# Patient Record
Sex: MALE | Race: WHITE | Employment: FULL TIME | URBAN - METROPOLITAN AREA
[De-identification: names, ages, dates, MRNs, and addresses within clinical notes are randomized per-mention and may not be internally consistent; named-entity substitution may affect disease eponyms.]

---

## 2021-01-18 ENCOUNTER — HOSPITAL ENCOUNTER (EMERGENCY)
Age: 46
Discharge: HOME | End: 2021-01-18
Attending: EMERGENCY MEDICINE
Payer: COMMERCIAL

## 2021-01-18 VITALS
SYSTOLIC BLOOD PRESSURE: 170 MMHG | HEIGHT: 67 IN | HEART RATE: 62 BPM | RESPIRATION RATE: 18 BRPM | WEIGHT: 203 LBS | TEMPERATURE: 97 F | DIASTOLIC BLOOD PRESSURE: 11 MMHG | BODY MASS INDEX: 31.86 KG/M2 | OXYGEN SATURATION: 98 %

## 2021-01-18 DIAGNOSIS — M54.31 SCIATICA OF RIGHT SIDE: Primary | ICD-10-CM

## 2021-01-18 PROCEDURE — 96374 THER/PROPH/DIAG INJ IV PUSH: CPT

## 2021-01-18 PROCEDURE — 99284 EMERGENCY DEPT VISIT MOD MDM: CPT

## 2021-01-18 PROCEDURE — 6360000002 HC RX W HCPCS: Performed by: EMERGENCY MEDICINE

## 2021-01-18 RX ORDER — CYCLOBENZAPRINE HCL 10 MG
10 TABLET ORAL 3 TIMES DAILY PRN
Qty: 10 TABLET | Refills: 0 | Status: SHIPPED | OUTPATIENT
Start: 2021-01-18 | End: 2021-01-28

## 2021-01-18 RX ORDER — OXYCODONE HYDROCHLORIDE AND ACETAMINOPHEN 5; 325 MG/1; MG/1
1 TABLET ORAL EVERY 8 HOURS PRN
Qty: 9 TABLET | Refills: 0 | Status: SHIPPED | OUTPATIENT
Start: 2021-01-18 | End: 2021-01-21

## 2021-01-18 RX ORDER — METHYLPREDNISOLONE 4 MG/1
TABLET ORAL
Qty: 1 KIT | Refills: 0 | Status: SHIPPED | OUTPATIENT
Start: 2021-01-18 | End: 2021-01-24

## 2021-01-18 RX ORDER — KETOROLAC TROMETHAMINE 30 MG/ML
30 INJECTION, SOLUTION INTRAMUSCULAR; INTRAVENOUS ONCE
Status: COMPLETED | OUTPATIENT
Start: 2021-01-18 | End: 2021-01-18

## 2021-01-18 RX ADMIN — KETOROLAC TROMETHAMINE 30 MG: 30 INJECTION, SOLUTION INTRAMUSCULAR at 08:18

## 2021-01-18 ASSESSMENT — PAIN SCALES - GENERAL: PAINLEVEL_OUTOF10: 7

## 2021-01-18 ASSESSMENT — PAIN DESCRIPTION - LOCATION: LOCATION: BACK

## 2021-01-18 ASSESSMENT — PAIN DESCRIPTION - PAIN TYPE: TYPE: ACUTE PAIN

## 2021-01-18 NOTE — ED PROVIDER NOTES
HPI:  1/18/21, Time: 8:05 AM ETHAN Robles is a 50 y.o. male presenting to the ED for back pain beginning 4 days ago. He describes it as a sharp pain in his right lower back which radiates down his right leg. Symptoms are worse when laying flat for a period of time and certain movements. He has been taking Aleve with minimal relief of his symptoms. Symptoms are improved when he gets up and walks around. He states he has had similar episodes in the past that normally resolve much quicker. He denies any recent falls or trauma or heavy lifting. No bowel or bladder incontinence or retention, fevers, saddle anesthesia, or extremity numbness or weakness. No anticoagulation. He denies chest pain, shortness of breath, cough, abdominal pain, nausea, vomiting, and diarrhea. Review of Systems:   Pertinent positives and negatives are stated within HPI, all other systems reviewed and are negative.          --------------------------------------------- PAST HISTORY ---------------------------------------------  Past Medical History:  has a past medical history of Depression and Hypertension. Past Surgical History:  has a past surgical history that includes Tonsillectomy. Social History:  reports that he has never smoked. He has never used smokeless tobacco. He reports current alcohol use of about 3.0 standard drinks of alcohol per week. He reports that he does not use drugs. Family History: family history is not on file. The patients home medications have been reviewed. Allergies: Patient has no known allergies. -------------------------------------------------- RESULTS -------------------------------------------------  All laboratory and radiology results have been personally reviewed by myself   LABS:  No results found for this visit on 01/18/21.     RADIOLOGY:  Interpreted by Radiologist.  No orders to display       ------------------------- NURSING NOTES AND VITALS REVIEWED ---------------------------   The nursing notes within the ED encounter and vital signs as below have been reviewed. BP (!) 170/11   Pulse 62   Temp 97 °F (36.1 °C)   Resp 18   Ht 5' 7\" (1.702 m)   Wt 203 lb (92.1 kg)   SpO2 98%   BMI 31.79 kg/m²   Oxygen Saturation Interpretation: Normal      ---------------------------------------------------PHYSICAL EXAM--------------------------------------      Constitutional/General: Alert and oriented x3, appears uncomfortable, non toxic in NAD  Head: Normocephalic and atraumatic  Eyes: PERRL, EOMI  Mouth: Oropharynx clear, handling secretions, no trismus  Neck: Supple, full ROM,   Pulmonary: Lungs clear to auscultation bilaterally, no wheezes, rales, or rhonchi. Not in respiratory distress  Cardiovascular:  Regular rate and rhythm, no murmurs, gallops, or rubs. 2+ distal pulses  Abdomen: Soft, non tender, non distended,   Back: No midline cervical, thoracic, or lumbar tenderness, tenderness to right lumbar paraspinal muscles and right sciatic notch. 5/5 strength bilateral upper and lower extremities. Normal sensation in all extremities. Extremities: Moves all extremities x 4. Warm and well perfused  Skin: warm and dry without rash  Neurologic: GCS 15, no focal motor or sensory deficits   Psych: Normal Affect      ------------------------------ ED COURSE/MEDICAL DECISION MAKING----------------------  Medications   ketorolac (TORADOL) injection 30 mg (30 mg Intravenous Given 1/18/21 0818)       Medical Decision Making/ED COURSE:   Patient is a 40-year-old male presenting with right-sided sciatica. He has no signs or symptoms of spinal cord compression. He has had no history of recent trauma or heavy lifting. Offered lumbar x-ray though I have low suspicion for bony abnormality. Patient would like to defer imaging at this time.   I advised the patient to follow-up with his doctor if continued or worsening symptoms, because he may need further evaluation with outpatient MRI. He demonstrates understanding. Plan to treat symptomatically with NSAIDs, muscle relaxants, steroids, and pain medication. Patient remained hemodynamically stable throughout ED course. Counseling: The emergency provider has spoken with the patient and discussed todays results, in addition to providing specific details for the plan of care and counseling regarding the diagnosis and prognosis. Questions are answered at this time and they are agreeable with the plan.      --------------------------------- IMPRESSION AND DISPOSITION ---------------------------------    IMPRESSION  1. Sciatica of right side        DISPOSITION  Disposition: Discharge to home  Patient condition is stable      NOTE: This report was transcribed using voice recognition software.  Every effort was made to ensure accuracy; however, inadvertent computerized transcription errors may be present    IPiyush MD, am the primary provider of this record       Piyush Dhillon MD  01/18/21 [id number]

## 2022-06-05 ENCOUNTER — HOSPITAL ENCOUNTER (INPATIENT)
Age: 47
LOS: 15 days | Discharge: HOME HEALTH | DRG: 329 | End: 2022-06-20
Attending: EMERGENCY MEDICINE | Admitting: SURGERY
Payer: COMMERCIAL

## 2022-06-05 ENCOUNTER — APPOINTMENT (OUTPATIENT)
Dept: CT IMAGING | Age: 47
DRG: 329 | End: 2022-06-05
Payer: COMMERCIAL

## 2022-06-05 ENCOUNTER — APPOINTMENT (OUTPATIENT)
Dept: GENERAL RADIOLOGY | Age: 47
DRG: 329 | End: 2022-06-05
Payer: COMMERCIAL

## 2022-06-05 DIAGNOSIS — K42.9 UMBILICAL HERNIA WITHOUT OBSTRUCTION AND WITHOUT GANGRENE: ICD-10-CM

## 2022-06-05 DIAGNOSIS — K76.0 FATTY LIVER: ICD-10-CM

## 2022-06-05 DIAGNOSIS — N28.1 KIDNEY CYSTS: ICD-10-CM

## 2022-06-05 DIAGNOSIS — K57.32 DIVERTICULITIS OF COLON: Primary | ICD-10-CM

## 2022-06-05 DIAGNOSIS — K57.92 DIVERTICULITIS: ICD-10-CM

## 2022-06-05 LAB
ALBUMIN SERPL-MCNC: 3.7 G/DL (ref 3.5–5.2)
ALP BLD-CCNC: 125 U/L (ref 40–129)
ALT SERPL-CCNC: 49 U/L (ref 0–40)
ANION GAP SERPL CALCULATED.3IONS-SCNC: 15 MMOL/L (ref 7–16)
AST SERPL-CCNC: 35 U/L (ref 0–39)
BACTERIA: NORMAL /HPF
BASOPHILS ABSOLUTE: 0.07 E9/L (ref 0–0.2)
BASOPHILS RELATIVE PERCENT: 0.7 % (ref 0–2)
BILIRUB SERPL-MCNC: 0.7 MG/DL (ref 0–1.2)
BILIRUBIN URINE: NEGATIVE
BLOOD, URINE: NEGATIVE
BUN BLDV-MCNC: 16 MG/DL (ref 6–20)
CALCIUM SERPL-MCNC: 9.3 MG/DL (ref 8.6–10.2)
CHLORIDE BLD-SCNC: 98 MMOL/L (ref 98–107)
CLARITY: CLEAR
CO2: 21 MMOL/L (ref 22–29)
COLOR: YELLOW
CREAT SERPL-MCNC: 1.1 MG/DL (ref 0.7–1.2)
EKG ATRIAL RATE: 70 BPM
EKG P AXIS: 32 DEGREES
EKG P-R INTERVAL: 160 MS
EKG Q-T INTERVAL: 404 MS
EKG QRS DURATION: 86 MS
EKG QTC CALCULATION (BAZETT): 436 MS
EKG R AXIS: -2 DEGREES
EKG T AXIS: 29 DEGREES
EKG VENTRICULAR RATE: 70 BPM
EOSINOPHILS ABSOLUTE: 0.21 E9/L (ref 0.05–0.5)
EOSINOPHILS RELATIVE PERCENT: 2.2 % (ref 0–6)
GFR AFRICAN AMERICAN: >60
GFR NON-AFRICAN AMERICAN: >60 ML/MIN/1.73
GLUCOSE BLD-MCNC: 117 MG/DL (ref 74–99)
GLUCOSE URINE: NEGATIVE MG/DL
HCT VFR BLD CALC: 44.1 % (ref 37–54)
HEMOGLOBIN: 14.2 G/DL (ref 12.5–16.5)
IMMATURE GRANULOCYTES #: 0.1 E9/L
IMMATURE GRANULOCYTES %: 1 % (ref 0–5)
KETONES, URINE: NEGATIVE MG/DL
LACTIC ACID: 0.9 MMOL/L (ref 0.5–2.2)
LEUKOCYTE ESTERASE, URINE: NEGATIVE
LIPASE: 18 U/L (ref 13–60)
LYMPHOCYTES ABSOLUTE: 1.18 E9/L (ref 1.5–4)
LYMPHOCYTES RELATIVE PERCENT: 12.2 % (ref 20–42)
MCH RBC QN AUTO: 27.8 PG (ref 26–35)
MCHC RBC AUTO-ENTMCNC: 32.2 % (ref 32–34.5)
MCV RBC AUTO: 86.3 FL (ref 80–99.9)
MONOCYTES ABSOLUTE: 0.41 E9/L (ref 0.1–0.95)
MONOCYTES RELATIVE PERCENT: 4.3 % (ref 2–12)
NEUTROPHILS ABSOLUTE: 7.67 E9/L (ref 1.8–7.3)
NEUTROPHILS RELATIVE PERCENT: 79.6 % (ref 43–80)
NITRITE, URINE: NEGATIVE
PDW BLD-RTO: 14 FL (ref 11.5–15)
PH UA: 6.5 (ref 5–9)
PLATELET # BLD: 314 E9/L (ref 130–450)
PMV BLD AUTO: 9.8 FL (ref 7–12)
POTASSIUM REFLEX MAGNESIUM: 3.7 MMOL/L (ref 3.5–5)
PROTEIN UA: NEGATIVE MG/DL
RBC # BLD: 5.11 E12/L (ref 3.8–5.8)
RBC UA: NORMAL /HPF (ref 0–2)
SODIUM BLD-SCNC: 134 MMOL/L (ref 132–146)
SPECIFIC GRAVITY UA: <=1.005 (ref 1–1.03)
TOTAL PROTEIN: 7.2 G/DL (ref 6.4–8.3)
TROPONIN, HIGH SENSITIVITY: 12 NG/L (ref 0–11)
TROPONIN, HIGH SENSITIVITY: 15 NG/L (ref 0–11)
UROBILINOGEN, URINE: 1 E.U./DL
WBC # BLD: 9.6 E9/L (ref 4.5–11.5)
WBC UA: NORMAL /HPF (ref 0–5)

## 2022-06-05 PROCEDURE — 96375 TX/PRO/DX INJ NEW DRUG ADDON: CPT

## 2022-06-05 PROCEDURE — 74177 CT ABD & PELVIS W/CONTRAST: CPT

## 2022-06-05 PROCEDURE — 2500000003 HC RX 250 WO HCPCS: Performed by: NURSE PRACTITIONER

## 2022-06-05 PROCEDURE — 6360000002 HC RX W HCPCS: Performed by: NURSE PRACTITIONER

## 2022-06-05 PROCEDURE — 83690 ASSAY OF LIPASE: CPT

## 2022-06-05 PROCEDURE — 80053 COMPREHEN METABOLIC PANEL: CPT

## 2022-06-05 PROCEDURE — 71046 X-RAY EXAM CHEST 2 VIEWS: CPT

## 2022-06-05 PROCEDURE — 85025 COMPLETE CBC W/AUTO DIFF WBC: CPT

## 2022-06-05 PROCEDURE — 2580000003 HC RX 258: Performed by: SURGERY

## 2022-06-05 PROCEDURE — 6360000002 HC RX W HCPCS: Performed by: SURGERY

## 2022-06-05 PROCEDURE — 93005 ELECTROCARDIOGRAM TRACING: CPT | Performed by: NURSE PRACTITIONER

## 2022-06-05 PROCEDURE — 6360000004 HC RX CONTRAST MEDICATION: Performed by: RADIOLOGY

## 2022-06-05 PROCEDURE — 99285 EMERGENCY DEPT VISIT HI MDM: CPT

## 2022-06-05 PROCEDURE — 2500000003 HC RX 250 WO HCPCS: Performed by: SURGERY

## 2022-06-05 PROCEDURE — 1200000000 HC SEMI PRIVATE

## 2022-06-05 PROCEDURE — 83605 ASSAY OF LACTIC ACID: CPT

## 2022-06-05 PROCEDURE — 81001 URINALYSIS AUTO W/SCOPE: CPT

## 2022-06-05 PROCEDURE — 2580000003 HC RX 258: Performed by: NURSE PRACTITIONER

## 2022-06-05 PROCEDURE — 96365 THER/PROPH/DIAG IV INF INIT: CPT

## 2022-06-05 PROCEDURE — 36415 COLL VENOUS BLD VENIPUNCTURE: CPT

## 2022-06-05 PROCEDURE — 84484 ASSAY OF TROPONIN QUANT: CPT

## 2022-06-05 RX ORDER — ONDANSETRON 2 MG/ML
4 INJECTION INTRAMUSCULAR; INTRAVENOUS ONCE
Status: COMPLETED | OUTPATIENT
Start: 2022-06-05 | End: 2022-06-05

## 2022-06-05 RX ORDER — KETOROLAC TROMETHAMINE 30 MG/ML
30 INJECTION, SOLUTION INTRAMUSCULAR; INTRAVENOUS ONCE
Status: COMPLETED | OUTPATIENT
Start: 2022-06-05 | End: 2022-06-05

## 2022-06-05 RX ORDER — 0.9 % SODIUM CHLORIDE 0.9 %
1000 INTRAVENOUS SOLUTION INTRAVENOUS ONCE
Status: COMPLETED | OUTPATIENT
Start: 2022-06-05 | End: 2022-06-05

## 2022-06-05 RX ORDER — METRONIDAZOLE 500 MG/100ML
500 INJECTION, SOLUTION INTRAVENOUS ONCE
Status: COMPLETED | OUTPATIENT
Start: 2022-06-05 | End: 2022-06-05

## 2022-06-05 RX ORDER — CEFTRIAXONE 2 G/1
2000 INJECTION, POWDER, FOR SOLUTION INTRAMUSCULAR; INTRAVENOUS ONCE
Status: DISCONTINUED | OUTPATIENT
Start: 2022-06-05 | End: 2022-06-05

## 2022-06-05 RX ORDER — MORPHINE SULFATE 4 MG/ML
4 INJECTION, SOLUTION INTRAMUSCULAR; INTRAVENOUS ONCE
Status: COMPLETED | OUTPATIENT
Start: 2022-06-05 | End: 2022-06-05

## 2022-06-05 RX ORDER — SODIUM CHLORIDE 9 MG/ML
INJECTION, SOLUTION INTRAVENOUS CONTINUOUS
Status: DISCONTINUED | OUTPATIENT
Start: 2022-06-05 | End: 2022-06-07

## 2022-06-05 RX ORDER — METRONIDAZOLE 500 MG/100ML
500 INJECTION, SOLUTION INTRAVENOUS EVERY 8 HOURS
Status: DISCONTINUED | OUTPATIENT
Start: 2022-06-05 | End: 2022-06-07

## 2022-06-05 RX ADMIN — IOPAMIDOL 75 ML: 755 INJECTION, SOLUTION INTRAVENOUS at 14:18

## 2022-06-05 RX ADMIN — KETOROLAC TROMETHAMINE 30 MG: 30 INJECTION, SOLUTION INTRAMUSCULAR at 12:43

## 2022-06-05 RX ADMIN — HYDROMORPHONE HYDROCHLORIDE 0.25 MG: 1 INJECTION, SOLUTION INTRAMUSCULAR; INTRAVENOUS; SUBCUTANEOUS at 20:33

## 2022-06-05 RX ADMIN — METRONIDAZOLE 500 MG: 500 INJECTION, SOLUTION INTRAVENOUS at 15:17

## 2022-06-05 RX ADMIN — SODIUM CHLORIDE 1000 ML: 9 INJECTION, SOLUTION INTRAVENOUS at 12:37

## 2022-06-05 RX ADMIN — SODIUM CHLORIDE: 9 INJECTION, SOLUTION INTRAVENOUS at 16:18

## 2022-06-05 RX ADMIN — SODIUM CHLORIDE: 9 INJECTION, SOLUTION INTRAVENOUS at 23:36

## 2022-06-05 RX ADMIN — MORPHINE SULFATE 4 MG: 4 INJECTION, SOLUTION INTRAMUSCULAR; INTRAVENOUS at 13:54

## 2022-06-05 RX ADMIN — ONDANSETRON 4 MG: 2 INJECTION INTRAMUSCULAR; INTRAVENOUS at 12:43

## 2022-06-05 RX ADMIN — CEFTRIAXONE 2000 MG: 2 INJECTION, POWDER, FOR SOLUTION INTRAMUSCULAR; INTRAVENOUS at 17:33

## 2022-06-05 RX ADMIN — METRONIDAZOLE 500 MG: 500 INJECTION, SOLUTION INTRAVENOUS at 23:35

## 2022-06-05 ASSESSMENT — PAIN DESCRIPTION - LOCATION
LOCATION: ABDOMEN

## 2022-06-05 ASSESSMENT — PAIN DESCRIPTION - FREQUENCY
FREQUENCY: CONTINUOUS
FREQUENCY: CONTINUOUS

## 2022-06-05 ASSESSMENT — PAIN SCALES - GENERAL
PAINLEVEL_OUTOF10: 10
PAINLEVEL_OUTOF10: 6
PAINLEVEL_OUTOF10: 8
PAINLEVEL_OUTOF10: 8
PAINLEVEL_OUTOF10: 10

## 2022-06-05 ASSESSMENT — PAIN DESCRIPTION - ONSET
ONSET: ON-GOING
ONSET: ON-GOING

## 2022-06-05 ASSESSMENT — PAIN DESCRIPTION - PAIN TYPE: TYPE: ACUTE PAIN

## 2022-06-05 ASSESSMENT — PAIN DESCRIPTION - DESCRIPTORS
DESCRIPTORS: SHARP;DISCOMFORT
DESCRIPTORS: ACHING;CRUSHING;DISCOMFORT;GNAWING

## 2022-06-05 ASSESSMENT — PAIN DESCRIPTION - ORIENTATION: ORIENTATION: MID

## 2022-06-05 ASSESSMENT — PAIN - FUNCTIONAL ASSESSMENT
PAIN_FUNCTIONAL_ASSESSMENT: 0-10
PAIN_FUNCTIONAL_ASSESSMENT: ACTIVITIES ARE NOT PREVENTED

## 2022-06-05 NOTE — ED NOTES
[facility] faxed, floor called to notify, spoke with Krystyna Peck, placed in transport.       Isabel Yarbrough RN  06/05/22 [id number]

## 2022-06-05 NOTE — ED PROVIDER NOTES
ED Attending shared visit  CC: Mandi         [facility]  Department of Emergency Medicine   ED  Encounter Note  Admit Date/RoomTime: 2022 11:55 AM  ED Room: 0508/0508-A    NAME: Elizabeth Alcantara  : 1975  MRN: [id number]     Chief Complaint:  Abdominal Pain (constipation x 1 week, bloating.  )    History of Present Illness        Elizabeth Alcantara is a 48 y.o. old male who presents to the emergency department by private vehicle, for gradual onset sharp pain in the entire abdomen with radiation into lower chest which began 1 week(s) prior to arrival.  There has been no similar episodes in the past.   Since onset the symptoms have been recurrent and worsening. The pain is associated with chills, nausea and constipation. The pain is aggravated by nothing in particular and relieved by nothing. There has been NO back pain, chest pain, shortness of breath, fever, vomiting, anorexia, weight loss, dark/black stools, blood in stool, blood in emesis, cloudy urine, urinary frequency, dysuria, hematuria, urinary urgency, urinary incontinence, penile discharge or scrotal pain. Patient states that he has had constipation for the last week. He states he is also having bloating. He states that he had last full bowel movement was 1 week ago. It is small amounts of watery diarrhea. .  ROS   Pertinent positives and negatives are stated within HPI, all other systems reviewed and are negative. Past Medical History:  has a past medical history of Depression and Hypertension. Surgical History:  has a past surgical history that includes Tonsillectomy. Social History:  reports that he has never smoked. He has never used smokeless tobacco. He reports current alcohol use of about 3.0 standard drinks of alcohol per week. He reports that he does not use drugs. Family History: family history is not on file. Allergies: Patient has no known allergies.     Physical Exam   Oxygen Saturation Interpretation: Normal.        ED Triage Vitals [06/05/22 1153]   BP Temp Temp Source Pulse Resp SpO2 Height Weight   (!) 208/116 97.1 °F (36.2 °C) Tympanic -- 20 100 % 5' 7\" (1.702 m) 236 lb (107 kg)       Physical Exam  General Appearance/Constitutional:  Alert, development consistent with age. HEENT:  NC/NT. PERRLA. Airway patent. Neck:  Supple. No lymphadenopathy. Respiratory: Lungs Clear to auscultation and breath sounds equal.  CV:  Regular rate and rhythm. GI:  normal appearing,with no visible hernias. Disteneded         Bowel sounds: normal bowel sounds. Tenderness: There is moderate tenderness present - located diffusely in the entire abdomen., There is no rebound tenderness. , There is no guarding. , There is no distension. , There is no pulsatile mass. .           Liver: non-tender. Spleen:  non-tender. Back: CVA Tenderness: No CVA tenderness. Integument:  Normal turgor. Warm, dry, without visible rash, unless noted elsewhere. Neurological:  Orientation age-appropriate. Motor functions intact.     Lab / Imaging Results   (All laboratory and radiology results have been personally reviewed by myself)  Labs:  Results for orders placed or performed during the hospital encounter of 06/05/22   CBC with Auto Differential   Result Value Ref Range    WBC 9.6 4.5 - 11.5 E9/L    RBC 5.11 3.80 - 5.80 E12/L    Hemoglobin 14.2 12.5 - 16.5 g/dL    Hematocrit 44.1 37.0 - 54.0 %    MCV 86.3 80.0 - 99.9 fL    MCH 27.8 26.0 - 35.0 pg    MCHC 32.2 32.0 - 34.5 %    RDW 14.0 11.5 - 15.0 fL    Platelets 391 258 - 628 E9/L    MPV 9.8 7.0 - 12.0 fL    Neutrophils % 79.6 43.0 - 80.0 %    Immature Granulocytes % 1.0 0.0 - 5.0 %    Lymphocytes % 12.2 (L) 20.0 - 42.0 %    Monocytes % 4.3 2.0 - 12.0 %    Eosinophils % 2.2 0.0 - 6.0 %    Basophils % 0.7 0.0 - 2.0 %    Neutrophils Absolute 7.67 (H) 1.80 - 7.30 E9/L    Immature Granulocytes # 0.10 E9/L    Lymphocytes Absolute 1.18 (L) 1.50 - 4.00 E9/L Monocytes Absolute 0.41 0.10 - 0.95 E9/L    Eosinophils Absolute 0.21 0.05 - 0.50 E9/L    Basophils Absolute 0.07 0.00 - 0.20 E9/L   Comprehensive Metabolic Panel w/ Reflex to MG   Result Value Ref Range    Sodium 134 132 - 146 mmol/L    Potassium reflex Magnesium 3.7 3.5 - 5.0 mmol/L    Chloride 98 98 - 107 mmol/L    CO2 21 (L) 22 - 29 mmol/L    Anion Gap 15 7 - 16 mmol/L    Glucose 117 (H) 74 - 99 mg/dL    BUN 16 6 - 20 mg/dL    CREATININE 1.1 0.7 - 1.2 mg/dL    GFR Non-African American >60 >=60 mL/min/1.73    GFR African American >60     Calcium 9.3 8.6 - 10.2 mg/dL    Total Protein 7.2 6.4 - 8.3 g/dL    Albumin 3.7 3.5 - 5.2 g/dL    Total Bilirubin 0.7 0.0 - 1.2 mg/dL    Alkaline Phosphatase 125 40 - 129 U/L    ALT 49 (H) 0 - 40 U/L    AST 35 0 - 39 U/L   Lipase   Result Value Ref Range    Lipase 18 13 - 60 U/L   Lactic Acid   Result Value Ref Range    Lactic Acid 0.9 0.5 - 2.2 mmol/L   Urinalysis with Microscopic   Result Value Ref Range    Color, UA Yellow Straw/Yellow    Clarity, UA Clear Clear    Glucose, Ur Negative Negative mg/dL    Bilirubin Urine Negative Negative    Ketones, Urine Negative Negative mg/dL    Specific Gravity, UA <=1.005 1.005 - 1.030    Blood, Urine Negative Negative    pH, UA 6.5 5.0 - 9.0    Protein, UA Negative Negative mg/dL    Urobilinogen, Urine 1.0 <2.0 E.U./dL    Nitrite, Urine Negative Negative    Leukocyte Esterase, Urine Negative Negative    WBC, UA NONE 0 - 5 /HPF    RBC, UA NONE 0 - 2 /HPF    Bacteria, UA NONE SEEN None Seen /HPF   Troponin   Result Value Ref Range    Troponin, High Sensitivity 12 (H) 0 - 11 ng/L   Troponin   Result Value Ref Range    Troponin, High Sensitivity 15 (H) 0 - 11 ng/L   EKG 12 Lead   Result Value Ref Range    Ventricular Rate 70 BPM    Atrial Rate 70 BPM    P-R Interval 160 ms    QRS Duration 86 ms    Q-T Interval 404 ms    QTc Calculation (Bazett) 436 ms    P Axis 32 degrees    R Axis -2 degrees    T Axis 29 degrees     Imaging:   All Radiology results interpreted by Radiologist unless otherwise noted. CT ABDOMEN PELVIS W IV CONTRAST Additional Contrast? None   Final Result   There is abnormal wall thickening identified of the sigmoid colon with   surrounding stranding and fluid to suggest acute diverticulitis or focal   colitis. No evidence of perforation or abscess at this time. Fluid is seen   surrounding both the liver and spleen as well as throughout the small bowel   mesentery. .         XR CHEST (2 VW)   Final Result   No acute process. ED Course / Medical Decision Making     Medications   cefTRIAXone (ROCEPHIN) 2,000 mg in sterile water 20 mL IV syringe (has no administration in time range)   0.9 % sodium chloride infusion ( IntraVENous New Bag 6/5/22 1618)   cefTRIAXone (ROCEPHIN) 1,000 mg in sodium chloride flush 10 mL IV syringe (has no administration in time range)   metronidazole (FLAGYL) 500 mg in 0.9% NaCl 100 mL IVPB premix (has no administration in time range)   HYDROmorphone (DILAUDID) injection 0.25 mg (has no administration in time range)   0.9 % sodium chloride bolus (0 mLs IntraVENous Stopped 6/5/22 1337)   ketorolac (TORADOL) injection 30 mg (30 mg IntraVENous Given 6/5/22 1243)   ondansetron (ZOFRAN) injection 4 mg (4 mg IntraVENous Given 6/5/22 1243)   morphine sulfate (PF) injection 4 mg (4 mg IntraVENous Given 6/5/22 1354)   iopamidol (ISOVUE-370) 76 % injection 75 mL (75 mLs IntraVENous Given 6/5/22 1418)   metronidazole (FLAGYL) 500 mg in 0.9% NaCl 100 mL IVPB premix (0 mg IntraVENous Stopped 6/5/22 1617)       EKG: This EKG is signed and interpreted by ER attending. Rate: 70  Rhythm: Sinus  Interpretation: Normal sinus rhythm. No STEMI    Re-Evaluations:  6/5/22      Time:1500-reviewed all results with patient. Patient states he does not feel he can go home with this pain. He would like to be admitted. He states he does not have a surgeon would like to take the on-call surgeon.     Patients condition remains unchanged and remains stable. Consultations:  200- Spoke with the radiologist, Dr. Deonna Laughlin, to clarify fluid around the liver and spleen. She states this is all from the colon. There is no ascites or free blood. 1533-spoke with Dr. Susan Noguera, on-call general surgeon. He agrees and accepts admission to [facility] 5 full admit. He would like patient n.p.o. and normal saline fluids at 125 mL/hr. Procedures:   none    MDM:  Patient presents to the ED for generalized abdominal pain with nausea and constipation. Differential diagnoses included but not limited to diverticulitis versus ascites. Workup in the ED revealed EKG was normal sinus rhythm high-sensitivity Ulta troponin was 12 and 15. CBC was unremarkable the normal white count. CMP was unremarkable. Lipase was normal at 18. Lactic acid was normal.  Urinalysis was unremarkable. .  CT of the abdomen pelvis with contrast revealed abnormal wall thickening of the sigmoid colon suggestive of acute diverticulitis with no evidence of perforation or abscess. There was fluid seen in the surrounding liver and spleen which was discussed with radiologist that felt this was just inflammation from the diverticulitis. Chest x-ray was unremarkable. Spoke with on-call surgeon that agrees and accepts admission for IV antibiotics and pain management. Patient requires continued workup and management of their symptoms and will be admitted to the hospital for further evaluation and treatment. Plan of Care/Counseling:  MARBIN Robb CNP and EM Attending Physician  reviewed today's visit with the patient in addition to providing specific details for the plan of care and counseling regarding the diagnosis and prognosis. Questions are answered at this time and are agreeable with the plan. Assessment      1. Diverticulitis of colon    2. Fatty liver    3. Kidney cysts    4.  Umbilical hernia without obstruction and without gangrene      This patient's ED course included: a personal history and physicial examination, re-evaluation prior to disposition, multiple bedside re-evaluations and IV medications  This patient has remained hemodynamically stable during their ED course. Plan   Inpatient Admission to General Medical Floor  Patient condition is stable. New Medications     Current Discharge Medication List        Electronically signed by MARBIN Winchester CNP   DD: 6/5/22  **This report was transcribed using voice recognition software. Every effort was made to ensure accuracy; however, inadvertent computerized transcription errors may be present.   END OF PROVIDER NOTE       MARBIN Winchester CNP  06/05/22 5536

## 2022-06-05 NOTE — PLAN OF CARE
Problem: Pain  Goal: Verbalizes/displays adequate comfort level or baseline comfort level  Outcome: Progressing  Flowsheets (Taken 6/5/2022 1600)  Verbalizes/displays adequate comfort level or baseline comfort level:   Assess pain using appropriate pain scale   Administer analgesics based on type and severity of pain and evaluate response     Problem: Safety - Adult  Goal: Free from fall injury  Outcome: Progressing     Problem: Anxiety  Goal: Will report anxiety at manageable levels  Description: INTERVENTIONS:  1. Administer medication as ordered  2. Teach and rehearse alternative coping skills  3.  Provide emotional support with 1:1 interaction with staff  Outcome: Progressing

## 2022-06-06 LAB
ANION GAP SERPL CALCULATED.3IONS-SCNC: 13 MMOL/L (ref 7–16)
BUN BLDV-MCNC: 16 MG/DL (ref 6–20)
CALCIUM SERPL-MCNC: 8.6 MG/DL (ref 8.6–10.2)
CHLORIDE BLD-SCNC: 103 MMOL/L (ref 98–107)
CO2: 23 MMOL/L (ref 22–29)
CREAT SERPL-MCNC: 1.1 MG/DL (ref 0.7–1.2)
GFR AFRICAN AMERICAN: >60
GFR NON-AFRICAN AMERICAN: >60 ML/MIN/1.73
GLUCOSE BLD-MCNC: 117 MG/DL (ref 74–99)
HCT VFR BLD CALC: 41.8 % (ref 37–54)
HEMOGLOBIN: 13.3 G/DL (ref 12.5–16.5)
MCH RBC QN AUTO: 27.8 PG (ref 26–35)
MCHC RBC AUTO-ENTMCNC: 31.8 % (ref 32–34.5)
MCV RBC AUTO: 87.4 FL (ref 80–99.9)
PDW BLD-RTO: 14.3 FL (ref 11.5–15)
PLATELET # BLD: 341 E9/L (ref 130–450)
PMV BLD AUTO: 10.2 FL (ref 7–12)
POTASSIUM SERPL-SCNC: 5.2 MMOL/L (ref 3.5–5)
RBC # BLD: 4.78 E12/L (ref 3.8–5.8)
SODIUM BLD-SCNC: 139 MMOL/L (ref 132–146)
WBC # BLD: 16.8 E9/L (ref 4.5–11.5)

## 2022-06-06 PROCEDURE — 6360000002 HC RX W HCPCS: Performed by: SURGERY

## 2022-06-06 PROCEDURE — 2500000003 HC RX 250 WO HCPCS: Performed by: SURGERY

## 2022-06-06 PROCEDURE — 36415 COLL VENOUS BLD VENIPUNCTURE: CPT

## 2022-06-06 PROCEDURE — 2580000003 HC RX 258: Performed by: SURGERY

## 2022-06-06 PROCEDURE — 85027 COMPLETE CBC AUTOMATED: CPT

## 2022-06-06 PROCEDURE — 80048 BASIC METABOLIC PNL TOTAL CA: CPT

## 2022-06-06 PROCEDURE — 1200000000 HC SEMI PRIVATE

## 2022-06-06 RX ADMIN — METRONIDAZOLE 500 MG: 500 INJECTION, SOLUTION INTRAVENOUS at 15:47

## 2022-06-06 RX ADMIN — METRONIDAZOLE 500 MG: 500 INJECTION, SOLUTION INTRAVENOUS at 23:35

## 2022-06-06 RX ADMIN — SODIUM CHLORIDE: 9 INJECTION, SOLUTION INTRAVENOUS at 20:29

## 2022-06-06 RX ADMIN — SODIUM CHLORIDE, PRESERVATIVE FREE 1000 MG: 5 INJECTION INTRAVENOUS at 17:06

## 2022-06-06 RX ADMIN — HYDROMORPHONE HYDROCHLORIDE 0.25 MG: 1 INJECTION, SOLUTION INTRAMUSCULAR; INTRAVENOUS; SUBCUTANEOUS at 04:21

## 2022-06-06 RX ADMIN — HYDROMORPHONE HYDROCHLORIDE 0.25 MG: 1 INJECTION, SOLUTION INTRAMUSCULAR; INTRAVENOUS; SUBCUTANEOUS at 00:20

## 2022-06-06 RX ADMIN — HYDROMORPHONE HYDROCHLORIDE 0.25 MG: 1 INJECTION, SOLUTION INTRAMUSCULAR; INTRAVENOUS; SUBCUTANEOUS at 23:37

## 2022-06-06 RX ADMIN — SODIUM CHLORIDE: 9 INJECTION, SOLUTION INTRAVENOUS at 10:15

## 2022-06-06 RX ADMIN — HYDROMORPHONE HYDROCHLORIDE 0.25 MG: 1 INJECTION, SOLUTION INTRAMUSCULAR; INTRAVENOUS; SUBCUTANEOUS at 17:06

## 2022-06-06 RX ADMIN — HYDROMORPHONE HYDROCHLORIDE 0.25 MG: 1 INJECTION, SOLUTION INTRAMUSCULAR; INTRAVENOUS; SUBCUTANEOUS at 10:11

## 2022-06-06 RX ADMIN — METRONIDAZOLE 500 MG: 500 INJECTION, SOLUTION INTRAVENOUS at 06:55

## 2022-06-06 ASSESSMENT — PAIN DESCRIPTION - DESCRIPTORS: DESCRIPTORS: THROBBING;OTHER (COMMENT)

## 2022-06-06 ASSESSMENT — PAIN SCALES - GENERAL
PAINLEVEL_OUTOF10: 7
PAINLEVEL_OUTOF10: 4
PAINLEVEL_OUTOF10: 10
PAINLEVEL_OUTOF10: 2
PAINLEVEL_OUTOF10: 8
PAINLEVEL_OUTOF10: 8

## 2022-06-06 ASSESSMENT — ENCOUNTER SYMPTOMS
ALLERGIC/IMMUNOLOGIC NEGATIVE: 1
EYES NEGATIVE: 1
CHEST TIGHTNESS: 0
VOMITING: 0
SHORTNESS OF BREATH: 0
ABDOMINAL DISTENTION: 0
NAUSEA: 1
WHEEZING: 0
CONSTIPATION: 1
ABDOMINAL PAIN: 1

## 2022-06-06 ASSESSMENT — PAIN DESCRIPTION - LOCATION: LOCATION: ABDOMEN

## 2022-06-06 ASSESSMENT — PAIN DESCRIPTION - ORIENTATION
ORIENTATION: LOWER;LEFT;MID
ORIENTATION: RIGHT;LOWER

## 2022-06-06 NOTE — PLAN OF CARE
Problem: Pain  Goal: Verbalizes/displays adequate comfort level or baseline comfort level  6/5/2022 2130 by John Nelson  Outcome: Progressing  6/5/2022 1621 by Sofie Durham RN  Outcome: Progressing  Flowsheets (Taken 6/5/2022 1600)  Verbalizes/displays adequate comfort level or baseline comfort level:   Assess pain using appropriate pain scale   Administer analgesics based on type and severity of pain and evaluate response     Problem: Safety - Adult  Goal: Free from fall injury  6/5/2022 2130 by John Nelson  Outcome: Progressing  6/5/2022 1621 by Sofie Durham RN  Outcome: Progressing     Problem: Anxiety  Goal: Will report anxiety at manageable levels  Description: INTERVENTIONS:  1. Administer medication as ordered  2. Teach and rehearse alternative coping skills  3.  Provide emotional support with 1:1 interaction with staff  6/5/2022 2130 by John Nelson  Outcome: Progressing  6/5/2022 1621 by Sofie Durham RN  Outcome: Progressing

## 2022-06-06 NOTE — CARE COORDINATION
Social Work / Discharge Planning : Patient admitted with Diverticulitis. AWait GI plan. Currently on Rocephin Q 24. Patient verified plan at discharge is HOME. Patient is totally independent. No hx of HHC/SNF. Patient PCP is DR Maria Luz Nguyen. Patient uses [facility] in [place]. If patient need IV Rocephin at discharge, he would like [facility]. Await plan. SW to follow.  Electronically signed by GINO Stevens on 6/6/22 at 1:26 PM EDT

## 2022-06-06 NOTE — H&P
GENERAL SURGERY  HISTORY AND PHYSICAL  6/6/2022    Chief Complaint   Patient presents with    Abdominal Pain     constipation x 1 week, bloating. SANDY Gasca is a 48 y.o. male who presents for evaluation of abdominal pain. He presents with complaints of 1 week of worsening constipation and right sided abdominal discomfort. He has been able to tolerate PO intake, but has had decreased appetite. Denies past abdominal surgery or endoscopy. On arrival he is afebrile and hemodynamically stable. WBC count is 16.8 this morning. CT scan revealed stranding around sigmoid colon concerning for acute diverticulitis without abscess or perforation. Past Medical History:   Diagnosis Date    Depression     Hypertension        Past Surgical History:   Procedure Laterality Date    TONSILLECTOMY         Prior to Admission medications    Medication Sig Start Date End Date Taking? Authorizing Provider   escitalopram (LEXAPRO) 20 MG tablet Take 20 mg by mouth daily    Historical Provider, MD       No Known Allergies    No family history on file. Social History     Tobacco Use    Smoking status: Never Smoker    Smokeless tobacco: Never Used   Substance Use Topics    Alcohol use: Yes     Alcohol/week: 3.0 standard drinks     Types: 3 Glasses of wine per week    Drug use: No         Review of Systems   Review of Systems   Constitutional: Positive for appetite change. Negative for activity change, fatigue and unexpected weight change. HENT: Negative. Eyes: Negative. Respiratory: Negative for chest tightness, shortness of breath and wheezing. Cardiovascular: Negative for chest pain, palpitations and leg swelling. Gastrointestinal: Positive for abdominal pain, constipation and nausea. Negative for abdominal distention and vomiting. Endocrine: Negative. Genitourinary: Negative for dysuria, hematuria and urgency. Musculoskeletal: Negative. Skin: Negative. Allergic/Immunologic: Negative. Neurological: Negative. Hematological: Negative. Psychiatric/Behavioral: Negative. All other systems reviewed and are negative. PHYSICAL EXAM:    Vitals:    06/05/22 2028   BP: 124/72   Pulse: 82   Resp: 16   Temp: 98.6 °F (37 °C)   SpO2: 96%       General Appearance:  awake, alert, oriented, in no acute distress  Skin:  Skin color, texture, turgor normal. No rashes or lesions. Head/face:  NCAT  Eyes:  No gross abnormalities. Sclera nonicteric  Lungs/Chest:  Normal expansion. No respiratory distress. Heart: Warm throughout. Regular rate   Abdomen:  Soft, mildly tender RLQ, non distended. No palpable organomegaly or masses. Extremities: Extremities warm to touch, pink, with no edema. LABS:  CBC  Recent Labs     06/06/22 0416   WBC 16.8*   HGB 13.3   HCT 41.8        BMP  Recent Labs     06/06/22 0416      K 5.2*      CO2 23   BUN 16   CREATININE 1.1   CALCIUM 8.6     Liver Function  Recent Labs     06/05/22  1234   LIPASE 18   BILITOT 0.7   AST 35   ALT 49*   ALKPHOS 125   PROT 7.2   LABALBU 3.7     No results for input(s): LACTATE in the last 72 hours. No results for input(s): INR, PTT in the last 72 hours. Invalid input(s): PT    RADIOLOGY    XR CHEST (2 VW)    Result Date: 6/5/2022  EXAMINATION: TWO XRAY VIEWS OF THE CHEST 6/5/2022 1:20 pm COMPARISON: None. HISTORY: ORDERING SYSTEM PROVIDED HISTORY: abd pain TECHNOLOGIST PROVIDED HISTORY: Reason for exam:->abd pain FINDINGS: The lungs are without acute focal process. There is no effusion or pneumothorax. The cardiomediastinal silhouette is without acute process. The osseous structures are without acute process. No acute process.      CT ABDOMEN PELVIS W IV CONTRAST Additional Contrast? None    Result Date: 6/5/2022  EXAMINATION: CT OF THE ABDOMEN AND PELVIS WITH CONTRAST 6/5/2022 2:17 pm TECHNIQUE: CT of the abdomen and pelvis was performed with the administration of intravenous contrast. Multiplanar reformatted images are provided for review. Automated exposure control, iterative reconstruction, and/or weight based adjustment of the mA/kV was utilized to reduce the radiation dose to as low as reasonably achievable. COMPARISON: None. HISTORY: ORDERING SYSTEM PROVIDED HISTORY: abd pain and constipation TECHNOLOGIST PROVIDED HISTORY: Additional Contrast?->None Reason for exam:->abd pain and constipation Decision Support Exception - unselect if not a suspected or confirmed emergency medical condition->Emergency Medical Condition (MA) FINDINGS: Lower Chest: The lung bases are grossly clear. Organs: There is diffuse fatty infiltration of the liver. The spleen is upper limits of normal in size. Some fluid seen surrounding the liver and spleen. No underlying mass or lesion. Pancreas is homogeneous. No stones seen in the gallbladder. No intrahepatic or extrahepatic biliary ductal dilatation. The adrenal glands are both unremarkable. Small cyst seen in the right kidney. Symmetric enhancement of the renal parenchyma. No stones or distension seen in the kidneys. GI/Bowel: The stomach is unremarkable. The small bowel is within normal limits. Abnormal wall thickening identified of the sigmoid colon to suggest a colitis. There are multiple diverticulum identified. Findings may suggest an acute diverticulitis. There is abnormal pericolonic stranding and fluid within the pelvis and pericolic gutters. Pelvis: The bladder reveals mild wall thickening. Correlation to urinalysis is recommended to exclude possible cystitis. The prostate is unremarkable. Peritoneum/Retroperitoneum: No abdominal retroperitoneal lymphadenopathy. There is borderline enlarged lymph nodes seen throughout the left periaortic region to suggest reactive lymph nodes. There is no pelvic adenopathy. Free fluid identified in the abdomen minimal in appearance and pericolic gutters. No extraluminal air to suggest perforation.   No loculation to suggest abscess. Bones/Soft Tissues: The bony structures reveal degenerative changes seen within the spine and pelvis. No ventral abdominal wall mass. Small umbilical hernia containing fat only. There is abnormal wall thickening identified of the sigmoid colon with surrounding stranding and fluid to suggest acute diverticulitis or focal colitis. No evidence of perforation or abscess at this time. Fluid is seen surrounding both the liver and spleen as well as throughout the small bowel mesentery. .       I have personally reviewed all relevant labs and imaging.      ASSESSMENT:  48 y.o. male with acute diverticulitis     PLAN:  - ok for CLD  - IV antibiotics  - monitor abdominal exam  - ambulate  - pain and nausea control prn     Discussed with Dr. Sandie Bautista     Electronically signed by Corrinne Boring, DO on 6/6/22 at 6:29 AM EDT

## 2022-06-07 ENCOUNTER — APPOINTMENT (OUTPATIENT)
Dept: CT IMAGING | Age: 47
DRG: 329 | End: 2022-06-07
Payer: COMMERCIAL

## 2022-06-07 ENCOUNTER — APPOINTMENT (OUTPATIENT)
Dept: GENERAL RADIOLOGY | Age: 47
DRG: 329 | End: 2022-06-07
Payer: COMMERCIAL

## 2022-06-07 LAB
ANION GAP SERPL CALCULATED.3IONS-SCNC: 9 MMOL/L (ref 7–16)
BASOPHILS ABSOLUTE: 0.05 E9/L (ref 0–0.2)
BASOPHILS RELATIVE PERCENT: 0.3 % (ref 0–2)
BUN BLDV-MCNC: 20 MG/DL (ref 6–20)
CALCIUM SERPL-MCNC: 9 MG/DL (ref 8.6–10.2)
CHLORIDE BLD-SCNC: 106 MMOL/L (ref 98–107)
CO2: 25 MMOL/L (ref 22–29)
CREAT SERPL-MCNC: 1 MG/DL (ref 0.7–1.2)
EOSINOPHILS ABSOLUTE: 0.07 E9/L (ref 0.05–0.5)
EOSINOPHILS RELATIVE PERCENT: 0.4 % (ref 0–6)
GFR AFRICAN AMERICAN: >60
GFR NON-AFRICAN AMERICAN: >60 ML/MIN/1.73
GLUCOSE BLD-MCNC: 149 MG/DL (ref 74–99)
HCT VFR BLD CALC: 44.8 % (ref 37–54)
HEMOGLOBIN: 14.5 G/DL (ref 12.5–16.5)
IMMATURE GRANULOCYTES #: 0.26 E9/L
IMMATURE GRANULOCYTES %: 1.5 % (ref 0–5)
LYMPHOCYTES ABSOLUTE: 0.73 E9/L (ref 1.5–4)
LYMPHOCYTES RELATIVE PERCENT: 4.1 % (ref 20–42)
MCH RBC QN AUTO: 28 PG (ref 26–35)
MCHC RBC AUTO-ENTMCNC: 32.4 % (ref 32–34.5)
MCV RBC AUTO: 86.7 FL (ref 80–99.9)
MONOCYTES ABSOLUTE: 0.76 E9/L (ref 0.1–0.95)
MONOCYTES RELATIVE PERCENT: 4.3 % (ref 2–12)
NEUTROPHILS ABSOLUTE: 15.9 E9/L (ref 1.8–7.3)
NEUTROPHILS RELATIVE PERCENT: 89.4 % (ref 43–80)
PDW BLD-RTO: 14.6 FL (ref 11.5–15)
PLATELET # BLD: 360 E9/L (ref 130–450)
PMV BLD AUTO: 10.1 FL (ref 7–12)
POTASSIUM SERPL-SCNC: 4.1 MMOL/L (ref 3.5–5)
RBC # BLD: 5.17 E12/L (ref 3.8–5.8)
SODIUM BLD-SCNC: 140 MMOL/L (ref 132–146)
WBC # BLD: 17.8 E9/L (ref 4.5–11.5)

## 2022-06-07 PROCEDURE — 2500000003 HC RX 250 WO HCPCS: Performed by: SURGERY

## 2022-06-07 PROCEDURE — 80048 BASIC METABOLIC PNL TOTAL CA: CPT

## 2022-06-07 PROCEDURE — 6360000004 HC RX CONTRAST MEDICATION: Performed by: RADIOLOGY

## 2022-06-07 PROCEDURE — 6360000002 HC RX W HCPCS: Performed by: SURGERY

## 2022-06-07 PROCEDURE — 6370000000 HC RX 637 (ALT 250 FOR IP): Performed by: STUDENT IN AN ORGANIZED HEALTH CARE EDUCATION/TRAINING PROGRAM

## 2022-06-07 PROCEDURE — 2580000003 HC RX 258: Performed by: STUDENT IN AN ORGANIZED HEALTH CARE EDUCATION/TRAINING PROGRAM

## 2022-06-07 PROCEDURE — 1200000000 HC SEMI PRIVATE

## 2022-06-07 PROCEDURE — 85025 COMPLETE CBC W/AUTO DIFF WBC: CPT

## 2022-06-07 PROCEDURE — 36415 COLL VENOUS BLD VENIPUNCTURE: CPT

## 2022-06-07 PROCEDURE — 71045 X-RAY EXAM CHEST 1 VIEW: CPT

## 2022-06-07 PROCEDURE — 74177 CT ABD & PELVIS W/CONTRAST: CPT

## 2022-06-07 PROCEDURE — 6360000002 HC RX W HCPCS: Performed by: STUDENT IN AN ORGANIZED HEALTH CARE EDUCATION/TRAINING PROGRAM

## 2022-06-07 RX ORDER — ACETAMINOPHEN 500 MG
1000 TABLET ORAL EVERY 8 HOURS
Status: DISCONTINUED | OUTPATIENT
Start: 2022-06-07 | End: 2022-06-20 | Stop reason: HOSPADM

## 2022-06-07 RX ORDER — METHOCARBAMOL 500 MG/1
500 TABLET, FILM COATED ORAL 4 TIMES DAILY
Status: DISCONTINUED | OUTPATIENT
Start: 2022-06-07 | End: 2022-06-07

## 2022-06-07 RX ORDER — ENOXAPARIN SODIUM 100 MG/ML
30 INJECTION SUBCUTANEOUS 2 TIMES DAILY
Status: DISCONTINUED | OUTPATIENT
Start: 2022-06-07 | End: 2022-06-20 | Stop reason: HOSPADM

## 2022-06-07 RX ORDER — OXYCODONE HYDROCHLORIDE 5 MG/1
5 TABLET ORAL EVERY 4 HOURS PRN
Status: DISCONTINUED | OUTPATIENT
Start: 2022-06-07 | End: 2022-06-08

## 2022-06-07 RX ORDER — SODIUM CHLORIDE, SODIUM LACTATE, POTASSIUM CHLORIDE, CALCIUM CHLORIDE 600; 310; 30; 20 MG/100ML; MG/100ML; MG/100ML; MG/100ML
INJECTION, SOLUTION INTRAVENOUS CONTINUOUS
Status: DISCONTINUED | OUTPATIENT
Start: 2022-06-07 | End: 2022-06-13

## 2022-06-07 RX ORDER — ONDANSETRON 2 MG/ML
4 INJECTION INTRAMUSCULAR; INTRAVENOUS EVERY 6 HOURS PRN
Status: DISCONTINUED | OUTPATIENT
Start: 2022-06-07 | End: 2022-06-20 | Stop reason: HOSPADM

## 2022-06-07 RX ADMIN — HYDROMORPHONE HYDROCHLORIDE 0.25 MG: 1 INJECTION, SOLUTION INTRAMUSCULAR; INTRAVENOUS; SUBCUTANEOUS at 23:26

## 2022-06-07 RX ADMIN — METRONIDAZOLE 500 MG: 500 INJECTION, SOLUTION INTRAVENOUS at 07:29

## 2022-06-07 RX ADMIN — HYDROMORPHONE HYDROCHLORIDE 0.25 MG: 1 INJECTION, SOLUTION INTRAMUSCULAR; INTRAVENOUS; SUBCUTANEOUS at 20:32

## 2022-06-07 RX ADMIN — PIPERACILLIN SODIUM AND TAZOBACTAM SODIUM 4500 MG: 4; .5 INJECTION, POWDER, LYOPHILIZED, FOR SOLUTION INTRAVENOUS at 21:00

## 2022-06-07 RX ADMIN — HYDROMORPHONE HYDROCHLORIDE 0.25 MG: 1 INJECTION, SOLUTION INTRAMUSCULAR; INTRAVENOUS; SUBCUTANEOUS at 04:11

## 2022-06-07 RX ADMIN — METHOCARBAMOL TABLETS 500 MG: 500 TABLET, COATED ORAL at 09:58

## 2022-06-07 RX ADMIN — HYDROMORPHONE HYDROCHLORIDE 0.25 MG: 1 INJECTION, SOLUTION INTRAMUSCULAR; INTRAVENOUS; SUBCUTANEOUS at 07:26

## 2022-06-07 RX ADMIN — ONDANSETRON 4 MG: 2 INJECTION INTRAMUSCULAR; INTRAVENOUS at 16:34

## 2022-06-07 RX ADMIN — SODIUM CHLORIDE, POTASSIUM CHLORIDE, SODIUM LACTATE AND CALCIUM CHLORIDE: 600; 310; 30; 20 INJECTION, SOLUTION INTRAVENOUS at 23:28

## 2022-06-07 RX ADMIN — PIPERACILLIN SODIUM AND TAZOBACTAM SODIUM 4500 MG: 4; .5 INJECTION, POWDER, LYOPHILIZED, FOR SOLUTION INTRAVENOUS at 13:21

## 2022-06-07 RX ADMIN — HYDROMORPHONE HYDROCHLORIDE 0.25 MG: 1 INJECTION, SOLUTION INTRAMUSCULAR; INTRAVENOUS; SUBCUTANEOUS at 16:34

## 2022-06-07 RX ADMIN — HYDROMORPHONE HYDROCHLORIDE 0.25 MG: 1 INJECTION, SOLUTION INTRAMUSCULAR; INTRAVENOUS; SUBCUTANEOUS at 12:25

## 2022-06-07 RX ADMIN — IOPAMIDOL 75 ML: 755 INJECTION, SOLUTION INTRAVENOUS at 15:27

## 2022-06-07 RX ADMIN — IOHEXOL 50 ML: 240 INJECTION, SOLUTION INTRATHECAL; INTRAVASCULAR; INTRAVENOUS; ORAL at 15:27

## 2022-06-07 RX ADMIN — ACETAMINOPHEN 1000 MG: 500 TABLET ORAL at 08:52

## 2022-06-07 RX ADMIN — ENOXAPARIN SODIUM 30 MG: 100 INJECTION SUBCUTANEOUS at 19:44

## 2022-06-07 RX ADMIN — METHOCARBAMOL 500 MG: 100 INJECTION, SOLUTION INTRAMUSCULAR; INTRAVENOUS at 16:54

## 2022-06-07 RX ADMIN — SODIUM CHLORIDE, POTASSIUM CHLORIDE, SODIUM LACTATE AND CALCIUM CHLORIDE: 600; 310; 30; 20 INJECTION, SOLUTION INTRAVENOUS at 17:29

## 2022-06-07 ASSESSMENT — PAIN DESCRIPTION - ONSET
ONSET: ON-GOING

## 2022-06-07 ASSESSMENT — PAIN SCALES - GENERAL
PAINLEVEL_OUTOF10: 4
PAINLEVEL_OUTOF10: 7
PAINLEVEL_OUTOF10: 8
PAINLEVEL_OUTOF10: 9
PAINLEVEL_OUTOF10: 2
PAINLEVEL_OUTOF10: 2
PAINLEVEL_OUTOF10: 7
PAINLEVEL_OUTOF10: 8
PAINLEVEL_OUTOF10: 8
PAINLEVEL_OUTOF10: 4
PAINLEVEL_OUTOF10: 6
PAINLEVEL_OUTOF10: 7

## 2022-06-07 ASSESSMENT — PAIN DESCRIPTION - DESCRIPTORS
DESCRIPTORS: PRESSURE
DESCRIPTORS: DISCOMFORT;PRESSURE
DESCRIPTORS: PRESSURE;DISCOMFORT
DESCRIPTORS: DISCOMFORT;SHARP;NUMBNESS
DESCRIPTORS: PRESSURE;DISCOMFORT
DESCRIPTORS: DISCOMFORT;PRESSURE
DESCRIPTORS: ACHING;THROBBING

## 2022-06-07 ASSESSMENT — PAIN DESCRIPTION - FREQUENCY
FREQUENCY: CONTINUOUS

## 2022-06-07 ASSESSMENT — PAIN DESCRIPTION - LOCATION
LOCATION: ABDOMEN

## 2022-06-07 ASSESSMENT — PAIN DESCRIPTION - PAIN TYPE
TYPE: ACUTE PAIN

## 2022-06-07 ASSESSMENT — PAIN - FUNCTIONAL ASSESSMENT
PAIN_FUNCTIONAL_ASSESSMENT: ACTIVITIES ARE NOT PREVENTED

## 2022-06-07 ASSESSMENT — PAIN DESCRIPTION - ORIENTATION
ORIENTATION: MID

## 2022-06-07 NOTE — PLAN OF CARE
Problem: Pain  Goal: Verbalizes/displays adequate comfort level or baseline comfort level  6/6/2022 2324 by Deedee Jorge  Outcome: Progressing  6/6/2022 1110 by Catia Arriaga RN  Outcome: Progressing     Problem: Safety - Adult  Goal: Free from fall injury  6/6/2022 2324 by Deedee Jorge  Outcome: Progressing  6/6/2022 1110 by Catia Arriaga RN  Outcome: Progressing     Problem: Anxiety  Goal: Will report anxiety at manageable levels  Description: INTERVENTIONS:  1. Administer medication as ordered  2. Teach and rehearse alternative coping skills  3.  Provide emotional support with 1:1 interaction with staff  6/6/2022 2324 by Deedee Jorge  Outcome: Progressing  6/6/2022 1110 by Catia Arriaga RN  Outcome: Progressing

## 2022-06-07 NOTE — PROGRESS NOTES
GENERAL SURGERY  DAILY PROGRESS NOTE  6/7/2022    Subjective: Told us on rounds that his pain is improved but nursing staff with multiple pages to attending surgeon regarding severe pain and nausea overnight. Patient was made strict NPO but staff apparently still bringing him many liquids overnight even with his pain. Objective:  BP (!) 164/88   Pulse (!) 106   Temp 97.7 °F (36.5 °C) (Oral)   Resp 16   Ht 5' 7\" (1.702 m)   Wt 236 lb (107 kg)   SpO2 93%   BMI 36.96 kg/m²     General Appearance:  awake, alert, oriented, uncomfortable   Skin:  Skin color, texture, turgor normal  Head/face:  NCAT  Eyes:  No gross abnormalities. Sclera nonicteric  Lungs/Chest:  Normal expansion. No respiratory distress. Heart: Warm throughout. Tachycardic   Abdomen:  Soft, moderately tender lower abdomen,  distended. Extremities: Extremities warm to touch, pink, with no edema. I have personally reviewed all relevant labs and imaging. Assessment/Plan:  48 y.o. male with acute diverticulitis     - NPO  - IV fluids  - continue IV antibiotics   - pain and nausea control prn  - ambulate     Electronically signed by Andre Gray DO on 6/7/2022 at 6:05 AM     Pt seen and examined  More distended today after taking in too much cld  No flatus or bm  He looks like he feels worse    Tympanic and distended, not peritoneal    Npo  Will order CT w IV and PO as his exam has changed and wbc increasing  Iv abx    Angela Cramer MD  Minimally Invasive General Surgery and Endoscopy  [address] [place] [address].  [address]: [phone]  F: [phone]    Electronically signed by Nilay Maddox MD on 6/7/2022 at 12:38 PM

## 2022-06-07 NOTE — CARE COORDINATION
CASE MANAGEMENT. ... Chart reviewed. Sx following. Patient npo. Continues on iv rocephin and iv flagyl. Requiring iv dilaudid prn atc. Plan is home. If iv atbs needed qd at dc, patient requesting [address] [facility] [address]. Will follow.

## 2022-06-07 NOTE — PROGRESS NOTES
Patient tried to have a bowel movement and was unsuccessful. Started having increased abdominal pain after that 10/10. Abdomen is distended and rigid. Notified Dr. Angelika Simon of increased pain, changed Dilaudid order to every three hours PRN instead of four. Also ordered patient to be NPO, diet order changed.

## 2022-06-08 ENCOUNTER — ANESTHESIA (OUTPATIENT)
Dept: OPERATING ROOM | Age: 47
DRG: 329 | End: 2022-06-08
Payer: COMMERCIAL

## 2022-06-08 ENCOUNTER — APPOINTMENT (OUTPATIENT)
Dept: GENERAL RADIOLOGY | Age: 47
DRG: 329 | End: 2022-06-08
Payer: COMMERCIAL

## 2022-06-08 ENCOUNTER — ANESTHESIA EVENT (OUTPATIENT)
Dept: OPERATING ROOM | Age: 47
DRG: 329 | End: 2022-06-08
Payer: COMMERCIAL

## 2022-06-08 LAB
ANION GAP SERPL CALCULATED.3IONS-SCNC: 12 MMOL/L (ref 7–16)
BASOPHILS ABSOLUTE: 0.06 E9/L (ref 0–0.2)
BASOPHILS RELATIVE PERCENT: 0.4 % (ref 0–2)
BUN BLDV-MCNC: 25 MG/DL (ref 6–20)
C-REACTIVE PROTEIN: 29.6 MG/DL (ref 0–0.4)
CALCIUM SERPL-MCNC: 8.8 MG/DL (ref 8.6–10.2)
CEA: 0.5 NG/ML (ref 0–5.2)
CHLORIDE BLD-SCNC: 105 MMOL/L (ref 98–107)
CO2: 26 MMOL/L (ref 22–29)
CREAT SERPL-MCNC: 1.1 MG/DL (ref 0.7–1.2)
EOSINOPHILS ABSOLUTE: 0.19 E9/L (ref 0.05–0.5)
EOSINOPHILS RELATIVE PERCENT: 1.1 % (ref 0–6)
GFR AFRICAN AMERICAN: >60
GFR NON-AFRICAN AMERICAN: >60 ML/MIN/1.73
GLUCOSE BLD-MCNC: 147 MG/DL (ref 74–99)
HCT VFR BLD CALC: 40.1 % (ref 37–54)
HEMOGLOBIN: 12.8 G/DL (ref 12.5–16.5)
IMMATURE GRANULOCYTES #: 0.26 E9/L
IMMATURE GRANULOCYTES %: 1.5 % (ref 0–5)
LYMPHOCYTES ABSOLUTE: 1.2 E9/L (ref 1.5–4)
LYMPHOCYTES RELATIVE PERCENT: 7.1 % (ref 20–42)
MAGNESIUM: 2.3 MG/DL (ref 1.6–2.6)
MCH RBC QN AUTO: 27.8 PG (ref 26–35)
MCHC RBC AUTO-ENTMCNC: 31.9 % (ref 32–34.5)
MCV RBC AUTO: 87.2 FL (ref 80–99.9)
MONOCYTES ABSOLUTE: 0.72 E9/L (ref 0.1–0.95)
MONOCYTES RELATIVE PERCENT: 4.3 % (ref 2–12)
NEUTROPHILS ABSOLUTE: 14.51 E9/L (ref 1.8–7.3)
NEUTROPHILS RELATIVE PERCENT: 85.6 % (ref 43–80)
PDW BLD-RTO: 14.8 FL (ref 11.5–15)
PHOSPHORUS: 2.9 MG/DL (ref 2.5–4.5)
PLATELET # BLD: 401 E9/L (ref 130–450)
PMV BLD AUTO: 10 FL (ref 7–12)
POTASSIUM SERPL-SCNC: 4.1 MMOL/L (ref 3.5–5)
RBC # BLD: 4.6 E12/L (ref 3.8–5.8)
SEDIMENTATION RATE, ERYTHROCYTE: 58 MM/HR (ref 0–15)
SODIUM BLD-SCNC: 143 MMOL/L (ref 132–146)
WBC # BLD: 16.9 E9/L (ref 4.5–11.5)

## 2022-06-08 PROCEDURE — 6360000002 HC RX W HCPCS: Performed by: SURGERY

## 2022-06-08 PROCEDURE — 36415 COLL VENOUS BLD VENIPUNCTURE: CPT

## 2022-06-08 PROCEDURE — 83735 ASSAY OF MAGNESIUM: CPT

## 2022-06-08 PROCEDURE — 6360000002 HC RX W HCPCS: Performed by: NURSE ANESTHETIST, CERTIFIED REGISTERED

## 2022-06-08 PROCEDURE — 86140 C-REACTIVE PROTEIN: CPT

## 2022-06-08 PROCEDURE — 3600007511: Performed by: SURGERY

## 2022-06-08 PROCEDURE — 82378 CARCINOEMBRYONIC ANTIGEN: CPT

## 2022-06-08 PROCEDURE — 80048 BASIC METABOLIC PNL TOTAL CA: CPT

## 2022-06-08 PROCEDURE — 2580000003 HC RX 258: Performed by: STUDENT IN AN ORGANIZED HEALTH CARE EDUCATION/TRAINING PROGRAM

## 2022-06-08 PROCEDURE — 7100000001 HC PACU RECOVERY - ADDTL 15 MIN: Performed by: SURGERY

## 2022-06-08 PROCEDURE — 7100000000 HC PACU RECOVERY - FIRST 15 MIN: Performed by: SURGERY

## 2022-06-08 PROCEDURE — 6360000002 HC RX W HCPCS: Performed by: STUDENT IN AN ORGANIZED HEALTH CARE EDUCATION/TRAINING PROGRAM

## 2022-06-08 PROCEDURE — 2500000003 HC RX 250 WO HCPCS: Performed by: NURSE ANESTHETIST, CERTIFIED REGISTERED

## 2022-06-08 PROCEDURE — 2580000003 HC RX 258: Performed by: NURSE ANESTHETIST, CERTIFIED REGISTERED

## 2022-06-08 PROCEDURE — 85025 COMPLETE CBC W/AUTO DIFF WBC: CPT

## 2022-06-08 PROCEDURE — 3700000000 HC ANESTHESIA ATTENDED CARE: Performed by: SURGERY

## 2022-06-08 PROCEDURE — 84100 ASSAY OF PHOSPHORUS: CPT

## 2022-06-08 PROCEDURE — 0DJD8ZZ INSPECTION OF LOWER INTESTINAL TRACT, VIA NATURAL OR ARTIFICIAL OPENING ENDOSCOPIC: ICD-10-PCS | Performed by: SURGERY

## 2022-06-08 PROCEDURE — 85651 RBC SED RATE NONAUTOMATED: CPT

## 2022-06-08 PROCEDURE — 1200000000 HC SEMI PRIVATE

## 2022-06-08 PROCEDURE — 74018 RADEX ABDOMEN 1 VIEW: CPT

## 2022-06-08 PROCEDURE — 2700000000 HC OXYGEN THERAPY PER DAY

## 2022-06-08 PROCEDURE — 2709999900 HC NON-CHARGEABLE SUPPLY: Performed by: SURGERY

## 2022-06-08 PROCEDURE — 3700000001 HC ADD 15 MINUTES (ANESTHESIA): Performed by: SURGERY

## 2022-06-08 PROCEDURE — 3600007501: Performed by: SURGERY

## 2022-06-08 RX ORDER — PROPOFOL 10 MG/ML
INJECTION, EMULSION INTRAVENOUS PRN
Status: DISCONTINUED | OUTPATIENT
Start: 2022-06-08 | End: 2022-06-08 | Stop reason: SDUPTHER

## 2022-06-08 RX ORDER — HYDRALAZINE HYDROCHLORIDE 20 MG/ML
5 INJECTION INTRAMUSCULAR; INTRAVENOUS
Status: DISCONTINUED | OUTPATIENT
Start: 2022-06-08 | End: 2022-06-13 | Stop reason: HOSPADM

## 2022-06-08 RX ORDER — ROCURONIUM BROMIDE 10 MG/ML
INJECTION, SOLUTION INTRAVENOUS PRN
Status: DISCONTINUED | OUTPATIENT
Start: 2022-06-08 | End: 2022-06-08 | Stop reason: SDUPTHER

## 2022-06-08 RX ORDER — LABETALOL HYDROCHLORIDE 5 MG/ML
5 INJECTION, SOLUTION INTRAVENOUS
Status: DISCONTINUED | OUTPATIENT
Start: 2022-06-08 | End: 2022-06-13 | Stop reason: HOSPADM

## 2022-06-08 RX ORDER — DEXAMETHASONE SODIUM PHOSPHATE 4 MG/ML
INJECTION, SOLUTION INTRA-ARTICULAR; INTRALESIONAL; INTRAMUSCULAR; INTRAVENOUS; SOFT TISSUE PRN
Status: DISCONTINUED | OUTPATIENT
Start: 2022-06-08 | End: 2022-06-08 | Stop reason: SDUPTHER

## 2022-06-08 RX ORDER — SODIUM CHLORIDE 0.9 % (FLUSH) 0.9 %
5-40 SYRINGE (ML) INJECTION PRN
Status: DISCONTINUED | OUTPATIENT
Start: 2022-06-08 | End: 2022-06-13 | Stop reason: HOSPADM

## 2022-06-08 RX ORDER — SODIUM CHLORIDE 9 MG/ML
INJECTION, SOLUTION INTRAVENOUS PRN
Status: DISCONTINUED | OUTPATIENT
Start: 2022-06-08 | End: 2022-06-13 | Stop reason: HOSPADM

## 2022-06-08 RX ORDER — KETOROLAC TROMETHAMINE 30 MG/ML
15 INJECTION, SOLUTION INTRAMUSCULAR; INTRAVENOUS EVERY 6 HOURS
Status: DISPENSED | OUTPATIENT
Start: 2022-06-08 | End: 2022-06-13

## 2022-06-08 RX ORDER — PROCHLORPERAZINE EDISYLATE 5 MG/ML
5 INJECTION INTRAMUSCULAR; INTRAVENOUS
Status: ACTIVE | OUTPATIENT
Start: 2022-06-08 | End: 2022-06-08

## 2022-06-08 RX ORDER — MEPERIDINE HYDROCHLORIDE 25 MG/ML
12.5 INJECTION INTRAMUSCULAR; INTRAVENOUS; SUBCUTANEOUS
Status: ACTIVE | OUTPATIENT
Start: 2022-06-08 | End: 2022-06-08

## 2022-06-08 RX ORDER — SODIUM CHLORIDE 0.9 % (FLUSH) 0.9 %
5-40 SYRINGE (ML) INJECTION EVERY 12 HOURS SCHEDULED
Status: DISCONTINUED | OUTPATIENT
Start: 2022-06-08 | End: 2022-06-13 | Stop reason: HOSPADM

## 2022-06-08 RX ORDER — DIPHENHYDRAMINE HYDROCHLORIDE 50 MG/ML
12.5 INJECTION INTRAMUSCULAR; INTRAVENOUS
Status: ACTIVE | OUTPATIENT
Start: 2022-06-08 | End: 2022-06-08

## 2022-06-08 RX ORDER — SODIUM CHLORIDE 9 MG/ML
INJECTION, SOLUTION INTRAVENOUS CONTINUOUS PRN
Status: DISCONTINUED | OUTPATIENT
Start: 2022-06-08 | End: 2022-06-08 | Stop reason: SDUPTHER

## 2022-06-08 RX ORDER — FENTANYL CITRATE 50 UG/ML
INJECTION, SOLUTION INTRAMUSCULAR; INTRAVENOUS PRN
Status: DISCONTINUED | OUTPATIENT
Start: 2022-06-08 | End: 2022-06-08 | Stop reason: SDUPTHER

## 2022-06-08 RX ORDER — ONDANSETRON 2 MG/ML
INJECTION INTRAMUSCULAR; INTRAVENOUS PRN
Status: DISCONTINUED | OUTPATIENT
Start: 2022-06-08 | End: 2022-06-08 | Stop reason: SDUPTHER

## 2022-06-08 RX ORDER — FENTANYL CITRATE 50 UG/ML
25 INJECTION, SOLUTION INTRAMUSCULAR; INTRAVENOUS EVERY 5 MIN PRN
Status: DISCONTINUED | OUTPATIENT
Start: 2022-06-08 | End: 2022-06-13 | Stop reason: HOSPADM

## 2022-06-08 RX ADMIN — KETOROLAC TROMETHAMINE 15 MG: 30 INJECTION, SOLUTION INTRAMUSCULAR; INTRAVENOUS at 19:26

## 2022-06-08 RX ADMIN — METHOCARBAMOL 500 MG: 100 INJECTION, SOLUTION INTRAMUSCULAR; INTRAVENOUS at 10:04

## 2022-06-08 RX ADMIN — SODIUM CHLORIDE, POTASSIUM CHLORIDE, SODIUM LACTATE AND CALCIUM CHLORIDE: 600; 310; 30; 20 INJECTION, SOLUTION INTRAVENOUS at 19:30

## 2022-06-08 RX ADMIN — ROCURONIUM BROMIDE 5 MG: 10 INJECTION, SOLUTION INTRAVENOUS at 11:52

## 2022-06-08 RX ADMIN — SODIUM CHLORIDE, POTASSIUM CHLORIDE, SODIUM LACTATE AND CALCIUM CHLORIDE: 600; 310; 30; 20 INJECTION, SOLUTION INTRAVENOUS at 04:51

## 2022-06-08 RX ADMIN — DEXAMETHASONE SODIUM PHOSPHATE 8 MG: 4 INJECTION, SOLUTION INTRAMUSCULAR; INTRAVENOUS at 12:33

## 2022-06-08 RX ADMIN — PROPOFOL 150 MG: 10 INJECTION, EMULSION INTRAVENOUS at 11:52

## 2022-06-08 RX ADMIN — ENOXAPARIN SODIUM 30 MG: 100 INJECTION SUBCUTANEOUS at 21:35

## 2022-06-08 RX ADMIN — ONDANSETRON 4 MG: 2 INJECTION INTRAMUSCULAR; INTRAVENOUS at 12:33

## 2022-06-08 RX ADMIN — FENTANYL CITRATE 100 MCG: 50 INJECTION, SOLUTION INTRAMUSCULAR; INTRAVENOUS at 11:52

## 2022-06-08 RX ADMIN — HYDROMORPHONE HYDROCHLORIDE 0.25 MG: 1 INJECTION, SOLUTION INTRAMUSCULAR; INTRAVENOUS; SUBCUTANEOUS at 04:47

## 2022-06-08 RX ADMIN — ROCURONIUM BROMIDE 15 MG: 10 INJECTION, SOLUTION INTRAVENOUS at 12:04

## 2022-06-08 RX ADMIN — METHOCARBAMOL 500 MG: 100 INJECTION, SOLUTION INTRAMUSCULAR; INTRAVENOUS at 01:21

## 2022-06-08 RX ADMIN — SUGAMMADEX 428 MG: 100 INJECTION, SOLUTION INTRAVENOUS at 12:36

## 2022-06-08 RX ADMIN — PIPERACILLIN SODIUM AND TAZOBACTAM SODIUM 4500 MG: 4; .5 INJECTION, POWDER, LYOPHILIZED, FOR SOLUTION INTRAVENOUS at 04:49

## 2022-06-08 RX ADMIN — METHOCARBAMOL 500 MG: 100 INJECTION, SOLUTION INTRAMUSCULAR; INTRAVENOUS at 17:49

## 2022-06-08 RX ADMIN — PIPERACILLIN SODIUM AND TAZOBACTAM SODIUM 4500 MG: 4; .5 INJECTION, POWDER, LYOPHILIZED, FOR SOLUTION INTRAVENOUS at 21:37

## 2022-06-08 RX ADMIN — SODIUM CHLORIDE: 9 INJECTION, SOLUTION INTRAVENOUS at 11:47

## 2022-06-08 RX ADMIN — KETOROLAC TROMETHAMINE 15 MG: 30 INJECTION, SOLUTION INTRAMUSCULAR; INTRAVENOUS at 06:55

## 2022-06-08 ASSESSMENT — PAIN SCALES - GENERAL
PAINLEVEL_OUTOF10: 7
PAINLEVEL_OUTOF10: 0
PAINLEVEL_OUTOF10: 7
PAINLEVEL_OUTOF10: 3

## 2022-06-08 ASSESSMENT — PAIN - FUNCTIONAL ASSESSMENT: PAIN_FUNCTIONAL_ASSESSMENT: ACTIVITIES ARE NOT PREVENTED

## 2022-06-08 ASSESSMENT — PAIN DESCRIPTION - PAIN TYPE: TYPE: ACUTE PAIN

## 2022-06-08 ASSESSMENT — PAIN DESCRIPTION - ORIENTATION: ORIENTATION: MID

## 2022-06-08 ASSESSMENT — PAIN DESCRIPTION - ONSET: ONSET: ON-GOING

## 2022-06-08 ASSESSMENT — PAIN DESCRIPTION - FREQUENCY: FREQUENCY: CONTINUOUS

## 2022-06-08 ASSESSMENT — PAIN DESCRIPTION - DESCRIPTORS: DESCRIPTORS: DISCOMFORT;PRESSURE

## 2022-06-08 ASSESSMENT — LIFESTYLE VARIABLES: SMOKING_STATUS: 0

## 2022-06-08 ASSESSMENT — PAIN DESCRIPTION - LOCATION: LOCATION: ABDOMEN

## 2022-06-08 NOTE — PLAN OF CARE
Problem: Pain  Goal: Verbalizes/displays adequate comfort level or baseline comfort level  Outcome: Progressing  Flowsheets  Taken 6/8/2022 1330 by Juan Brown RN  Verbalizes/displays adequate comfort level or baseline comfort level: Administer analgesics based on type and severity of pain and evaluate response  Taken 6/8/2022 1315 by Juan Brown RN  Verbalizes/displays adequate comfort level or baseline comfort level: Administer analgesics based on type and severity of pain and evaluate response  Taken 6/8/2022 1254 by Juan Brown RN  Verbalizes/displays adequate comfort level or baseline comfort level: Administer analgesics based on type and severity of pain and evaluate response

## 2022-06-08 NOTE — ANESTHESIA PRE PROCEDURE
Department of Anesthesiology  Preprocedure Note       Name:  Jos Park   Age:  48 y.o.  :  1975                                          MRN:  [id number]         Date:  2022      Surgeon: Ana Ceron):  Carter Benjamin MD    Procedure: Procedure(s):  SIGMOIDOSCOPY DIAGNOSTIC FLEXIBLE    Medications prior to admission:   Prior to Admission medications    Medication Sig Start Date End Date Taking? Authorizing Provider   escitalopram (LEXAPRO) 20 MG tablet Take 20 mg by mouth daily    Historical Provider, MD       Current medications:    No current facility-administered medications for this visit. No current outpatient medications on file.      Facility-Administered Medications Ordered in Other Visits   Medication Dose Route Frequency Provider Last Rate Last Admin    ketorolac (TORADOL) injection 15 mg  15 mg IntraVENous Q6H Berny Yousif DO   15 mg at 22 0655    acetaminophen (TYLENOL) tablet 1,000 mg  1,000 mg Oral q8h Lorelei Castrejon MD   1,000 mg at 22 0852    ondansetron (ZOFRAN) injection 4 mg  4 mg IntraVENous Q6H PRN Lorelei Castrejon MD   4 mg at 22 1634    piperacillin-tazobactam (ZOSYN) 4,500 mg in dextrose 5 % 100 mL IVPB (mini-bag)  4,500 mg IntraVENous Prashanth Kaiser MD   Stopped at 22 0422    lactated ringers infusion   IntraVENous Continuous Lorelei Castrejon  mL/hr at 22 0508 Rate Verify at 22 0508    methocarbamol (ROBAXIN) 500 mg in dextrose 5 % 100 mL IVPB  500 mg IntraVENous Prashanth Kaiser  mL/hr at 22 1004 500 mg at 22 1004    enoxaparin Sodium (LOVENOX) injection 30 mg  30 mg SubCUTAneous BID Lorelei Castrejon MD   30 mg at 22 1944    HYDROmorphone (DILAUDID) injection 0.25 mg  0.25 mg IntraVENous Q3H PRN Carter Benjamin MD   0.25 mg at 22 0447       Allergies:  No Known Allergies    Problem List:    Patient Active Problem List   Diagnosis Code    Acute bilateral thoracic back pain M54.6    Thoracic spine tumor D49.2    Diverticulitis K57.92       Past Medical History:        Diagnosis Date    Depression     Hypertension        Past Surgical History:        Procedure Laterality Date    TONSILLECTOMY         Social History:    Social History     Tobacco Use    Smoking status: Never Smoker    Smokeless tobacco: Never Used   Substance Use Topics    Alcohol use: Yes     Alcohol/week: 3.0 standard drinks     Types: 3 Glasses of wine per week                                Counseling given: Not Answered      Vital Signs (Current): There were no vitals filed for this visit. BP Readings from Last 3 Encounters:   06/08/22 (!) 142/93   01/18/21 (!) 170/11   01/28/17 (!) 157/107       NPO Status:                                                                                 BMI:   Wt Readings from Last 3 Encounters:   06/08/22 236 lb (107 kg)   01/18/21 203 lb (92.1 kg)   01/28/17 203 lb (92.1 kg)     There is no height or weight on file to calculate BMI.    CBC:   Lab Results   Component Value Date    WBC 16.9 06/08/2022    RBC 4.60 06/08/2022    HGB 12.8 06/08/2022    HCT 40.1 06/08/2022    MCV 87.2 06/08/2022    RDW 14.8 06/08/2022     06/08/2022       CMP:   Lab Results   Component Value Date     06/08/2022    K 4.1 06/08/2022    K 3.7 06/05/2022     06/08/2022    CO2 26 06/08/2022    BUN 25 06/08/2022    CREATININE 1.1 06/08/2022    GFRAA >60 06/08/2022    LABGLOM >60 06/08/2022    GLUCOSE 147 06/08/2022    PROT 7.2 06/05/2022    CALCIUM 8.8 06/08/2022    BILITOT 0.7 06/05/2022    ALKPHOS 125 06/05/2022    AST 35 06/05/2022    ALT 49 06/05/2022       POC Tests: No results for input(s): POCGLU, POCNA, POCK, POCCL, POCBUN, POCHEMO, POCHCT in the last 72 hours.     Coags:   Lab Results   Component Value Date    PROTIME 11.2 01/23/2017    INR 1.0 01/23/2017       HCG (If Applicable): No results found for: PREGTESTUR, PREGSERUM, HCG, HCGQUANT     ABGs: No results found for: PHART, PO2ART, JNU7PKF, MSO2PHR, BEART, U4PJGXTG     Type & Screen (If Applicable):  No results found for: LABABO, LABRH    Drug/Infectious Status (If Applicable):  No results found for: HIV, HEPCAB    COVID-19 Screening (If Applicable): No results found for: COVID19      Impression   Distended fluid-filled loops of small bowel throughout the abdomen and pelvis   with distension is well seen of the stomach to suggest ileus. Angela Olivia is air   identified throughout the colon with no evidence of transition.  Improvement   seen in the wall thickening of the sigmoid colon however with development of   a small abscess between the bladder and rectum just superior to the prostate. The amount of fluid within the abdomen and pelvis is similar when compared to   the prior examination.               Anesthesia Evaluation  Patient summary reviewed and Nursing notes reviewed no history of anesthetic complications:   Airway: Mallampati: IV  TM distance: >3 FB   Neck ROM: limited  Mouth opening: > = 3 FB   Dental:          Pulmonary:   (+) decreased breath sounds     (-) sleep apnea and not a current smoker                           Cardiovascular:  Exercise tolerance: good (>4 METS),   (+) hypertension: moderate, murmur: Grade 1,     (-) past MI, CAD, CABG/stent, dysrhythmias and  angina    ECG reviewed  Rhythm: regular  Rate: normal           Beta Blocker:  Not on Beta Blocker      ROS comment: EKG:  Normal sinus rhythm  Anterior infarct, age undetermined  Abnormal ECG  No previous ECGs available     Neuro/Psych:   (+) psychiatric history:depression/anxiety    (-) seizures, TIA and CVA           GI/Hepatic/Renal:        (-) hepatitisMorbid obesity: BMI 37 kg/m2. ROS comment: Diverticular disease  Bowel obstruction with draining NG with underlying leukocytosis and left shift.    Endo/Other:    (+) : arthritis (Chronic back pain): OA., malignancy/cancer (Thoracic spine tumor). (-) diabetes mellitus, blood dyscrasia        Pt had no PAT visit       Abdominal:   (+) obese,     Abdomen: rigid and tender. Vascular: negative vascular ROS. - DVT and PE. Other Findings: Distended abdomen  NG with drainage            Anesthesia Plan      general     ASA 3     (Bowel obstruction with draining NG --> to be done under GA/OETT/modified RSI  Pre-oxygenation x 3 minutes  Glidescope)  Induction: intravenous. MIPS: Postoperative opioids intended and Prophylactic antiemetics administered. Anesthetic plan and risks discussed with patient. Plan discussed with CRNA.                     Fabiola Lu DO   6/8/2022

## 2022-06-08 NOTE — PROGRESS NOTES
GENERAL SURGERY  DAILY PROGRESS NOTE  6/8/2022    Subjective:  Minimal nausea overnight. 750mL from NG in past 24 hours. No flatus. Abdominal pain persists similar to yesterday. NG tube advanced at bedside    Objective:  BP (!) 141/95   Pulse 83   Temp 97 °F (36.1 °C) (Axillary)   Resp 18   Ht 5' 7\" (1.702 m)   Wt 236 lb (107 kg)   SpO2 98%   BMI 36.96 kg/m²     General Appearance:  awake, alert, oriented, uncomfortable   Skin:  Skin color, texture, turgor normal  Head/face:  NCAT, NG tube  Eyes:  No gross abnormalities. Sclera nonicteric  Lungs/Chest:  Normal expansion. No respiratory distress. Heart: Warm throughout. Regular rate  Abdomen:  Soft, mildly tender lower abdomen, moderately distended. Extremities: Extremities warm to touch, pink, with no edema. Assessment/Plan:  48 y.o. male with acute diverticulitis, likely with stricture    - KUB to assess NG tip  - NG to LIWS  - NPO  - IV fluids  - continue IV antibiotics   - pain and nausea control prn  - ambulate   - monitor abdominal exam    Electronically signed by Whitney Pearce DO on 6/8/2022 at 8:20 AM     Pt seen and examined this am; states he actually feels a little better today  Did pass some gas this am  ngt in place  No n/v    Labs/imaging reviewed  Ct with collapsed left colon/sigmoid  Pretty atypical presentation for acute diverticulitis in my opinion  He continues to be distended  Plan for flex sigmoidoscopy today  R/b/a discussed, all questions answered, informed consent on chart    BARRERA Acuna MD  Minimally Invasive General Surgery and Endoscopy  [address] [place] [address].  [address]: [phone]  F: [phone]    Electronically signed by Gregory Milligan MD on 6/8/2022 at 10:24 AM

## 2022-06-08 NOTE — OP NOTE
Operative Note      Patient: Jagjit Llanes  YOB: 1975  MRN: [id number]    Date of Procedure: 6/8/2022    Pre-Op Diagnosis: Abdominal Pain, abnormal CT    Post-Op Diagnosis: Same       Procedure(s):  COLONOSCOPY DIAGNOSTIC    Surgeon(s):  Linda Acevedo MD    Assistant:   Resident: Maryuri Barnett MD    Anesthesia: Monitor Anesthesia Care    Estimated Blood Loss (mL): Minimal    Complications: None    Specimens:   * No specimens in log *    Implants:  * No implants in log *      Drains:   NG/OG/NJ/NE Tube Nasogastric 16 fr Left nostril (Active)   Surrounding Skin Clean, dry & intact 06/07/22 1930   Securement device Tape 06/07/22 1930   Status Suction-low intermittent 06/08/22 0121   Placement Verified X-Ray (Initial) 06/07/22 1930   Drainage Appearance Green;Clear 06/08/22 0510   Free Water/Flush (mL) 30 mL 06/08/22 0510   Output (mL) 100 ml 06/08/22 0510   Residual Volume (ml) 0 ml 06/08/22 0121       Findings: very minimal air insufflation used secondary to setting, evidence of moderate/severe diverticulitis along sigmoid colon with narrowing of splenic flexure. Mucosa somewhat friable at splenic flexure and difficult to traverse secondary to apparent stricturing and perhaps early ischemic changes. No masses. Able to pass around hepatic flexure with large stool burden along ascending colon. Decompressed on withdrawal of scope. No masses/tumors/polyps encountered. Multiple photographs taken. Detailed Description of Procedure:   After appropriate anesthesia was administered and a time out was called the patient was positioned in the left side down decubitus position and a digital rectal exam was performed. There were no masses or abnormalities noted. The scope was lubricated and inserted into the anus. Scope was passed through the rectum into the sigmoid colon using water dissection with minimal air insufflation.  Sigmoid colon is noted to be moderately inflamed with many diverticula and evidence of acute inflammation. When the scope reached the proximal descending colon/splenic flexure, a large area of narrowing was encountered that took both water and air insufflation to traverse. There was no visualized mucosal mass although there was friable mucosa consistent with possibly some early ischemia at watershed area. No biopsies were taken due to the friable nature of the mucosa. The transverse colon was noted to be very distended. When we reached the hepatic flexure, the scope could no longer be advanced due to a large stool burden that was expected as the colon was not prepped for this procedure. During withdrawal, air was continuously suctioned out of the colon. Again, no masses/tumors/polyps were identified upon withdrawal. The scope was completely removed. The patient tolerated the procedure well. Dr. Marie Goldstein was present for entire procedure.       Electronically signed by Carmen Peres MD on 6/8/2022 at 12:50 PM

## 2022-06-09 LAB
ANION GAP SERPL CALCULATED.3IONS-SCNC: 10 MMOL/L (ref 7–16)
BASOPHILS ABSOLUTE: 0.03 E9/L (ref 0–0.2)
BASOPHILS RELATIVE PERCENT: 0.2 % (ref 0–2)
BUN BLDV-MCNC: 28 MG/DL (ref 6–20)
CALCIUM SERPL-MCNC: 8.6 MG/DL (ref 8.6–10.2)
CHLORIDE BLD-SCNC: 107 MMOL/L (ref 98–107)
CO2: 27 MMOL/L (ref 22–29)
CREAT SERPL-MCNC: 1.2 MG/DL (ref 0.7–1.2)
EOSINOPHILS ABSOLUTE: 0.01 E9/L (ref 0.05–0.5)
EOSINOPHILS RELATIVE PERCENT: 0.1 % (ref 0–6)
GFR AFRICAN AMERICAN: >60
GFR NON-AFRICAN AMERICAN: >60 ML/MIN/1.73
GLUCOSE BLD-MCNC: 139 MG/DL (ref 74–99)
HCT VFR BLD CALC: 36 % (ref 37–54)
HEMOGLOBIN: 11.3 G/DL (ref 12.5–16.5)
IMMATURE GRANULOCYTES #: 0.27 E9/L
IMMATURE GRANULOCYTES %: 1.8 % (ref 0–5)
LYMPHOCYTES ABSOLUTE: 1.04 E9/L (ref 1.5–4)
LYMPHOCYTES RELATIVE PERCENT: 7 % (ref 20–42)
MAGNESIUM: 2.5 MG/DL (ref 1.6–2.6)
MCH RBC QN AUTO: 27.5 PG (ref 26–35)
MCHC RBC AUTO-ENTMCNC: 31.4 % (ref 32–34.5)
MCV RBC AUTO: 87.6 FL (ref 80–99.9)
MONOCYTES ABSOLUTE: 0.69 E9/L (ref 0.1–0.95)
MONOCYTES RELATIVE PERCENT: 4.7 % (ref 2–12)
NEUTROPHILS ABSOLUTE: 12.79 E9/L (ref 1.8–7.3)
NEUTROPHILS RELATIVE PERCENT: 86.2 % (ref 43–80)
PDW BLD-RTO: 15.2 FL (ref 11.5–15)
PHOSPHORUS: 3.8 MG/DL (ref 2.5–4.5)
PLATELET # BLD: 393 E9/L (ref 130–450)
PMV BLD AUTO: 9.8 FL (ref 7–12)
POTASSIUM SERPL-SCNC: 4.1 MMOL/L (ref 3.5–5)
RBC # BLD: 4.11 E12/L (ref 3.8–5.8)
SODIUM BLD-SCNC: 144 MMOL/L (ref 132–146)
WBC # BLD: 14.8 E9/L (ref 4.5–11.5)

## 2022-06-09 PROCEDURE — 85025 COMPLETE CBC W/AUTO DIFF WBC: CPT

## 2022-06-09 PROCEDURE — 1200000000 HC SEMI PRIVATE

## 2022-06-09 PROCEDURE — 83735 ASSAY OF MAGNESIUM: CPT

## 2022-06-09 PROCEDURE — 6360000002 HC RX W HCPCS: Performed by: STUDENT IN AN ORGANIZED HEALTH CARE EDUCATION/TRAINING PROGRAM

## 2022-06-09 PROCEDURE — 84100 ASSAY OF PHOSPHORUS: CPT

## 2022-06-09 PROCEDURE — 2580000003 HC RX 258: Performed by: STUDENT IN AN ORGANIZED HEALTH CARE EDUCATION/TRAINING PROGRAM

## 2022-06-09 PROCEDURE — 2700000000 HC OXYGEN THERAPY PER DAY

## 2022-06-09 PROCEDURE — 36415 COLL VENOUS BLD VENIPUNCTURE: CPT

## 2022-06-09 PROCEDURE — 6370000000 HC RX 637 (ALT 250 FOR IP): Performed by: STUDENT IN AN ORGANIZED HEALTH CARE EDUCATION/TRAINING PROGRAM

## 2022-06-09 PROCEDURE — 80048 BASIC METABOLIC PNL TOTAL CA: CPT

## 2022-06-09 PROCEDURE — 2580000003 HC RX 258: Performed by: ANESTHESIOLOGY

## 2022-06-09 RX ADMIN — KETOROLAC TROMETHAMINE 15 MG: 30 INJECTION, SOLUTION INTRAMUSCULAR; INTRAVENOUS at 18:44

## 2022-06-09 RX ADMIN — PIPERACILLIN SODIUM AND TAZOBACTAM SODIUM 4500 MG: 4; .5 INJECTION, POWDER, LYOPHILIZED, FOR SOLUTION INTRAVENOUS at 05:15

## 2022-06-09 RX ADMIN — PIPERACILLIN SODIUM AND TAZOBACTAM SODIUM 4500 MG: 4; .5 INJECTION, POWDER, LYOPHILIZED, FOR SOLUTION INTRAVENOUS at 21:24

## 2022-06-09 RX ADMIN — ACETAMINOPHEN 1000 MG: 500 TABLET ORAL at 14:28

## 2022-06-09 RX ADMIN — SODIUM CHLORIDE, POTASSIUM CHLORIDE, SODIUM LACTATE AND CALCIUM CHLORIDE: 600; 310; 30; 20 INJECTION, SOLUTION INTRAVENOUS at 01:24

## 2022-06-09 RX ADMIN — ENOXAPARIN SODIUM 30 MG: 100 INJECTION SUBCUTANEOUS at 21:24

## 2022-06-09 RX ADMIN — SODIUM CHLORIDE, PRESERVATIVE FREE 10 ML: 5 INJECTION INTRAVENOUS at 10:21

## 2022-06-09 RX ADMIN — ENOXAPARIN SODIUM 30 MG: 100 INJECTION SUBCUTANEOUS at 10:18

## 2022-06-09 RX ADMIN — KETOROLAC TROMETHAMINE 15 MG: 30 INJECTION, SOLUTION INTRAMUSCULAR; INTRAVENOUS at 00:24

## 2022-06-09 RX ADMIN — METHOCARBAMOL 500 MG: 100 INJECTION, SOLUTION INTRAMUSCULAR; INTRAVENOUS at 10:19

## 2022-06-09 RX ADMIN — SODIUM CHLORIDE, PRESERVATIVE FREE 10 ML: 5 INJECTION INTRAVENOUS at 18:44

## 2022-06-09 RX ADMIN — PIPERACILLIN SODIUM AND TAZOBACTAM SODIUM 4500 MG: 4; .5 INJECTION, POWDER, LYOPHILIZED, FOR SOLUTION INTRAVENOUS at 12:43

## 2022-06-09 RX ADMIN — KETOROLAC TROMETHAMINE 15 MG: 30 INJECTION, SOLUTION INTRAMUSCULAR; INTRAVENOUS at 06:37

## 2022-06-09 RX ADMIN — KETOROLAC TROMETHAMINE 15 MG: 30 INJECTION, SOLUTION INTRAMUSCULAR; INTRAVENOUS at 12:43

## 2022-06-09 RX ADMIN — METHOCARBAMOL 500 MG: 100 INJECTION, SOLUTION INTRAMUSCULAR; INTRAVENOUS at 18:41

## 2022-06-09 RX ADMIN — SODIUM CHLORIDE, PRESERVATIVE FREE 10 ML: 5 INJECTION INTRAVENOUS at 21:24

## 2022-06-09 RX ADMIN — METHOCARBAMOL 500 MG: 100 INJECTION, SOLUTION INTRAMUSCULAR; INTRAVENOUS at 01:22

## 2022-06-09 ASSESSMENT — PAIN DESCRIPTION - LOCATION
LOCATION: ABDOMEN
LOCATION: ABDOMEN

## 2022-06-09 ASSESSMENT — PAIN DESCRIPTION - DESCRIPTORS: DESCRIPTORS: DISCOMFORT;ACHING

## 2022-06-09 ASSESSMENT — PAIN SCALES - GENERAL
PAINLEVEL_OUTOF10: 6
PAINLEVEL_OUTOF10: 4
PAINLEVEL_OUTOF10: 1
PAINLEVEL_OUTOF10: 0

## 2022-06-09 ASSESSMENT — PAIN DESCRIPTION - PAIN TYPE: TYPE: ACUTE PAIN

## 2022-06-09 ASSESSMENT — PAIN DESCRIPTION - ONSET: ONSET: GRADUAL

## 2022-06-09 ASSESSMENT — PAIN DESCRIPTION - ORIENTATION: ORIENTATION: LOWER;UPPER;MID

## 2022-06-09 NOTE — PLAN OF CARE
Problem: Pain  Goal: Verbalizes/displays adequate comfort level or baseline comfort level  6/8/2022 2230 by Renea Holcomb RN  Outcome: Progressing  6/8/2022 1829 by Felix Drake RN  Outcome: Progressing  Flowsheets  Taken 6/8/2022 1330 by Lisandra Oliveira RN  Verbalizes/displays adequate comfort level or baseline comfort level: Administer analgesics based on type and severity of pain and evaluate response  Taken 6/8/2022 1315 by Lisandra Oliveira RN  Verbalizes/displays adequate comfort level or baseline comfort level: Administer analgesics based on type and severity of pain and evaluate response  Taken 6/8/2022 1254 by Lisandra Oliveira RN  Verbalizes/displays adequate comfort level or baseline comfort level: Administer analgesics based on type and severity of pain and evaluate response     Problem: Safety - Adult  Goal: Free from fall injury  Outcome: Progressing     Problem: Anxiety  Goal: Will report anxiety at manageable levels  Description: INTERVENTIONS:  1. Administer medication as ordered  2. Teach and rehearse alternative coping skills  3.  Provide emotional support with 1:1 interaction with staff  Outcome: Progressing     Problem: ABCDS Injury Assessment  Goal: Absence of physical injury  Outcome: Progressing     Problem: Discharge Planning  Goal: Discharge to home or other facility with appropriate resources  Outcome: Progressing

## 2022-06-09 NOTE — CARE COORDINATION
Social work / Discharge Planning:       Patient remains on IV Zosyn. Surgery is following. NGT in place. He is currently NPO. If IV antibiotics are needed for discharge, patient prefers to use the [facility] infusion center. Social work continues to follow to assist with discharge planning.    Electronically signed by GINO Marquez on 6/9/2022 at 12:48 PM

## 2022-06-09 NOTE — PROGRESS NOTES
GENERAL SURGERY  DAILY PROGRESS NOTE  6/9/2022    Subjective:  States he feels better this am  Passing gas  ngt w bilious output  No n/v  Pain improving  Tolerated dx scope yesterday  Wants to get up and walk  Vss/af      Objective:  /79   Pulse 72   Temp 97.8 °F (36.6 °C) (Oral)   Resp 18   Ht 5' 7\" (1.702 m)   Wt 259 lb (117.5 kg)   SpO2 98%   BMI 40.57 kg/m²     General Appearance:  awake, alert, oriented, uncomfortable   Skin:  Skin color, texture, turgor normal  Head/face:  NCAT, NG tube  Eyes:  No gross abnormalities. Sclera nonicteric  Lungs/Chest:  Normal expansion. No respiratory distress. Heart: Warm throughout. Regular rate  Abdomen:  Softer today, no r/g/r, distention improving. Extremities: Extremities warm to touch, pink, with no edema. Assessment/Plan:  48 y.o. male with acute diverticulitis, likely stricture at splenic flexure    - atypical presentation of solely acute diverticulitis with this free fluid and apparent stricture at splenic flexure  - continue w conservative mgmt at this time w ngt and iv abx  - NG to LIWS  - NPO for now  - IV fluids  - pain and nausea control prn  - OOB and ambulate  - monitor abdominal exam    Dez Chung MD  Minimally Invasive General Surgery and Endoscopy  [address] [place] [address].  [address]: [phone]  F: [phone]    Electronically signed by Neena Carrillo MD on 6/9/2022 at 10:09 AM

## 2022-06-10 LAB
ANION GAP SERPL CALCULATED.3IONS-SCNC: 11 MMOL/L (ref 7–16)
BASOPHILS ABSOLUTE: 0.01 E9/L (ref 0–0.2)
BASOPHILS RELATIVE PERCENT: 0.1 % (ref 0–2)
BUN BLDV-MCNC: 36 MG/DL (ref 6–20)
CALCIUM SERPL-MCNC: 8.5 MG/DL (ref 8.6–10.2)
CHLORIDE BLD-SCNC: 105 MMOL/L (ref 98–107)
CO2: 25 MMOL/L (ref 22–29)
CREAT SERPL-MCNC: 1.1 MG/DL (ref 0.7–1.2)
EOSINOPHILS ABSOLUTE: 0.05 E9/L (ref 0.05–0.5)
EOSINOPHILS RELATIVE PERCENT: 0.4 % (ref 0–6)
GFR AFRICAN AMERICAN: >60
GFR NON-AFRICAN AMERICAN: >60 ML/MIN/1.73
GLUCOSE BLD-MCNC: 115 MG/DL (ref 74–99)
HCT VFR BLD CALC: 34.8 % (ref 37–54)
HEMOGLOBIN: 11.2 G/DL (ref 12.5–16.5)
IMMATURE GRANULOCYTES #: 0.27 E9/L
IMMATURE GRANULOCYTES %: 1.9 % (ref 0–5)
LYMPHOCYTES ABSOLUTE: 1.92 E9/L (ref 1.5–4)
LYMPHOCYTES RELATIVE PERCENT: 13.7 % (ref 20–42)
MAGNESIUM: 2.4 MG/DL (ref 1.6–2.6)
MCH RBC QN AUTO: 27.7 PG (ref 26–35)
MCHC RBC AUTO-ENTMCNC: 32.2 % (ref 32–34.5)
MCV RBC AUTO: 85.9 FL (ref 80–99.9)
MONOCYTES ABSOLUTE: 0.9 E9/L (ref 0.1–0.95)
MONOCYTES RELATIVE PERCENT: 6.4 % (ref 2–12)
NEUTROPHILS ABSOLUTE: 10.87 E9/L (ref 1.8–7.3)
NEUTROPHILS RELATIVE PERCENT: 77.5 % (ref 43–80)
PDW BLD-RTO: 15.2 FL (ref 11.5–15)
PHOSPHORUS: 3.5 MG/DL (ref 2.5–4.5)
PLATELET # BLD: 395 E9/L (ref 130–450)
PMV BLD AUTO: 9.8 FL (ref 7–12)
POTASSIUM SERPL-SCNC: 3.9 MMOL/L (ref 3.5–5)
RBC # BLD: 4.05 E12/L (ref 3.8–5.8)
SODIUM BLD-SCNC: 141 MMOL/L (ref 132–146)
WBC # BLD: 14 E9/L (ref 4.5–11.5)

## 2022-06-10 PROCEDURE — 6360000002 HC RX W HCPCS: Performed by: STUDENT IN AN ORGANIZED HEALTH CARE EDUCATION/TRAINING PROGRAM

## 2022-06-10 PROCEDURE — 6370000000 HC RX 637 (ALT 250 FOR IP): Performed by: STUDENT IN AN ORGANIZED HEALTH CARE EDUCATION/TRAINING PROGRAM

## 2022-06-10 PROCEDURE — 84100 ASSAY OF PHOSPHORUS: CPT

## 2022-06-10 PROCEDURE — 85025 COMPLETE CBC W/AUTO DIFF WBC: CPT

## 2022-06-10 PROCEDURE — 1200000000 HC SEMI PRIVATE

## 2022-06-10 PROCEDURE — 2580000003 HC RX 258: Performed by: STUDENT IN AN ORGANIZED HEALTH CARE EDUCATION/TRAINING PROGRAM

## 2022-06-10 PROCEDURE — 80048 BASIC METABOLIC PNL TOTAL CA: CPT

## 2022-06-10 PROCEDURE — 36415 COLL VENOUS BLD VENIPUNCTURE: CPT

## 2022-06-10 PROCEDURE — 2580000003 HC RX 258: Performed by: ANESTHESIOLOGY

## 2022-06-10 PROCEDURE — 83735 ASSAY OF MAGNESIUM: CPT

## 2022-06-10 RX ORDER — METRONIDAZOLE 500 MG/1
500 TABLET ORAL EVERY 8 HOURS SCHEDULED
Status: DISCONTINUED | OUTPATIENT
Start: 2022-06-10 | End: 2022-06-12

## 2022-06-10 RX ORDER — CEFDINIR 300 MG/1
300 CAPSULE ORAL EVERY 12 HOURS SCHEDULED
Status: DISCONTINUED | OUTPATIENT
Start: 2022-06-10 | End: 2022-06-12

## 2022-06-10 RX ADMIN — ENOXAPARIN SODIUM 30 MG: 100 INJECTION SUBCUTANEOUS at 20:26

## 2022-06-10 RX ADMIN — SODIUM CHLORIDE, POTASSIUM CHLORIDE, SODIUM LACTATE AND CALCIUM CHLORIDE: 600; 310; 30; 20 INJECTION, SOLUTION INTRAVENOUS at 12:57

## 2022-06-10 RX ADMIN — SODIUM CHLORIDE, POTASSIUM CHLORIDE, SODIUM LACTATE AND CALCIUM CHLORIDE: 600; 310; 30; 20 INJECTION, SOLUTION INTRAVENOUS at 02:20

## 2022-06-10 RX ADMIN — KETOROLAC TROMETHAMINE 15 MG: 30 INJECTION, SOLUTION INTRAMUSCULAR; INTRAVENOUS at 00:15

## 2022-06-10 RX ADMIN — PIPERACILLIN SODIUM AND TAZOBACTAM SODIUM 4500 MG: 4; .5 INJECTION, POWDER, LYOPHILIZED, FOR SOLUTION INTRAVENOUS at 06:00

## 2022-06-10 RX ADMIN — METRONIDAZOLE 500 MG: 500 TABLET ORAL at 17:08

## 2022-06-10 RX ADMIN — KETOROLAC TROMETHAMINE 15 MG: 30 INJECTION, SOLUTION INTRAMUSCULAR; INTRAVENOUS at 12:50

## 2022-06-10 RX ADMIN — METRONIDAZOLE 500 MG: 500 TABLET ORAL at 08:26

## 2022-06-10 RX ADMIN — KETOROLAC TROMETHAMINE 15 MG: 30 INJECTION, SOLUTION INTRAMUSCULAR; INTRAVENOUS at 23:52

## 2022-06-10 RX ADMIN — ACETAMINOPHEN 1000 MG: 500 TABLET ORAL at 23:52

## 2022-06-10 RX ADMIN — KETOROLAC TROMETHAMINE 15 MG: 30 INJECTION, SOLUTION INTRAMUSCULAR; INTRAVENOUS at 18:56

## 2022-06-10 RX ADMIN — METHOCARBAMOL 500 MG: 100 INJECTION, SOLUTION INTRAMUSCULAR; INTRAVENOUS at 00:31

## 2022-06-10 RX ADMIN — ACETAMINOPHEN 1000 MG: 500 TABLET ORAL at 08:27

## 2022-06-10 RX ADMIN — ACETAMINOPHEN 1000 MG: 500 TABLET ORAL at 00:15

## 2022-06-10 RX ADMIN — ACETAMINOPHEN 1000 MG: 500 TABLET ORAL at 17:08

## 2022-06-10 RX ADMIN — ENOXAPARIN SODIUM 30 MG: 100 INJECTION SUBCUTANEOUS at 08:28

## 2022-06-10 RX ADMIN — CEFDINIR 300 MG: 300 CAPSULE ORAL at 20:26

## 2022-06-10 RX ADMIN — METHOCARBAMOL 500 MG: 100 INJECTION, SOLUTION INTRAMUSCULAR; INTRAVENOUS at 11:39

## 2022-06-10 RX ADMIN — SODIUM CHLORIDE, PRESERVATIVE FREE 10 ML: 5 INJECTION INTRAVENOUS at 20:26

## 2022-06-10 RX ADMIN — CEFDINIR 300 MG: 300 CAPSULE ORAL at 08:30

## 2022-06-10 RX ADMIN — METRONIDAZOLE 500 MG: 500 TABLET ORAL at 23:52

## 2022-06-10 RX ADMIN — METHOCARBAMOL 500 MG: 100 INJECTION, SOLUTION INTRAMUSCULAR; INTRAVENOUS at 20:24

## 2022-06-10 RX ADMIN — KETOROLAC TROMETHAMINE 15 MG: 30 INJECTION, SOLUTION INTRAMUSCULAR; INTRAVENOUS at 06:03

## 2022-06-10 ASSESSMENT — PAIN DESCRIPTION - LOCATION
LOCATION: ABDOMEN

## 2022-06-10 ASSESSMENT — PAIN SCALES - GENERAL
PAINLEVEL_OUTOF10: 3
PAINLEVEL_OUTOF10: 2
PAINLEVEL_OUTOF10: 3
PAINLEVEL_OUTOF10: 3
PAINLEVEL_OUTOF10: 1
PAINLEVEL_OUTOF10: 1
PAINLEVEL_OUTOF10: 3
PAINLEVEL_OUTOF10: 1
PAINLEVEL_OUTOF10: 2

## 2022-06-10 ASSESSMENT — PAIN DESCRIPTION - ONSET: ONSET: ON-GOING

## 2022-06-10 ASSESSMENT — PAIN DESCRIPTION - DESCRIPTORS
DESCRIPTORS: DISCOMFORT
DESCRIPTORS: ACHING
DESCRIPTORS: ACHING;DISCOMFORT
DESCRIPTORS: ACHING

## 2022-06-10 ASSESSMENT — PAIN DESCRIPTION - ORIENTATION
ORIENTATION: UPPER
ORIENTATION: LEFT;RIGHT;MID

## 2022-06-10 ASSESSMENT — PAIN - FUNCTIONAL ASSESSMENT
PAIN_FUNCTIONAL_ASSESSMENT: ACTIVITIES ARE NOT PREVENTED

## 2022-06-10 ASSESSMENT — PAIN DESCRIPTION - PAIN TYPE: TYPE: ACUTE PAIN

## 2022-06-10 ASSESSMENT — PAIN DESCRIPTION - FREQUENCY: FREQUENCY: INTERMITTENT

## 2022-06-10 NOTE — PLAN OF CARE
Problem: Pain  Goal: Verbalizes/displays adequate comfort level or baseline comfort level  Outcome: Progressing     Problem: Safety - Adult  Goal: Free from fall injury  Outcome: Progressing     Problem: Anxiety  Goal: Will report anxiety at manageable levels  Description: INTERVENTIONS:  1. Administer medication as ordered  2. Teach and rehearse alternative coping skills  3.  Provide emotional support with 1:1 interaction with staff  Outcome: Progressing     Problem: ABCDS Injury Assessment  Goal: Absence of physical injury  Outcome: Progressing     Problem: Discharge Planning  Goal: Discharge to home or other facility with appropriate resources  Outcome: Progressing

## 2022-06-10 NOTE — PLAN OF CARE
Problem: Pain  Goal: Verbalizes/displays adequate comfort level or baseline comfort level  Outcome: Progressing     Problem: Safety - Adult  Goal: Free from fall injury  Outcome: Progressing     Problem: Anxiety  Goal: Will report anxiety at manageable levels  Description: INTERVENTIONS:  1. Administer medication as ordered  2. Teach and rehearse alternative coping skills  3.  Provide emotional support with 1:1 interaction with staff  Outcome: Progressing

## 2022-06-10 NOTE — PROGRESS NOTES
Comprehensive Nutrition Assessment    Type and Reason for Visit:  RD Nutrition Re-Screen/LOS    Nutrition Recommendations/Plan:   1. Continue current diet, ADAT and monitor for nutrition progression     Nutrition Assessment:    Admitted for acute diverticultis s/p c-scope 6/8. NGT in place, clamped. Possible removal today. Stated #. Denies any weight loss, chewing/swallowing difficulties. Currently on CLD, ADAT and will monitor for nutrition progression. Nutrition Related Findings:    A&O X4, +9.1 L, abd distended, constipation, +BS, NG tube clamped. Wound Type: None     Current Nutrition Intake & Therapies:    Average Meal Intake: 51-75% (per observation of breakfast tray)  Average Supplements Intake: None Ordered  ADULT DIET; Clear Liquid; No Carbonated Beverages    Anthropometric Measures:  Height: 5' 7\" (170.2 cm)  Ideal Body Weight (IBW): 148 lbs (67 kg)    Admission Body Weight: 243 lb (110.2 kg) (6/6 bed)  Current Body Weight: 263 lb (119.3 kg) (6/10 bed, elevated (+I&Os)), 177.7 % IBW.  Weight Source: Bed Scale  Current BMI (kg/m2): 41.2  Usual Body Weight: 235 lb (106.6 kg) (per patient report)  % Weight Change (Calculated): 11.9  Weight Adjustment For: No Adjustment                 BMI Categories: Obese Class 3 (BMI 40.0 or greater)    Estimated Daily Nutrient Needs:  Energy Requirements Based On: Kcal/kg  Weight Used for Energy Requirements: Admission  Energy (kcal/day):   Weight Used for Protein Requirements: Ideal  Protein (g/day):  (1.3-1.5)  Method Used for Fluid Requirements: 1 ml/kcal  Fluid (ml/day):     Nutrition Diagnosis:   · Inadequate oral intake related to altered GI function as evidenced by NPO or clear liquid status due to medical condition,GI abnormality    Nutrition Interventions:   Food and/or Nutrient Delivery: Continue Current Diet (ADAT)  Nutrition Education/Counseling: Education initiated (discussed low fiber diet, appetite w/ pt)  Coordination of Nutrition Care: Continue to monitor while inpatient       Goals:     Goals: other (specify)  Specify Other Goals: nutrition progression    Nutrition Monitoring and Evaluation:      Food/Nutrient Intake Outcomes: Diet Advancement/Tolerance,Food and Nutrient Intake  Physical Signs/Symptoms Outcomes: Biochemical Data,GI Status,Fluid Status or Edema,Nutrition Focused Physical Findings,Skin,Weight    Discharge Planning:     Too soon to determine     Lance Trevizo RD, LD  Contact: [phone]

## 2022-06-10 NOTE — CARE COORDINATION
Social Work / Discharge Planning : Plan to change patient to Oral antibiotics and clamp NG tube. Plan at discharge will be home. Anticipate no needs. Per surgery:  PO abx cld  Poss remove ngt later today. SW to follow.  Electronically signed by Lorita Halsted, LSW on 6/10/22 at 10:25 AM EDT

## 2022-06-10 NOTE — PROGRESS NOTES
GENERAL SURGERY  DAILY PROGRESS NOTE  6/10/2022    Subjective:  Continuing to feel better. Passing flatus. Still no BM. NG clamped overnight. 100mL out of NG when re-connected to suction this morning. Minimal nausea. Objective:  /83   Pulse 60   Temp 97.5 °F (36.4 °C) (Oral)   Resp 18   Ht 5' 7\" (1.702 m)   Wt 263 lb (119.3 kg)   SpO2 98%   BMI 41.19 kg/m²     General Appearance:  awake, alert, oriented  Skin:  Skin color, texture, turgor normal  Head/face:  NCAT, NG tube clamped  Eyes:  No gross abnormalities. Sclera nonicteric  Lungs/Chest:  Normal expansion. No respiratory distress. Heart: Warm throughout. Regular rate  Abdomen:  Softer today, no r/g/r, distention improving, no tenderness. Extremities: Extremities warm to touch, pink, with no edema. Assessment/Plan:  48 y.o. male with acute diverticulitis, likely stricture at splenic flexure. Atypical presentation of solely acute diverticulitis with this free fluid and apparent stricture at splenic flexure    - antibiotics  - clamp NG  - trial CLD  - IV fluids  - pain and nausea control prn  - OOB and ambulate  - monitor abdominal exam    Electronically signed by eKri Valente DO on 6/10/2022 at 6:25 AM       Pt seen and examined; agree w above  PO abx  cld  Poss remove ngt later today    Lafayette Cogan MD  Minimally Invasive General Surgery and Endoscopy  [address] [place] [address].  [address]: [phone]  F: [phone]    Electronically signed by Felipe Todd MD on 6/10/2022 at 7:06 AM

## 2022-06-11 LAB
ANION GAP SERPL CALCULATED.3IONS-SCNC: 10 MMOL/L (ref 7–16)
ATYPICAL LYMPHOCYTE RELATIVE PERCENT: 1.7 % (ref 0–4)
BASOPHILS ABSOLUTE: 0 E9/L (ref 0–0.2)
BASOPHILS RELATIVE PERCENT: 0 % (ref 0–2)
BUN BLDV-MCNC: 23 MG/DL (ref 6–20)
CALCIUM SERPL-MCNC: 8 MG/DL (ref 8.6–10.2)
CHLORIDE BLD-SCNC: 105 MMOL/L (ref 98–107)
CO2: 25 MMOL/L (ref 22–29)
CREAT SERPL-MCNC: 0.9 MG/DL (ref 0.7–1.2)
EOSINOPHILS ABSOLUTE: 0.39 E9/L (ref 0.05–0.5)
EOSINOPHILS RELATIVE PERCENT: 3.5 % (ref 0–6)
GFR AFRICAN AMERICAN: >60
GFR NON-AFRICAN AMERICAN: >60 ML/MIN/1.73
GLUCOSE BLD-MCNC: 99 MG/DL (ref 74–99)
HCT VFR BLD CALC: 35.2 % (ref 37–54)
HEMOGLOBIN: 11.1 G/DL (ref 12.5–16.5)
LYMPHOCYTES ABSOLUTE: 1.68 E9/L (ref 1.5–4)
LYMPHOCYTES RELATIVE PERCENT: 13 % (ref 20–42)
MAGNESIUM: 2 MG/DL (ref 1.6–2.6)
MCH RBC QN AUTO: 27.5 PG (ref 26–35)
MCHC RBC AUTO-ENTMCNC: 31.5 % (ref 32–34.5)
MCV RBC AUTO: 87.3 FL (ref 80–99.9)
METAMYELOCYTES RELATIVE PERCENT: 0.9 % (ref 0–1)
MONOCYTES ABSOLUTE: 1.12 E9/L (ref 0.1–0.95)
MONOCYTES RELATIVE PERCENT: 9.6 % (ref 2–12)
MYELOCYTE PERCENT: 1.7 % (ref 0–0)
NEUTROPHILS ABSOLUTE: 8.06 E9/L (ref 1.8–7.3)
NEUTROPHILS RELATIVE PERCENT: 69.6 % (ref 43–80)
NUCLEATED RED BLOOD CELLS: 0 /100 WBC
PDW BLD-RTO: 15.4 FL (ref 11.5–15)
PHOSPHORUS: 3.3 MG/DL (ref 2.5–4.5)
PLATELET # BLD: 351 E9/L (ref 130–450)
PMV BLD AUTO: 10 FL (ref 7–12)
POTASSIUM SERPL-SCNC: 3.3 MMOL/L (ref 3.5–5)
RBC # BLD: 4.03 E12/L (ref 3.8–5.8)
RBC # BLD: NORMAL 10*6/UL
SODIUM BLD-SCNC: 140 MMOL/L (ref 132–146)
WBC # BLD: 11.2 E9/L (ref 4.5–11.5)

## 2022-06-11 PROCEDURE — 2580000003 HC RX 258: Performed by: STUDENT IN AN ORGANIZED HEALTH CARE EDUCATION/TRAINING PROGRAM

## 2022-06-11 PROCEDURE — 1200000000 HC SEMI PRIVATE

## 2022-06-11 PROCEDURE — 83735 ASSAY OF MAGNESIUM: CPT

## 2022-06-11 PROCEDURE — 6370000000 HC RX 637 (ALT 250 FOR IP): Performed by: STUDENT IN AN ORGANIZED HEALTH CARE EDUCATION/TRAINING PROGRAM

## 2022-06-11 PROCEDURE — 84100 ASSAY OF PHOSPHORUS: CPT

## 2022-06-11 PROCEDURE — 2500000003 HC RX 250 WO HCPCS: Performed by: SURGERY

## 2022-06-11 PROCEDURE — 6360000002 HC RX W HCPCS: Performed by: STUDENT IN AN ORGANIZED HEALTH CARE EDUCATION/TRAINING PROGRAM

## 2022-06-11 PROCEDURE — 6360000002 HC RX W HCPCS: Performed by: SURGERY

## 2022-06-11 PROCEDURE — 2580000003 HC RX 258: Performed by: ANESTHESIOLOGY

## 2022-06-11 PROCEDURE — 36415 COLL VENOUS BLD VENIPUNCTURE: CPT

## 2022-06-11 PROCEDURE — 80048 BASIC METABOLIC PNL TOTAL CA: CPT

## 2022-06-11 PROCEDURE — 85025 COMPLETE CBC W/AUTO DIFF WBC: CPT

## 2022-06-11 RX ORDER — DEXTROSE, SODIUM CHLORIDE, AND POTASSIUM CHLORIDE 5; .45; .22 G/100ML; G/100ML; G/100ML
INJECTION INTRAVENOUS CONTINUOUS
Status: DISCONTINUED | OUTPATIENT
Start: 2022-06-11 | End: 2022-06-13

## 2022-06-11 RX ORDER — POTASSIUM CHLORIDE 7.45 MG/ML
10 INJECTION INTRAVENOUS
Status: COMPLETED | OUTPATIENT
Start: 2022-06-11 | End: 2022-06-11

## 2022-06-11 RX ADMIN — ACETAMINOPHEN 1000 MG: 500 TABLET ORAL at 23:40

## 2022-06-11 RX ADMIN — SODIUM CHLORIDE, POTASSIUM CHLORIDE, SODIUM LACTATE AND CALCIUM CHLORIDE: 600; 310; 30; 20 INJECTION, SOLUTION INTRAVENOUS at 03:28

## 2022-06-11 RX ADMIN — METHOCARBAMOL 500 MG: 100 INJECTION, SOLUTION INTRAMUSCULAR; INTRAVENOUS at 18:58

## 2022-06-11 RX ADMIN — ENOXAPARIN SODIUM 30 MG: 100 INJECTION SUBCUTANEOUS at 22:17

## 2022-06-11 RX ADMIN — METHOCARBAMOL 500 MG: 100 INJECTION, SOLUTION INTRAMUSCULAR; INTRAVENOUS at 03:27

## 2022-06-11 RX ADMIN — POTASSIUM CHLORIDE 10 MEQ: 7.45 INJECTION INTRAVENOUS at 20:37

## 2022-06-11 RX ADMIN — KETOROLAC TROMETHAMINE 15 MG: 30 INJECTION, SOLUTION INTRAMUSCULAR; INTRAVENOUS at 18:18

## 2022-06-11 RX ADMIN — POTASSIUM CHLORIDE 10 MEQ: 7.45 INJECTION INTRAVENOUS at 17:07

## 2022-06-11 RX ADMIN — METHOCARBAMOL 500 MG: 100 INJECTION, SOLUTION INTRAMUSCULAR; INTRAVENOUS at 11:18

## 2022-06-11 RX ADMIN — POTASSIUM CHLORIDE 10 MEQ: 7.45 INJECTION INTRAVENOUS at 15:31

## 2022-06-11 RX ADMIN — METRONIDAZOLE 500 MG: 500 TABLET ORAL at 15:30

## 2022-06-11 RX ADMIN — ACETAMINOPHEN 1000 MG: 500 TABLET ORAL at 08:57

## 2022-06-11 RX ADMIN — CEFDINIR 300 MG: 300 CAPSULE ORAL at 08:57

## 2022-06-11 RX ADMIN — KETOROLAC TROMETHAMINE 15 MG: 30 INJECTION, SOLUTION INTRAMUSCULAR; INTRAVENOUS at 06:01

## 2022-06-11 RX ADMIN — SODIUM CHLORIDE, PRESERVATIVE FREE 10 ML: 5 INJECTION INTRAVENOUS at 08:58

## 2022-06-11 RX ADMIN — SODIUM CHLORIDE, PRESERVATIVE FREE 10 ML: 5 INJECTION INTRAVENOUS at 22:17

## 2022-06-11 RX ADMIN — KETOROLAC TROMETHAMINE 15 MG: 30 INJECTION, SOLUTION INTRAMUSCULAR; INTRAVENOUS at 12:33

## 2022-06-11 RX ADMIN — POTASSIUM CHLORIDE 10 MEQ: 7.45 INJECTION INTRAVENOUS at 18:12

## 2022-06-11 RX ADMIN — METRONIDAZOLE 500 MG: 500 TABLET ORAL at 06:01

## 2022-06-11 RX ADMIN — ENOXAPARIN SODIUM 30 MG: 100 INJECTION SUBCUTANEOUS at 08:56

## 2022-06-11 RX ADMIN — CEFDINIR 300 MG: 300 CAPSULE ORAL at 22:17

## 2022-06-11 RX ADMIN — POTASSIUM CHLORIDE, DEXTROSE MONOHYDRATE AND SODIUM CHLORIDE: 224; 5; 450 INJECTION, SOLUTION INTRAVENOUS at 17:14

## 2022-06-11 RX ADMIN — METRONIDAZOLE 500 MG: 500 TABLET ORAL at 22:21

## 2022-06-11 RX ADMIN — ACETAMINOPHEN 1000 MG: 500 TABLET ORAL at 15:30

## 2022-06-11 RX ADMIN — SODIUM CHLORIDE, PRESERVATIVE FREE 10 ML: 5 INJECTION INTRAVENOUS at 06:01

## 2022-06-11 ASSESSMENT — PAIN SCALES - GENERAL
PAINLEVEL_OUTOF10: 2
PAINLEVEL_OUTOF10: 3
PAINLEVEL_OUTOF10: 3
PAINLEVEL_OUTOF10: 2

## 2022-06-11 ASSESSMENT — PAIN DESCRIPTION - FREQUENCY: FREQUENCY: CONTINUOUS

## 2022-06-11 ASSESSMENT — PAIN DESCRIPTION - ONSET: ONSET: PROGRESSIVE

## 2022-06-11 ASSESSMENT — PAIN - FUNCTIONAL ASSESSMENT: PAIN_FUNCTIONAL_ASSESSMENT: ACTIVITIES ARE NOT PREVENTED

## 2022-06-11 ASSESSMENT — PAIN DESCRIPTION - PAIN TYPE: TYPE: ACUTE PAIN

## 2022-06-11 ASSESSMENT — PAIN DESCRIPTION - LOCATION: LOCATION: ABDOMEN

## 2022-06-11 ASSESSMENT — PAIN DESCRIPTION - DESCRIPTORS: DESCRIPTORS: ACHING

## 2022-06-11 NOTE — PLAN OF CARE
Problem: Pain  Goal: Verbalizes/displays adequate comfort level or baseline comfort level  Outcome: Progressing  Flowsheets (Taken 6/10/2022 1945)  Verbalizes/displays adequate comfort level or baseline comfort level: Encourage patient to monitor pain and request assistance     Problem: Safety - Adult  Goal: Free from fall injury  Outcome: Progressing  Flowsheets (Taken 6/10/2022 1945)  Free From Fall Injury: Instruct family/caregiver on patient safety     Problem: Anxiety  Goal: Will report anxiety at manageable levels  Description: INTERVENTIONS:  1. Administer medication as ordered  2. Teach and rehearse alternative coping skills  3.  Provide emotional support with 1:1 interaction with staff  Outcome: Progressing     Problem: ABCDS Injury Assessment  Goal: Absence of physical injury  Outcome: Progressing  Flowsheets (Taken 6/10/2022 1945)  Absence of Physical Injury: Implement safety measures based on patient assessment     Problem: Discharge Planning  Goal: Discharge to home or other facility with appropriate resources  Outcome: Progressing  Flowsheets (Taken 6/10/2022 1945)  Discharge to home or other facility with appropriate resources: Identify barriers to discharge with patient and caregiver     Problem: Nutrition Deficit:  Goal: Optimize nutritional status  Outcome: Progressing

## 2022-06-11 NOTE — PROGRESS NOTES
Department of Surgery   General Surgery  Dr. Daniela Ford Progress Note      SUBJECTIVE:  Patient seen/covering for  [facility]; Patent reports he had a small BM during the night; No nausea/fevers. His pain is less, and he does no know where the bruising on the anterior abdominal wall in the left lower quadrant has come from. He is hungry, and passing flatus. Nurses report no fever/chills/ or problems overnight    OBJECTIVE      Physical    VITALS:  BP (!) 160/94   Pulse 62   Temp 97.5 °F (36.4 °C) (Oral)   Resp 18   Ht 5' 7\" (1.702 m)   Wt 264 lb 3.2 oz (119.8 kg)   SpO2 97%   BMI 41.38 kg/m²   INTAKE/OUTPUT:    Intake/Output Summary (Last 24 hours) at 2022 0726  Last data filed at 6/10/2022 9182  Gross per 24 hour   Intake 2075 ml   Output 300 ml   Net 1775 ml     URINARY CATHETER OUTPUT (Desai):   [  DRAIN/TUBE OUTPUT:   [  TEMPERATURE:  Current - Temp: 97.5 °F (36.4 °C); Max - Temp  Av.4 °F (36.3 °C)  Min: 97.2 °F (36.2 °C)  Max: 97.6 °F (36.4 °C)  RESPIRATIONS RANGE: Resp  Av  Min: 18  Max: 18  PULSE RANGE: Pulse  Av.7  Min: 50  Max: 62  BLOOD PRESSURE RANGE:  Systolic (76FEI), HY , Min:146 , PAW:292   ; Diastolic (21NCZ), AAF:64, Min:87, Max:100    PULSE OXIMETRY RANGE: SpO2  Av.7 %  Min: 96 %  Max: 97 %    General Appearance:  awake, alert, oriented, in no acute distress  Skin:  Skin color, texture, turgor normal. No rashes or lesions. Back:  no pain to palpation  Lungs:  Normal expansion. Clear to auscultation. No rales, rhonchi, or wheezing. Heart:  Heart sounds are normal.  Regular rate and rhythm without murmur, gallop or rub. Abdomen:  Soft, non-tender, normal bowel sounds. No bruits, organomegaly or masses. Extremities: Extremities warm to touch, pink, with no edema.   Musculoskeletal:  negative  Peripheral Pulses:  Capillary refill <2secs, strong peripheral pulses  Neurologic:  negative    Data  CBC with Differential:    Lab Results   Component Value Date    WBC 11.2 06/11/2022    RBC 4.03 06/11/2022    HGB 11.1 06/11/2022    HCT 35.2 06/11/2022     06/11/2022    MCV 87.3 06/11/2022    MCH 27.5 06/11/2022    MCHC 31.5 06/11/2022    RDW 15.4 06/11/2022    NRBC 0.0 06/11/2022    METASPCT 0.9 06/11/2022    LYMPHOPCT 13.0 06/11/2022    MONOPCT 9.6 06/11/2022    MYELOPCT 1.7 06/11/2022    BASOPCT 0.0 06/11/2022    MONOSABS 1.12 06/11/2022    LYMPHSABS 1.68 06/11/2022    EOSABS 0.39 06/11/2022    BASOSABS 0.00 06/11/2022     CMP:    Lab Results   Component Value Date     06/11/2022    K 3.3 06/11/2022    K 3.7 06/05/2022     06/11/2022    CO2 25 06/11/2022    BUN 23 06/11/2022    CREATININE 0.9 06/11/2022    GFRAA >60 06/11/2022    LABGLOM >60 06/11/2022    GLUCOSE 99 06/11/2022    PROT 7.2 06/05/2022    LABALBU 3.7 06/05/2022    CALCIUM 8.0 06/11/2022    BILITOT 0.7 06/05/2022    ALKPHOS 125 06/05/2022    AST 35 06/05/2022    ALT 49 06/05/2022     BMP:  Hepatic Function Panel:  Ionized Calcium:  No results found for: IONCA  Magnesium:    Lab Results   Component Value Date    MG 2.0 06/11/2022     Phosphorus:    Lab Results   Component Value Date    PHOS 3.3 06/11/2022     LDH:    Lab Results   Component Value Date     01/24/2017     Uric Acid:  No components found for: URIC  PT/INR:    Lab Results   Component Value Date    PROTIME 11.2 01/23/2017    INR 1.0 01/23/2017     Warfarin PT/INR:  No components found for: PTPATWAR, PTINRWAR  PTT:  No results found for: APTT[APTT  Troponin:    Lab Results   Component Value Date    TROPONINI <0.01 01/23/2017     Last 3 Troponin:    Lab Results   Component Value Date    TROPONINI <0.01 01/23/2017     Urine Culture:  No components found for: CURINE  Blood Culture:  No components found for: CBLOOD, CFUNGUSBL  Blood Culture from [facility]:  No components found for: CBLOODLN  Stool Culture:  No components found for: CSTOOL  Sputum Culture:  No components found for: CSPUTUM  Sputum Culture for AFB:  No

## 2022-06-12 ENCOUNTER — APPOINTMENT (OUTPATIENT)
Dept: CT IMAGING | Age: 47
DRG: 329 | End: 2022-06-12
Payer: COMMERCIAL

## 2022-06-12 LAB
ANION GAP SERPL CALCULATED.3IONS-SCNC: 10 MMOL/L (ref 7–16)
ANISOCYTOSIS: ABNORMAL
BASOPHILS ABSOLUTE: 0 E9/L (ref 0–0.2)
BASOPHILS RELATIVE PERCENT: 0 % (ref 0–2)
BUN BLDV-MCNC: 9 MG/DL (ref 6–20)
CALCIUM SERPL-MCNC: 8.1 MG/DL (ref 8.6–10.2)
CHLORIDE BLD-SCNC: 104 MMOL/L (ref 98–107)
CO2: 23 MMOL/L (ref 22–29)
CREAT SERPL-MCNC: 0.8 MG/DL (ref 0.7–1.2)
EOSINOPHILS ABSOLUTE: 0.75 E9/L (ref 0.05–0.5)
EOSINOPHILS RELATIVE PERCENT: 5 % (ref 0–6)
GFR AFRICAN AMERICAN: >60
GFR NON-AFRICAN AMERICAN: >60 ML/MIN/1.73
GLUCOSE BLD-MCNC: 141 MG/DL (ref 74–99)
HCT VFR BLD CALC: 37.5 % (ref 37–54)
HCT VFR BLD CALC: 39.1 % (ref 37–54)
HEMOGLOBIN: 12 G/DL (ref 12.5–16.5)
HEMOGLOBIN: 12.5 G/DL (ref 12.5–16.5)
LYMPHOCYTES ABSOLUTE: 1.64 E9/L (ref 1.5–4)
LYMPHOCYTES RELATIVE PERCENT: 11 % (ref 20–42)
MAGNESIUM: 2.1 MG/DL (ref 1.6–2.6)
MCH RBC QN AUTO: 27.5 PG (ref 26–35)
MCH RBC QN AUTO: 27.6 PG (ref 26–35)
MCHC RBC AUTO-ENTMCNC: 32 % (ref 32–34.5)
MCHC RBC AUTO-ENTMCNC: 32 % (ref 32–34.5)
MCV RBC AUTO: 86 FL (ref 80–99.9)
MCV RBC AUTO: 86.3 FL (ref 80–99.9)
MONOCYTES ABSOLUTE: 0.6 E9/L (ref 0.1–0.95)
MONOCYTES RELATIVE PERCENT: 4 % (ref 2–12)
NEUTROPHILS ABSOLUTE: 11.92 E9/L (ref 1.8–7.3)
NEUTROPHILS RELATIVE PERCENT: 80 % (ref 43–80)
PDW BLD-RTO: 15.3 FL (ref 11.5–15)
PDW BLD-RTO: 15.3 FL (ref 11.5–15)
PHOSPHORUS: 3.1 MG/DL (ref 2.5–4.5)
PLATELET # BLD: 378 E9/L (ref 130–450)
PLATELET # BLD: 397 E9/L (ref 130–450)
PMV BLD AUTO: 9.7 FL (ref 7–12)
PMV BLD AUTO: 9.8 FL (ref 7–12)
POLYCHROMASIA: ABNORMAL
POTASSIUM SERPL-SCNC: 3.7 MMOL/L (ref 3.5–5)
RBC # BLD: 4.36 E12/L (ref 3.8–5.8)
RBC # BLD: 4.53 E12/L (ref 3.8–5.8)
SODIUM BLD-SCNC: 137 MMOL/L (ref 132–146)
WBC # BLD: 14.9 E9/L (ref 4.5–11.5)
WBC # BLD: 15.7 E9/L (ref 4.5–11.5)

## 2022-06-12 PROCEDURE — 36415 COLL VENOUS BLD VENIPUNCTURE: CPT

## 2022-06-12 PROCEDURE — 1200000000 HC SEMI PRIVATE

## 2022-06-12 PROCEDURE — 6360000002 HC RX W HCPCS: Performed by: SPECIALIST

## 2022-06-12 PROCEDURE — 84100 ASSAY OF PHOSPHORUS: CPT

## 2022-06-12 PROCEDURE — 6360000004 HC RX CONTRAST MEDICATION: Performed by: RADIOLOGY

## 2022-06-12 PROCEDURE — 85025 COMPLETE CBC W/AUTO DIFF WBC: CPT

## 2022-06-12 PROCEDURE — 6370000000 HC RX 637 (ALT 250 FOR IP): Performed by: SPECIALIST

## 2022-06-12 PROCEDURE — 2580000003 HC RX 258: Performed by: SPECIALIST

## 2022-06-12 PROCEDURE — 80048 BASIC METABOLIC PNL TOTAL CA: CPT

## 2022-06-12 PROCEDURE — 83735 ASSAY OF MAGNESIUM: CPT

## 2022-06-12 PROCEDURE — 6360000002 HC RX W HCPCS: Performed by: STUDENT IN AN ORGANIZED HEALTH CARE EDUCATION/TRAINING PROGRAM

## 2022-06-12 PROCEDURE — 2580000003 HC RX 258: Performed by: STUDENT IN AN ORGANIZED HEALTH CARE EDUCATION/TRAINING PROGRAM

## 2022-06-12 PROCEDURE — 74177 CT ABD & PELVIS W/CONTRAST: CPT

## 2022-06-12 PROCEDURE — 2500000003 HC RX 250 WO HCPCS: Performed by: SURGERY

## 2022-06-12 PROCEDURE — 85027 COMPLETE CBC AUTOMATED: CPT

## 2022-06-12 PROCEDURE — 87040 BLOOD CULTURE FOR BACTERIA: CPT

## 2022-06-12 PROCEDURE — 6370000000 HC RX 637 (ALT 250 FOR IP): Performed by: STUDENT IN AN ORGANIZED HEALTH CARE EDUCATION/TRAINING PROGRAM

## 2022-06-12 PROCEDURE — 2580000003 HC RX 258: Performed by: ANESTHESIOLOGY

## 2022-06-12 RX ORDER — FLUCONAZOLE 100 MG/1
200 TABLET ORAL DAILY
Status: DISCONTINUED | OUTPATIENT
Start: 2022-06-12 | End: 2022-06-14

## 2022-06-12 RX ADMIN — FLUCONAZOLE 200 MG: 100 TABLET ORAL at 15:34

## 2022-06-12 RX ADMIN — POTASSIUM CHLORIDE, DEXTROSE MONOHYDRATE AND SODIUM CHLORIDE: 224; 5; 450 INJECTION, SOLUTION INTRAVENOUS at 15:40

## 2022-06-12 RX ADMIN — METRONIDAZOLE 500 MG: 500 TABLET ORAL at 05:25

## 2022-06-12 RX ADMIN — KETOROLAC TROMETHAMINE 15 MG: 30 INJECTION, SOLUTION INTRAMUSCULAR; INTRAVENOUS at 06:44

## 2022-06-12 RX ADMIN — KETOROLAC TROMETHAMINE 15 MG: 30 INJECTION, SOLUTION INTRAMUSCULAR; INTRAVENOUS at 01:29

## 2022-06-12 RX ADMIN — MEROPENEM 1000 MG: 1 INJECTION, POWDER, FOR SOLUTION INTRAVENOUS at 15:43

## 2022-06-12 RX ADMIN — METHOCARBAMOL 500 MG: 100 INJECTION, SOLUTION INTRAMUSCULAR; INTRAVENOUS at 14:00

## 2022-06-12 RX ADMIN — METHOCARBAMOL 500 MG: 100 INJECTION, SOLUTION INTRAMUSCULAR; INTRAVENOUS at 22:04

## 2022-06-12 RX ADMIN — SODIUM CHLORIDE, PRESERVATIVE FREE 10 ML: 5 INJECTION INTRAVENOUS at 06:45

## 2022-06-12 RX ADMIN — KETOROLAC TROMETHAMINE 15 MG: 30 INJECTION, SOLUTION INTRAMUSCULAR; INTRAVENOUS at 13:59

## 2022-06-12 RX ADMIN — ENOXAPARIN SODIUM 30 MG: 100 INJECTION SUBCUTANEOUS at 08:42

## 2022-06-12 RX ADMIN — IOHEXOL 50 ML: 240 INJECTION, SOLUTION INTRATHECAL; INTRAVASCULAR; INTRAVENOUS; ORAL at 12:54

## 2022-06-12 RX ADMIN — ENOXAPARIN SODIUM 30 MG: 100 INJECTION SUBCUTANEOUS at 21:06

## 2022-06-12 RX ADMIN — SODIUM CHLORIDE, PRESERVATIVE FREE 10 ML: 5 INJECTION INTRAVENOUS at 08:42

## 2022-06-12 RX ADMIN — POTASSIUM CHLORIDE, DEXTROSE MONOHYDRATE AND SODIUM CHLORIDE: 224; 5; 450 INJECTION, SOLUTION INTRAVENOUS at 05:24

## 2022-06-12 RX ADMIN — SODIUM CHLORIDE, PRESERVATIVE FREE 10 ML: 5 INJECTION INTRAVENOUS at 21:06

## 2022-06-12 RX ADMIN — SODIUM CHLORIDE, PRESERVATIVE FREE 10 ML: 5 INJECTION INTRAVENOUS at 01:30

## 2022-06-12 RX ADMIN — KETOROLAC TROMETHAMINE 15 MG: 30 INJECTION, SOLUTION INTRAMUSCULAR; INTRAVENOUS at 19:28

## 2022-06-12 RX ADMIN — IOPAMIDOL 50 ML: 755 INJECTION, SOLUTION INTRAVENOUS at 12:54

## 2022-06-12 RX ADMIN — CEFDINIR 300 MG: 300 CAPSULE ORAL at 08:41

## 2022-06-12 RX ADMIN — METHOCARBAMOL 500 MG: 100 INJECTION, SOLUTION INTRAMUSCULAR; INTRAVENOUS at 03:44

## 2022-06-12 ASSESSMENT — PAIN DESCRIPTION - DESCRIPTORS
DESCRIPTORS: SHARP
DESCRIPTORS: ACHING;CRAMPING
DESCRIPTORS: ACHING;SORE
DESCRIPTORS: ACHING

## 2022-06-12 ASSESSMENT — PAIN - FUNCTIONAL ASSESSMENT
PAIN_FUNCTIONAL_ASSESSMENT: ACTIVITIES ARE NOT PREVENTED
PAIN_FUNCTIONAL_ASSESSMENT: ACTIVITIES ARE NOT PREVENTED

## 2022-06-12 ASSESSMENT — PAIN DESCRIPTION - LOCATION
LOCATION: ABDOMEN

## 2022-06-12 ASSESSMENT — PAIN SCALES - GENERAL
PAINLEVEL_OUTOF10: 6
PAINLEVEL_OUTOF10: 3

## 2022-06-12 ASSESSMENT — PAIN DESCRIPTION - ORIENTATION
ORIENTATION: MID

## 2022-06-12 NOTE — PROGRESS NOTES
Department of Surgery   General Surgery  Dr. Oz Hayward Progress Note      SUBJECTIVE:  Patient feels ok; No nausea/ emesis;  +BMs. Up walking. No fevers reported. OBJECTIVE      Physical    VITALS:  BP (!) 142/90 Comment: manual  Pulse 62   Temp 98.2 °F (36.8 °C) (Oral)   Resp 18   Ht 5' 7\" (1.702 m)   Wt 264 lb 3.2 oz (119.8 kg)   SpO2 99%   BMI 41.38 kg/m²   INTAKE/OUTPUT:    Intake/Output Summary (Last 24 hours) at 2022 0739  Last data filed at 2022 0530  Gross per 24 hour   Intake 4787.3 ml   Output --   Net 4787.3 ml     URINARY CATHETER OUTPUT (Desai):   [  DRAIN/TUBE OUTPUT:   [  TEMPERATURE:  Current - Temp: 98.2 °F (36.8 °C); Max - Temp  Av.2 °F (36.8 °C)  Min: 98.2 °F (36.8 °C)  Max: 98.2 °F (36.8 °C)  RESPIRATIONS RANGE: Resp  Av  Min: 18  Max: 18  PULSE RANGE: Pulse  Av  Min: 62  Max: 62  BLOOD PRESSURE RANGE:  Systolic (65UVU), JNV:538 , Min:142 , MONICA:922   ; Diastolic (80INV), VZY:225, Min:90, Max:119    PULSE OXIMETRY RANGE: SpO2  Av %  Min: 99 %  Max: 99 %    General Appearance:  awake, alert, oriented, in no acute distress  Skin:  Skin color, texture, turgor normal. No rashes or lesions. Back:  no pain to palpation  Lungs:  Normal expansion. Clear to auscultation. No rales, rhonchi, or wheezing., No chest wall tenderness. Heart:  Heart sounds are normal.  Regular rate and rhythm without murmur, gallop or rub. Abdomen:  Soft, non-tender, normal bowel sounds. No bruits, organomegaly or masses.   Hematoma in LLQ  Extremities: pulses present in all extremities  Musculoskeletal:  negative  Peripheral Pulses:  Capillary refill <2secs, strong peripheral pulses  Neurologic:  negative    Data  CBC with Differential:    Lab Results   Component Value Date    WBC 14.9 2022    RBC 4.36 2022    HGB 12.0 2022    HCT 37.5 2022     2022    MCV 86.0 2022    MCH 27.5 2022    MCHC 32.0 2022    RDW 15.3 2022 NRBC 0.0 06/11/2022    METASPCT 0.9 06/11/2022    LYMPHOPCT 11.0 06/12/2022    MONOPCT 4.0 06/12/2022    MYELOPCT 1.7 06/11/2022    BASOPCT 0.0 06/12/2022    MONOSABS 0.60 06/12/2022    LYMPHSABS 1.64 06/12/2022    EOSABS 0.75 06/12/2022    BASOSABS 0.00 06/12/2022     CMP:    Lab Results   Component Value Date     06/12/2022    K 3.7 06/12/2022    K 3.7 06/05/2022     06/12/2022    CO2 23 06/12/2022    BUN 9 06/12/2022    CREATININE 0.8 06/12/2022    GFRAA >60 06/12/2022    LABGLOM >60 06/12/2022    GLUCOSE 141 06/12/2022    PROT 7.2 06/05/2022    LABALBU 3.7 06/05/2022    CALCIUM 8.1 06/12/2022    BILITOT 0.7 06/05/2022    ALKPHOS 125 06/05/2022    AST 35 06/05/2022    ALT 49 06/05/2022     BMP:  Hepatic Function Panel:  Ionized Calcium:  No results found for: IONCA  Magnesium:    Lab Results   Component Value Date    MG 2.1 06/12/2022     Phosphorus:    Lab Results   Component Value Date    PHOS 3.1 06/12/2022     LDH:    Lab Results   Component Value Date     01/24/2017     Uric Acid:  No components found for: URIC  PT/INR:    Lab Results   Component Value Date    PROTIME 11.2 01/23/2017    INR 1.0 01/23/2017     Warfarin PT/INR:  No components found for: PTPATWAR, PTINRWAR  PTT:  No results found for: APTT[APTT  Troponin:    Lab Results   Component Value Date    TROPONINI <0.01 01/23/2017     Last 3 Troponin:    Lab Results   Component Value Date    TROPONINI <0.01 01/23/2017     Urine Culture:  No components found for: CURINE  Blood Culture:  No components found for: CBLOOD, CFUNGUSBL  Blood Culture from [facility]:  No components found for: CBLOODLN  Stool Culture:  No components found for: CSTOOL  Sputum Culture:  No components found for: CSPUTUM  Sputum Culture for AFB:  No components found for: CAFBSM  Throat Culture:  No components found for: CTHROAT    Vancomycin:  Peak:  No components found for: VANCOPOST   Trough:  No components found for: VANCOPRE  Digoxin:  No results found

## 2022-06-12 NOTE — CONSULTS
[address] [place] [address] [facility]    Consultation Note     Admit Date: 6/5/2022 11:55 AM    Reason for Consult:   Leukocytosis diverticular abscess responding to oral antimicrobial    Attending Physician:  Joshua Manrique MD      Chief Complaint: 1 week of abdominal pain    HISTORY OF PRESENT ILLNESS:   The patient is a 50 y.o. man not known to the Infectious Diseases service. The patient is admitted through the emergency room on 6/5/2022 with complaints of abdominal pain, chills and associated nausea and obstipation. Work-up ensued and he was found to have a diverticular problem with sigmoid diverticulitis at that time there is no evidence of abscess according to radiology. He did have a leukocytosis on the day after admission that was 16.8. He was on Zosyn for 48 hours and this was changed to ceftriaxone and Flagyl. Past couple days (6/10/2022) he was flipped to cefdinir and Flagyl. At the time of the changed to orals his white count had been coming down but now its been up for the last couple days and is not feeling well and appears to be toxic although he has no fevers. ID was asked to evaluate for the fact that over the next couple CAT scans the abscess is still present in the pelvis. .    Past Medical History:        Diagnosis Date    Depression     Hypertension      Past Surgical History:        Procedure Laterality Date    COLONOSCOPY N/A 6/8/2022    COLONOSCOPY DIAGNOSTIC performed by Joshua Manrique MD at [address] [place] [address]       Current Medications:   Scheduled Meds:   meropenem  1,000 mg IntraVENous Q8H    fluconazole  200 mg Oral Daily    ketorolac  15 mg IntraVENous Q6H    sodium chloride flush  5-40 mL IntraVENous 2 times per day    acetaminophen  1,000 mg Oral q8h    methocarbamol IVPB  500 mg IntraVENous Q8H    enoxaparin  30 mg SubCUTAneous BID     Continuous Infusions:   dextrose 5% and 0.45% NaCl with KCl 30 mEq 100 mL/hr at 06/12/22 0514    sodium chloride      lactated ringers 50 mL/hr at 06/11/22 1712     PRN Meds:sodium chloride flush, sodium chloride, fentanNYL, HYDROmorphone, labetalol **OR** hydrALAZINE, ondansetron, HYDROmorphone    Allergies:  Patient has no known allergies. Social History:   Social History     Socioeconomic History    Marital status:      Spouse name: Not on file    Number of children: Not on file    Years of education: Not on file    Highest education level: Not on file   Occupational History    Not on file   Tobacco Use    Smoking status: Never Smoker    Smokeless tobacco: Never Used   Substance and Sexual Activity    Alcohol use: Yes     Alcohol/week: 3.0 standard drinks     Types: 3 Glasses of wine per week    Drug use: No    Sexual activity: Not on file   Other Topics Concern    Not on file   Social History Narrative    Not on file     Social Determinants of Health     Financial Resource Strain:     Difficulty of Paying Living Expenses: Not on file   Food Insecurity:     Worried About Running Out of Food in the Last Year: Not on file    Luis of Food in the Last Year: Not on file   Transportation Needs:     Lack of Transportation (Medical): Not on file    Lack of Transportation (Non-Medical):  Not on file   Physical Activity:     Days of Exercise per Week: Not on file    Minutes of Exercise per Session: Not on file   Stress:     Feeling of Stress : Not on file   Social Connections:     Frequency of Communication with Friends and Family: Not on file    Frequency of Social Gatherings with Friends and Family: Not on file    Attends Restoration Services: Not on file    Active Member of Clubs or Organizations: Not on file    Attends Club or Organization Meetings: Not on file    Marital Status: Not on file   Intimate Partner Violence:     Fear of Current or Ex-Partner: Not on file    Emotionally Abused: Not on file    Physically Abused: Not on file    Sexually Abused: Not on file Male  Extremities: No clubbing, no cyanosis, no edema. Musculoskeletal: Equal and symmetrical  Neurological: No focal  Lines: peripheral      CBC+dif:  Recent Labs     06/11/22  0333 06/11/22  0333 06/12/22  0350 06/12/22  0350 06/12/22  0936   WBC 11.2  --  14.9*  --  15.7*   HGB 11.1*   < > 12.0*   < > 12.5   HCT 35.2*   < > 37.5   < > 39.1   MCV 87.3   < > 86.0   < > 86.3      < > 397   < > 378   NEUTROABS 8.06*   < > 11.92*  --   --     < > = values in this interval not displayed. Lab Results   Component Value Date    CRP 29.6 (H) 06/08/2022     No results found for: CRP  Lab Results   Component Value Date    SEDRATE 58 (H) 06/08/2022     Lab Results   Component Value Date    ALT 49 (H) 06/05/2022    AST 35 06/05/2022    ALKPHOS 125 06/05/2022    BILITOT 0.7 06/05/2022     Lab Results   Component Value Date     06/12/2022    K 3.7 06/12/2022    K 3.7 06/05/2022     06/12/2022    CO2 23 06/12/2022    BUN 9 06/12/2022    CREATININE 0.8 06/12/2022    GFRAA >60 06/12/2022    LABGLOM >60 06/12/2022    GLUCOSE 141 06/12/2022    PROT 7.2 06/05/2022    LABALBU 3.7 06/05/2022    CALCIUM 8.1 06/12/2022    BILITOT 0.7 06/05/2022    ALKPHOS 125 06/05/2022    AST 35 06/05/2022    ALT 49 06/05/2022       Lab Results   Component Value Date    PROTIME 11.2 01/23/2017    INR 1.0 01/23/2017       No results found for: TSH    Lab Results   Component Value Date    COLORU Yellow 06/05/2022    PHUR 6.5 06/05/2022    WBCUA NONE 06/05/2022    RBCUA NONE 06/05/2022    BACTERIA NONE SEEN 06/05/2022    CLARITYU Clear 06/05/2022    SPECGRAV <=1.005 06/05/2022    LEUKOCYTESUR Negative 06/05/2022    UROBILINOGEN 1.0 06/05/2022    BILIRUBINUR Negative 06/05/2022    BLOODU Negative 06/05/2022    GLUCOSEU Negative 06/05/2022       No results found for: KFK7JBX, BEART, C0KERXBK, PHART, THGBART, ZCU2HTF, PO2ART, ZLI4TMU  Radiology:  CT ABDOMEN PELVIS W IV CONTRAST Additional Contrast? Oral   Final Result   1. Improvement in bowel distention compared to the prior study. 2.  New small pleural effusions and mildly increased abdominal ascites. 3.  Inflammatory changes involving the sigmoid colon and left colon within   the left lower abdomen with inflammatory changes of the peritoneal lining and   mesentery. Unchanged free fluid collection in the left lower quadrant   amongst bowel loops. 4.  Slight interval decrease in size of the lobulated abscess in the pelvic   cul-de-sac. XR ABDOMEN (KUB) (SINGLE AP VIEW)   Final Result   Dilated bowel as noted. The findings as well as those on the prior   correlated studies favor that this represents an adynamic state. See above. XR CHEST ABDOMEN NG PLACEMENT   Final Result   Satisfactory position of the enteric tube. CT ABDOMEN PELVIS W IV CONTRAST Additional Contrast? Oral   Final Result   Distended fluid-filled loops of small bowel throughout the abdomen and pelvis   with distension is well seen of the stomach to suggest ileus. There is air   identified throughout the colon with no evidence of transition. Improvement   seen in the wall thickening of the sigmoid colon however with development of   a small abscess between the bladder and rectum just superior to the prostate. The amount of fluid within the abdomen and pelvis is similar when compared to   the prior examination. CT ABDOMEN PELVIS W IV CONTRAST Additional Contrast? None   Final Result   There is abnormal wall thickening identified of the sigmoid colon with   surrounding stranding and fluid to suggest acute diverticulitis or focal   colitis. No evidence of perforation or abscess at this time. Fluid is seen   surrounding both the liver and spleen as well as throughout the small bowel   mesentery. .         XR CHEST (2 VW)   Final Result   No acute process. Microbiology:  Pending  No results for input(s): BC in the last 72 hours.   No results for input(s): ORG in the last 72 hours. No results for input(s): [place] in the last 72 hours. No results for input(s): STREPNEUMAGU in the last 72 hours. No results for input(s): LP1UAG in the last 72 hours. No results for input(s): ASO in the last 72 hours. No results for input(s): CULTRESP in the last 72 hours. Assessment:  · Diverticulitis with pelvic abscess  · Leukocytosis related to undrained abscess    Plan:    · Cont expanding antibiotics. Patient seen Zosyn and seen Rocephin and cefdinir and Flagyl and there may be resistance or fungal overgrowth at this point and therefore I will move  to Merrem and Diflucan pending cultures  · Discussed with Dr. Lety Farmer  · Check cultures  · Baseline ESR, CRP  · Monitor labs  · Will follow with you    Thank you for having us see this patient in consultation. I will be discussing this case with the treating physicians.       Electronically signed by Devorah Glynn MD on 6/12/2022 at 1:57 PM

## 2022-06-13 ENCOUNTER — ANESTHESIA EVENT (OUTPATIENT)
Dept: OPERATING ROOM | Age: 47
DRG: 329 | End: 2022-06-13
Payer: COMMERCIAL

## 2022-06-13 ENCOUNTER — ANESTHESIA (OUTPATIENT)
Dept: OPERATING ROOM | Age: 47
DRG: 329 | End: 2022-06-13
Payer: COMMERCIAL

## 2022-06-13 LAB
ANION GAP SERPL CALCULATED.3IONS-SCNC: 9 MMOL/L (ref 7–16)
BASOPHILS ABSOLUTE: 0 E9/L (ref 0–0.2)
BASOPHILS RELATIVE PERCENT: 0 % (ref 0–2)
BUN BLDV-MCNC: 6 MG/DL (ref 6–20)
CALCIUM SERPL-MCNC: 8.6 MG/DL (ref 8.6–10.2)
CHLORIDE BLD-SCNC: 103 MMOL/L (ref 98–107)
CO2: 26 MMOL/L (ref 22–29)
CREAT SERPL-MCNC: 1 MG/DL (ref 0.7–1.2)
EOSINOPHILS ABSOLUTE: 0.37 E9/L (ref 0.05–0.5)
EOSINOPHILS RELATIVE PERCENT: 2.6 % (ref 0–6)
GFR AFRICAN AMERICAN: >60
GFR NON-AFRICAN AMERICAN: >60 ML/MIN/1.73
GLUCOSE BLD-MCNC: 118 MG/DL (ref 74–99)
HCT VFR BLD CALC: 40 % (ref 37–54)
HEMOGLOBIN: 12.7 G/DL (ref 12.5–16.5)
LYMPHOCYTES ABSOLUTE: 1.56 E9/L (ref 1.5–4)
LYMPHOCYTES RELATIVE PERCENT: 11.3 % (ref 20–42)
MAGNESIUM: 2.3 MG/DL (ref 1.6–2.6)
MCH RBC QN AUTO: 27.7 PG (ref 26–35)
MCHC RBC AUTO-ENTMCNC: 31.8 % (ref 32–34.5)
MCV RBC AUTO: 87.1 FL (ref 80–99.9)
METER GLUCOSE: 148 MG/DL (ref 74–99)
MONOCYTES ABSOLUTE: 0.43 E9/L (ref 0.1–0.95)
MONOCYTES RELATIVE PERCENT: 2.6 % (ref 2–12)
NEUTROPHILS ABSOLUTE: 11.93 E9/L (ref 1.8–7.3)
NEUTROPHILS RELATIVE PERCENT: 83.5 % (ref 43–80)
NUCLEATED RED BLOOD CELLS: 0 /100 WBC
PDW BLD-RTO: 15.4 FL (ref 11.5–15)
PHOSPHORUS: 3.2 MG/DL (ref 2.5–4.5)
PLATELET # BLD: 398 E9/L (ref 130–450)
PMV BLD AUTO: 9.9 FL (ref 7–12)
POTASSIUM SERPL-SCNC: 4.2 MMOL/L (ref 3.5–5)
RBC # BLD: 4.59 E12/L (ref 3.8–5.8)
RBC # BLD: NORMAL 10*6/UL
SODIUM BLD-SCNC: 138 MMOL/L (ref 132–146)
WBC # BLD: 14.2 E9/L (ref 4.5–11.5)

## 2022-06-13 PROCEDURE — 6360000002 HC RX W HCPCS: Performed by: STUDENT IN AN ORGANIZED HEALTH CARE EDUCATION/TRAINING PROGRAM

## 2022-06-13 PROCEDURE — 88307 TISSUE EXAM BY PATHOLOGIST: CPT

## 2022-06-13 PROCEDURE — 2720000010 HC SURG SUPPLY STERILE: Performed by: SURGERY

## 2022-06-13 PROCEDURE — 6360000002 HC RX W HCPCS: Performed by: NURSE ANESTHETIST, CERTIFIED REGISTERED

## 2022-06-13 PROCEDURE — 2580000003 HC RX 258: Performed by: ANESTHESIOLOGY

## 2022-06-13 PROCEDURE — 3700000001 HC ADD 15 MINUTES (ANESTHESIA): Performed by: SURGERY

## 2022-06-13 PROCEDURE — 87081 CULTURE SCREEN ONLY: CPT

## 2022-06-13 PROCEDURE — 36415 COLL VENOUS BLD VENIPUNCTURE: CPT

## 2022-06-13 PROCEDURE — 6360000002 HC RX W HCPCS: Performed by: SPECIALIST

## 2022-06-13 PROCEDURE — 6360000002 HC RX W HCPCS: Performed by: ANESTHESIOLOGY

## 2022-06-13 PROCEDURE — 2580000003 HC RX 258: Performed by: NURSE ANESTHETIST, CERTIFIED REGISTERED

## 2022-06-13 PROCEDURE — 1200000000 HC SEMI PRIVATE

## 2022-06-13 PROCEDURE — 2580000003 HC RX 258: Performed by: SPECIALIST

## 2022-06-13 PROCEDURE — 0DBU0ZZ EXCISION OF OMENTUM, OPEN APPROACH: ICD-10-PCS | Performed by: SURGERY

## 2022-06-13 PROCEDURE — 87116 MYCOBACTERIA CULTURE: CPT

## 2022-06-13 PROCEDURE — 3600000013 HC SURGERY LEVEL 3 ADDTL 15MIN: Performed by: SURGERY

## 2022-06-13 PROCEDURE — 87206 SMEAR FLUORESCENT/ACID STAI: CPT

## 2022-06-13 PROCEDURE — 88304 TISSUE EXAM BY PATHOLOGIST: CPT

## 2022-06-13 PROCEDURE — 87102 FUNGUS ISOLATION CULTURE: CPT

## 2022-06-13 PROCEDURE — 2500000003 HC RX 250 WO HCPCS: Performed by: NURSE ANESTHETIST, CERTIFIED REGISTERED

## 2022-06-13 PROCEDURE — 3700000000 HC ANESTHESIA ATTENDED CARE: Performed by: SURGERY

## 2022-06-13 PROCEDURE — 80048 BASIC METABOLIC PNL TOTAL CA: CPT

## 2022-06-13 PROCEDURE — 85025 COMPLETE CBC W/AUTO DIFF WBC: CPT

## 2022-06-13 PROCEDURE — 7100000000 HC PACU RECOVERY - FIRST 15 MIN: Performed by: SURGERY

## 2022-06-13 PROCEDURE — 82962 GLUCOSE BLOOD TEST: CPT

## 2022-06-13 PROCEDURE — 0DTJ0ZZ RESECTION OF APPENDIX, OPEN APPROACH: ICD-10-PCS | Performed by: SURGERY

## 2022-06-13 PROCEDURE — 6370000000 HC RX 637 (ALT 250 FOR IP): Performed by: STUDENT IN AN ORGANIZED HEALTH CARE EDUCATION/TRAINING PROGRAM

## 2022-06-13 PROCEDURE — 0DTG0ZZ RESECTION OF LEFT LARGE INTESTINE, OPEN APPROACH: ICD-10-PCS | Performed by: SURGERY

## 2022-06-13 PROCEDURE — 2700000000 HC OXYGEN THERAPY PER DAY

## 2022-06-13 PROCEDURE — 2709999900 HC NON-CHARGEABLE SUPPLY: Performed by: SURGERY

## 2022-06-13 PROCEDURE — 7100000001 HC PACU RECOVERY - ADDTL 15 MIN: Performed by: SURGERY

## 2022-06-13 PROCEDURE — 87205 SMEAR GRAM STAIN: CPT

## 2022-06-13 PROCEDURE — 84100 ASSAY OF PHOSPHORUS: CPT

## 2022-06-13 PROCEDURE — 83735 ASSAY OF MAGNESIUM: CPT

## 2022-06-13 PROCEDURE — 88305 TISSUE EXAM BY PATHOLOGIST: CPT

## 2022-06-13 PROCEDURE — 0D1L0Z4 BYPASS TRANSVERSE COLON TO CUTANEOUS, OPEN APPROACH: ICD-10-PCS | Performed by: SURGERY

## 2022-06-13 PROCEDURE — 2580000003 HC RX 258: Performed by: STUDENT IN AN ORGANIZED HEALTH CARE EDUCATION/TRAINING PROGRAM

## 2022-06-13 PROCEDURE — 87070 CULTURE OTHR SPECIMN AEROBIC: CPT

## 2022-06-13 PROCEDURE — 0WQF0ZZ REPAIR ABDOMINAL WALL, OPEN APPROACH: ICD-10-PCS | Performed by: SURGERY

## 2022-06-13 PROCEDURE — 2500000003 HC RX 250 WO HCPCS: Performed by: ANESTHESIOLOGIST ASSISTANT

## 2022-06-13 PROCEDURE — 3600000003 HC SURGERY LEVEL 3 BASE: Performed by: SURGERY

## 2022-06-13 PROCEDURE — 2500000003 HC RX 250 WO HCPCS: Performed by: SURGERY

## 2022-06-13 PROCEDURE — 87015 SPECIMEN INFECT AGNT CONCNTJ: CPT

## 2022-06-13 RX ORDER — SODIUM CHLORIDE 9 MG/ML
INJECTION, SOLUTION INTRAVENOUS CONTINUOUS PRN
Status: DISCONTINUED | OUTPATIENT
Start: 2022-06-13 | End: 2022-06-13 | Stop reason: SDUPTHER

## 2022-06-13 RX ORDER — MIDAZOLAM HYDROCHLORIDE 1 MG/ML
INJECTION INTRAMUSCULAR; INTRAVENOUS PRN
Status: DISCONTINUED | OUTPATIENT
Start: 2022-06-13 | End: 2022-06-13 | Stop reason: SDUPTHER

## 2022-06-13 RX ORDER — ONDANSETRON 2 MG/ML
INJECTION INTRAMUSCULAR; INTRAVENOUS PRN
Status: DISCONTINUED | OUTPATIENT
Start: 2022-06-13 | End: 2022-06-13 | Stop reason: SDUPTHER

## 2022-06-13 RX ORDER — FENTANYL CITRATE 50 UG/ML
INJECTION, SOLUTION INTRAMUSCULAR; INTRAVENOUS PRN
Status: DISCONTINUED | OUTPATIENT
Start: 2022-06-13 | End: 2022-06-13 | Stop reason: SDUPTHER

## 2022-06-13 RX ORDER — SODIUM CHLORIDE, SODIUM LACTATE, POTASSIUM CHLORIDE, CALCIUM CHLORIDE 600; 310; 30; 20 MG/100ML; MG/100ML; MG/100ML; MG/100ML
INJECTION, SOLUTION INTRAVENOUS CONTINUOUS
Status: DISCONTINUED | OUTPATIENT
Start: 2022-06-13 | End: 2022-06-15

## 2022-06-13 RX ORDER — ONDANSETRON 2 MG/ML
4 INJECTION INTRAMUSCULAR; INTRAVENOUS
Status: DISCONTINUED | OUTPATIENT
Start: 2022-06-13 | End: 2022-06-13 | Stop reason: HOSPADM

## 2022-06-13 RX ORDER — SUCCINYLCHOLINE CHLORIDE 20 MG/ML
INJECTION INTRAMUSCULAR; INTRAVENOUS PRN
Status: DISCONTINUED | OUTPATIENT
Start: 2022-06-13 | End: 2022-06-13 | Stop reason: SDUPTHER

## 2022-06-13 RX ORDER — DEXAMETHASONE SODIUM PHOSPHATE 4 MG/ML
INJECTION, SOLUTION INTRA-ARTICULAR; INTRALESIONAL; INTRAMUSCULAR; INTRAVENOUS; SOFT TISSUE PRN
Status: DISCONTINUED | OUTPATIENT
Start: 2022-06-13 | End: 2022-06-13 | Stop reason: SDUPTHER

## 2022-06-13 RX ORDER — LIDOCAINE HYDROCHLORIDE 20 MG/ML
INJECTION, SOLUTION EPIDURAL; INFILTRATION; INTRACAUDAL; PERINEURAL PRN
Status: DISCONTINUED | OUTPATIENT
Start: 2022-06-13 | End: 2022-06-13 | Stop reason: SDUPTHER

## 2022-06-13 RX ORDER — MEPERIDINE HYDROCHLORIDE 25 MG/ML
12.5 INJECTION INTRAMUSCULAR; INTRAVENOUS; SUBCUTANEOUS EVERY 5 MIN PRN
Status: DISCONTINUED | OUTPATIENT
Start: 2022-06-13 | End: 2022-06-13 | Stop reason: HOSPADM

## 2022-06-13 RX ORDER — SODIUM CHLORIDE 9 MG/ML
INJECTION, SOLUTION INTRAVENOUS PRN
Status: DISCONTINUED | OUTPATIENT
Start: 2022-06-13 | End: 2022-06-13 | Stop reason: HOSPADM

## 2022-06-13 RX ORDER — SODIUM CHLORIDE 0.9 % (FLUSH) 0.9 %
5-40 SYRINGE (ML) INJECTION EVERY 12 HOURS SCHEDULED
Status: DISCONTINUED | OUTPATIENT
Start: 2022-06-13 | End: 2022-06-13 | Stop reason: HOSPADM

## 2022-06-13 RX ORDER — HYDROMORPHONE HCL 110MG/55ML
PATIENT CONTROLLED ANALGESIA SYRINGE INTRAVENOUS PRN
Status: DISCONTINUED | OUTPATIENT
Start: 2022-06-13 | End: 2022-06-13 | Stop reason: SDUPTHER

## 2022-06-13 RX ORDER — SODIUM CHLORIDE 0.9 % (FLUSH) 0.9 %
5-40 SYRINGE (ML) INJECTION PRN
Status: DISCONTINUED | OUTPATIENT
Start: 2022-06-13 | End: 2022-06-13 | Stop reason: HOSPADM

## 2022-06-13 RX ORDER — ROCURONIUM BROMIDE 10 MG/ML
INJECTION, SOLUTION INTRAVENOUS PRN
Status: DISCONTINUED | OUTPATIENT
Start: 2022-06-13 | End: 2022-06-13 | Stop reason: SDUPTHER

## 2022-06-13 RX ORDER — PROPOFOL 10 MG/ML
INJECTION, EMULSION INTRAVENOUS PRN
Status: DISCONTINUED | OUTPATIENT
Start: 2022-06-13 | End: 2022-06-13 | Stop reason: SDUPTHER

## 2022-06-13 RX ADMIN — HYDROMORPHONE HYDROCHLORIDE 0.5 MG: 2 INJECTION, SOLUTION INTRAMUSCULAR; INTRAVENOUS; SUBCUTANEOUS at 15:40

## 2022-06-13 RX ADMIN — SODIUM CHLORIDE, POTASSIUM CHLORIDE, SODIUM LACTATE AND CALCIUM CHLORIDE: 600; 310; 30; 20 INJECTION, SOLUTION INTRAVENOUS at 19:03

## 2022-06-13 RX ADMIN — ROCURONIUM BROMIDE 10 MG: 50 INJECTION, SOLUTION INTRAVENOUS at 15:18

## 2022-06-13 RX ADMIN — HYDROMORPHONE HYDROCHLORIDE 0.5 MG: 2 INJECTION, SOLUTION INTRAMUSCULAR; INTRAVENOUS; SUBCUTANEOUS at 14:15

## 2022-06-13 RX ADMIN — KETOROLAC TROMETHAMINE 15 MG: 30 INJECTION, SOLUTION INTRAMUSCULAR; INTRAVENOUS at 00:35

## 2022-06-13 RX ADMIN — HYDROMORPHONE HYDROCHLORIDE 0.5 MG: 2 INJECTION, SOLUTION INTRAMUSCULAR; INTRAVENOUS; SUBCUTANEOUS at 14:08

## 2022-06-13 RX ADMIN — SODIUM CHLORIDE: 9 INJECTION, SOLUTION INTRAVENOUS at 13:46

## 2022-06-13 RX ADMIN — PROPOFOL 200 MG: 10 INJECTION, EMULSION INTRAVENOUS at 13:52

## 2022-06-13 RX ADMIN — METHOCARBAMOL 500 MG: 100 INJECTION, SOLUTION INTRAMUSCULAR; INTRAVENOUS at 18:22

## 2022-06-13 RX ADMIN — HYDROMORPHONE HYDROCHLORIDE 0.5 MG: 2 INJECTION, SOLUTION INTRAMUSCULAR; INTRAVENOUS; SUBCUTANEOUS at 14:49

## 2022-06-13 RX ADMIN — FENTANYL CITRATE 100 MCG: 50 INJECTION, SOLUTION INTRAMUSCULAR; INTRAVENOUS at 16:34

## 2022-06-13 RX ADMIN — ROCURONIUM BROMIDE 20 MG: 50 INJECTION, SOLUTION INTRAVENOUS at 14:14

## 2022-06-13 RX ADMIN — SUGAMMADEX 250 MG: 100 INJECTION, SOLUTION INTRAVENOUS at 16:20

## 2022-06-13 RX ADMIN — ROCURONIUM BROMIDE 45 MG: 50 INJECTION, SOLUTION INTRAVENOUS at 14:00

## 2022-06-13 RX ADMIN — SODIUM CHLORIDE, PRESERVATIVE FREE 10 ML: 5 INJECTION INTRAVENOUS at 00:35

## 2022-06-13 RX ADMIN — MEROPENEM 1000 MG: 1 INJECTION, POWDER, FOR SOLUTION INTRAVENOUS at 09:14

## 2022-06-13 RX ADMIN — DEXAMETHASONE SODIUM PHOSPHATE 8 MG: 4 INJECTION, SOLUTION INTRAMUSCULAR; INTRAVENOUS at 13:58

## 2022-06-13 RX ADMIN — POTASSIUM CHLORIDE, DEXTROSE MONOHYDRATE AND SODIUM CHLORIDE: 224; 5; 450 INJECTION, SOLUTION INTRAVENOUS at 01:12

## 2022-06-13 RX ADMIN — POTASSIUM CHLORIDE, DEXTROSE MONOHYDRATE AND SODIUM CHLORIDE: 224; 5; 450 INJECTION, SOLUTION INTRAVENOUS at 10:54

## 2022-06-13 RX ADMIN — ONDANSETRON 4 MG: 2 INJECTION INTRAMUSCULAR; INTRAVENOUS at 15:17

## 2022-06-13 RX ADMIN — ROCURONIUM BROMIDE 5 MG: 50 INJECTION, SOLUTION INTRAVENOUS at 13:52

## 2022-06-13 RX ADMIN — SODIUM CHLORIDE: 9 INJECTION, SOLUTION INTRAVENOUS at 16:33

## 2022-06-13 RX ADMIN — MIDAZOLAM 2 MG: 1 INJECTION INTRAMUSCULAR; INTRAVENOUS at 13:44

## 2022-06-13 RX ADMIN — SODIUM CHLORIDE, PRESERVATIVE FREE 5 ML: 5 INJECTION INTRAVENOUS at 09:15

## 2022-06-13 RX ADMIN — ENOXAPARIN SODIUM 30 MG: 100 INJECTION SUBCUTANEOUS at 21:15

## 2022-06-13 RX ADMIN — MEROPENEM 1000 MG: 1 INJECTION, POWDER, FOR SOLUTION INTRAVENOUS at 18:21

## 2022-06-13 RX ADMIN — ROCURONIUM BROMIDE 20 MG: 50 INJECTION, SOLUTION INTRAVENOUS at 14:45

## 2022-06-13 RX ADMIN — LIDOCAINE HYDROCHLORIDE 100 MG: 20 INJECTION, SOLUTION EPIDURAL; INFILTRATION; INTRACAUDAL; PERINEURAL at 13:52

## 2022-06-13 RX ADMIN — ACETAMINOPHEN 1000 MG: 500 TABLET ORAL at 00:03

## 2022-06-13 RX ADMIN — FENTANYL CITRATE 100 MCG: 50 INJECTION, SOLUTION INTRAMUSCULAR; INTRAVENOUS at 13:52

## 2022-06-13 RX ADMIN — HYDROMORPHONE HYDROCHLORIDE 0.5 MG: 1 INJECTION, SOLUTION INTRAMUSCULAR; INTRAVENOUS; SUBCUTANEOUS at 21:19

## 2022-06-13 RX ADMIN — MEROPENEM 1000 MG: 1 INJECTION, POWDER, FOR SOLUTION INTRAVENOUS at 00:03

## 2022-06-13 RX ADMIN — HYDROMORPHONE HYDROCHLORIDE 0.5 MG: 1 INJECTION, SOLUTION INTRAMUSCULAR; INTRAVENOUS; SUBCUTANEOUS at 17:04

## 2022-06-13 RX ADMIN — SUCCINYLCHOLINE CHLORIDE 120 MG: 20 INJECTION, SOLUTION INTRAMUSCULAR; INTRAVENOUS at 13:52

## 2022-06-13 RX ADMIN — METHOCARBAMOL 500 MG: 100 INJECTION, SOLUTION INTRAMUSCULAR; INTRAVENOUS at 06:11

## 2022-06-13 ASSESSMENT — PAIN DESCRIPTION - FREQUENCY
FREQUENCY: CONTINUOUS
FREQUENCY: CONTINUOUS

## 2022-06-13 ASSESSMENT — PAIN DESCRIPTION - DESCRIPTORS
DESCRIPTORS: CRAMPING;ACHING
DESCRIPTORS: ACHING
DESCRIPTORS: SHARP
DESCRIPTORS: CRAMPING;ACHING
DESCRIPTORS: SHOOTING;PRESSURE
DESCRIPTORS: SHARP

## 2022-06-13 ASSESSMENT — PAIN SCALES - GENERAL
PAINLEVEL_OUTOF10: 4
PAINLEVEL_OUTOF10: 7
PAINLEVEL_OUTOF10: 7
PAINLEVEL_OUTOF10: 3
PAINLEVEL_OUTOF10: 7
PAINLEVEL_OUTOF10: 4
PAINLEVEL_OUTOF10: 7

## 2022-06-13 ASSESSMENT — PAIN DESCRIPTION - PAIN TYPE
TYPE: SURGICAL PAIN

## 2022-06-13 ASSESSMENT — PAIN - FUNCTIONAL ASSESSMENT
PAIN_FUNCTIONAL_ASSESSMENT: PREVENTS OR INTERFERES WITH MANY ACTIVE NOT PASSIVE ACTIVITIES
PAIN_FUNCTIONAL_ASSESSMENT: PREVENTS OR INTERFERES SOME ACTIVE ACTIVITIES AND ADLS
PAIN_FUNCTIONAL_ASSESSMENT: PREVENTS OR INTERFERES WITH MANY ACTIVE NOT PASSIVE ACTIVITIES

## 2022-06-13 ASSESSMENT — PAIN DESCRIPTION - ONSET
ONSET: ON-GOING
ONSET: ON-GOING

## 2022-06-13 ASSESSMENT — PAIN DESCRIPTION - ORIENTATION
ORIENTATION: MID

## 2022-06-13 ASSESSMENT — PAIN DESCRIPTION - LOCATION
LOCATION: ABDOMEN
LOCATION: ABDOMEN;INCISION
LOCATION: ABDOMEN
LOCATION: ABDOMEN;INCISION
LOCATION: ABDOMEN

## 2022-06-13 ASSESSMENT — LIFESTYLE VARIABLES: SMOKING_STATUS: 0

## 2022-06-13 NOTE — PROGRESS NOTES
GENERAL SURGERY  DAILY PROGRESS NOTE  6/13/2022    Subjective:  No new complaints or overnight events. Bowels are moving. Patient is ambulating. No nausea or vomiting    Objective:  BP (!) 148/96   Pulse 70   Temp 98 °F (36.7 °C)   Resp 18   Ht 5' 7\" (1.702 m)   Wt 254 lb (115.2 kg)   SpO2 99%   BMI 39.78 kg/m²     General Appearance:  awake, alert, oriented, in no acute distress  Skin:  Skin color, texture, turgor normal  Head/face:  NCAT  Eyes:  No gross abnormalities. Sclera nonicteric  Lungs/Chest:  Normal expansion. No respiratory distress. On room air  Heart: Warm throughout. Regular rate   Abdomen:  Soft, mildly tender, mildly distended. Distension significantly improved over the weekend    Extremities: Extremities warm to touch, pink, with no edema. I have personally reviewed all relevant labs and imaging. Persistent leukocytosis    Assessment/Plan:  50 y.o. male with complicated diverticulitis with likely stricture. - antibiotics per ID  - NPO for now  - IVF  - pain/nausea control prn  - ambulate    Electronically signed by Maty Berrios DO on 6/13/2022 at 7:52 AM     Pt seen and examined; his father is at bedside  Labs/imaging reviewed  He continues to feel ill overall with what he describes as not much improvement  Persistent abscess in pelvis that wasn't originally noted on his first ct when he came in  ID input appreciated  Smoldering diverticulitis not responding to iv abx  Secondary to not responding to conservative mgmt with IV abx, persistent leukocytosis, and persistent pelvic abscess not amenable to IR drainage, plan for exploratory lap today with poss ostomy  Lengthy discussion had with patient and his father explaining the r/b/a to surgery to include, but not limited to, bleeding , infection, injury to surrounding structures, all questions answered, informed consent on chart    Terry Ham MD  Minimally Invasive General Surgery and Endoscopy  [address] [facility].  [address]  F: [phone]    Electronically signed by Linda Acevedo MD on 6/13/2022 at 12:38 PM

## 2022-06-13 NOTE — PROGRESS NOTES
Report called to floor RN  Patient resting comfortably with eyes closed and responds to verbal stimulation    Abdominal dressing dry intact, LUC suction compressed, colostomy intact.     VSS as charted

## 2022-06-13 NOTE — BRIEF OP NOTE
Brief Postoperative Note      Patient: Henna Cannon  YOB: 1975  MRN: [id number]    Date of Procedure: 6/13/2022    Pre-Op Diagnosis: Acute complex diverticulitis with abscess not amenable to IR drainage, smoldering diverticulitis, abnormal CT scan    Post-Op Diagnosis: Same       Procedure(s):  1. EXPLORATORY LAPAROTOMY  2. MOBILIZATION OF SPLENIC FLEXURE  3. OPEN LEFT HEMICOLECTOMY  4. TRANSVERSE END COLOSTOMY  5. APPENDECTOMY  6. PARTIAL OMENTECTOMY  7.  PRIMARY REPAIR OF UMBILICAL HERNIA    Surgeon(s):  Faye Dias MD    Assistant:  Resident: Amarilys Owens MD    Anesthesia: General    Estimated Blood Loss (mL): 736     Complications: None    Specimens:   ID Type Source Tests Collected by Time Destination   1 : PERITONEAL FLUID Body Fluid Fluid CULTURE, FUNGUS, GRAM STAIN, CULTURE, BODY FLUID, CULTURE WITH SMEAR, ACID FAST Frankey Flies, MD 6/13/2022 1419    2 : PELVIC ABSCESS Body Fluid Fluid CULTURE, FUNGUS, GRAM STAIN, CULTURE, BODY FLUID, CULTURE WITH SMEAR, ACID FAST Frankey Flies, MD 6/13/2022 1421    A : LEFT COLON Tissue Colon SURGICAL PATHOLOGY Faye Dias MD 6/13/2022 1455    B : [address], MD 6/13/2022 1456    C : APPENDIX Tissue Appendix SURGICAL PATHOLOGY Faye Dias MD 6/13/2022 1510        Implants:  * No implants in log *      Drains:   Closed/Suction Drain RUQ Bulb (Active)       Colostomy LUQ (Active)       Urinary Catheter 2 Way (Active)       [REMOVED] NG/OG/NJ/NE Tube Nasogastric 16 fr Left nostril (Removed)   Surrounding Skin Clean, dry & intact 06/10/22 0815   Securement device Tape 06/10/22 0815   Status Clamped 06/10/22 0815   Placement Verified X-Ray (Initial) 06/08/22 0951   Drainage Appearance Brown 06/09/22 1610   Free Water/Flush (mL) 100 mL 06/09/22 1610   Output (mL) 150 ml 06/09/22 0451   Residual Volume (ml) 0 ml 06/08/22 2137       Findings: significant inflammation of the small bowel and peritoneum with abscess cavity in the RLQ into the pelvis involving the appendix and sigmoid colon. No evidence of feculent peritonitis. Bowel with fibrinous exudate. Severely inflamed sigmoid colon extending up to the splenic flexure. Ischemic omentum removed. Appendix densely adhered to sigmoid and removed as well. Cultures taken of abscess and peritoneal fluid. Sigmoid colon with dense, firm area of inflammation and adhered to anterior pelvic structures. Left ureter protected. Splenic flexure mobilized secondary to it's involvement. Distal staple line at normal appearing rectum and proximal staple line at normal appearing distal transverse colon. Spleen protected during flexure mobilization. Copious irrigation. Rectal stump tagged with 2-0 prolene and staple line oversewn. 19F drain left in pelvis. Fascia closed and skin left open with wet to dry dressings secondary to dirty case. Transverse end colostomy fashioned w/o complication. Rectum irrigated out at conclusion of case. Discussed with family.      Electronically signed by Terese Hood MD on 6/13/2022 at 4:39 PM

## 2022-06-13 NOTE — CARE COORDINATION
Social Work discharge planning   Sw met with pt, who is anticipating surgery Exp Lap today. Pt advised plan remains home alone, but he has local family support as needed. Pt said he prefers to use [facility] PCP is Dr Contreras Nicely.    Electronically signed by Evan Quinones on 6/13/2022 at 11:36 AM

## 2022-06-13 NOTE — PROGRESS NOTES
----- Message from Massiel Medina PharmD sent at 6/25/2019 10:31 AM CDT -----  Please let Tanmay know that his magnesium was a little low, therefore I would recommend restarting a low dose of a magnesium supplement.   [address] [place] [address] [facility]  Progress Note    SUBJECTIVE:  Chief Complaint   Patient presents with    Abdominal Pain     constipation x 1 week, bloating. Patient is tolerating medications. No reported adverse drug reactions. No nausea, vomiting, diarrhea. Laying in bed, feels lethargic  Having abdominal pain especially on the L side  T: 99 this AM    Review of systems:  As stated above in the chief complaint, otherwise negative. Medications:  Scheduled Meds:   meropenem  1,000 mg IntraVENous Q8H    fluconazole  200 mg Oral Daily    sodium chloride flush  5-40 mL IntraVENous 2 times per day    acetaminophen  1,000 mg Oral q8h    methocarbamol IVPB  500 mg IntraVENous Q8H    enoxaparin  30 mg SubCUTAneous BID     Continuous Infusions:   dextrose 5% and 0.45% NaCl with KCl 30 mEq 100 mL/hr at 22 1054    sodium chloride       PRN Meds:sodium chloride flush, sodium chloride, fentanNYL, HYDROmorphone, labetalol **OR** hydrALAZINE, ondansetron, HYDROmorphone    OBJECTIVE:  BP (!) 140/98   Pulse 65   Temp 99 °F (37.2 °C) (Oral)   Resp 16   Ht 5' 7\" (1.702 m)   Wt 254 lb (115.2 kg)   SpO2 98%   BMI 39.78 kg/m²   Temp  Av.5 °F (36.9 °C)  Min: 98 °F (36.7 °C)  Max: 99 °F (37.2 °C)  Constitutional: The patient is awake, alert, and oriented. Laying in bed, feels exhausted  Skin: Warm and dry. No rashes were noted. HEENT: Round and reactive pupils. Moist mucous membranes. No ulcerations or thrush. Neck: Supple to movements. Chest: No use of accessory muscles to breathe. Symmetrical expansion. No wheezing, crackles or rhonchi. Cardiovascular: S1 and S2 are rhythmic and regular. No murmurs appreciated. Abdomen: Positive bowel sounds to auscultation. Benign to palpation. No masses felt. No hepatosplenomegaly. Abdominal pain  Extremities: No clubbing, no cyanosis, no edema.   Lines: peripheral    Laboratory and Tests Review:  Lab Results   Component Value Date    WBC 14. 2 (H) 06/13/2022    WBC 15.7 (H) 06/12/2022    WBC 14.9 (H) 06/12/2022    HGB 12.7 06/13/2022    HCT 40.0 06/13/2022    MCV 87.1 06/13/2022     06/13/2022     Lab Results   Component Value Date    NEUTROABS 11.93 (H) 06/13/2022    NEUTROABS 11.92 (H) 06/12/2022    NEUTROABS 8.06 (H) 06/11/2022     No results found for: [facility]  Lab Results   Component Value Date    ALT 49 (H) 06/05/2022    AST 35 06/05/2022    ALKPHOS 125 06/05/2022    BILITOT 0.7 06/05/2022     Lab Results   Component Value Date     06/13/2022    K 4.2 06/13/2022    K 3.7 06/05/2022     06/13/2022    CO2 26 06/13/2022    BUN 6 06/13/2022    CREATININE 1.0 06/13/2022    CREATININE 0.8 06/12/2022    CREATININE 0.9 06/11/2022    GFRAA >60 06/13/2022    LABGLOM >60 06/13/2022    GLUCOSE 118 06/13/2022    PROT 7.2 06/05/2022    LABALBU 3.7 06/05/2022    CALCIUM 8.6 06/13/2022    BILITOT 0.7 06/05/2022    ALKPHOS 125 06/05/2022    AST 35 06/05/2022    ALT 49 06/05/2022     Lab Results   Component Value Date    CRP 29.6 (H) 06/08/2022     Lab Results   Component Value Date    SEDRATE 62 (H) 06/08/2022     Radiology:  Reviewed    Microbiology:   Blood Cultures 6/12/22: pending  MRSA nares: pending    ASSESSMENT:  Diverticulitis with pelvic abscess  Leukocytosis related to undrained abscess    PLAN:  Continue Merrem and Diflucan pending cultures  For Ex-Lap today  Check final cultures  Monitor labs    MARBIN Morin CNP  11:56 AM  6/13/2022     Pt seen and examined. Above discussed agree with advanced practice nurse. Labs, cultures, and radiographs reviewed. Face to Face encounter occurred. Changes made as necessary.      Noemy Rangel MD

## 2022-06-13 NOTE — ANESTHESIA PRE PROCEDURE
Department of Anesthesiology  Preprocedure Note       Name:  Yinka Bhatia   Age:  50 y.o.  :  1975                                          MRN:  [id number]         Date:  2022      Surgeon: Fede Orellana):  Hector Quinones MD    Procedure: Procedure(s):  EXPLORATORY LAPAROTOMY POSSIBLE BOWEL RESECTION POSSIBLE OSTOMY    Medications prior to admission:   Prior to Admission medications    Medication Sig Start Date End Date Taking? Authorizing Provider   escitalopram (LEXAPRO) 20 MG tablet Take 20 mg by mouth daily    Historical Provider, MD       Current medications:    No current facility-administered medications for this visit. No current outpatient medications on file.      Facility-Administered Medications Ordered in Other Visits   Medication Dose Route Frequency Provider Last Rate Last Admin    meropenem (MERREM) 1,000 mg in sodium chloride 0.9 % 100 mL IVPB (mini-bag)  1,000 mg IntraVENous Q8H Timi Carrillo MD   Stopped at 22 0944    fluconazole (DIFLUCAN) tablet 200 mg  200 mg Oral Daily Timi Carrillo MD   200 mg at 22 1534    dextrose 5 % and 0.45 % NaCl with KCl 30 mEq infusion   IntraVENous Continuous Santos Sorto  mL/hr at 22 1054 New Bag at 22 1054    sodium chloride flush 0.9 % injection 5-40 mL  5-40 mL IntraVENous 2 times per day Joe ROSEMARY Catracho, DO   5 mL at 22 0915    sodium chloride flush 0.9 % injection 5-40 mL  5-40 mL IntraVENous PRN Joe T Catracho, DO   10 mL at 22 0035    0.9 % sodium chloride infusion   IntraVENous PRN Joe T Catracho, DO        fentaNYL (SUBLIMAZE) injection 25 mcg  25 mcg IntraVENous Q5 Min PRN Joe T Catracho, DO        HYDROmorphone (DILAUDID) injection 0.5 mg  0.5 mg IntraVENous Q5 Min PRN Joe T Catracho, DO        labetalol (NORMODYNE;TRANDATE) injection 5 mg  5 mg IntraVENous Q15 Min PRN Joe T Catracho, DO        Or    hydrALAZINE (APRESOLINE) injection 5 mg  5 mg IntraVENous Q15 Min PRN Joe Hayden DO        acetaminophen (TYLENOL) tablet 1,000 mg  1,000 mg Oral q8h Daryn Almonte MD   1,000 mg at 06/13/22 0003    ondansetron (ZOFRAN) injection 4 mg  4 mg IntraVENous Q6H PRN Daryn Almonte MD   4 mg at 06/07/22 1634    methocarbamol (ROBAXIN) 500 mg in dextrose 5 % 100 mL IVPB  500 mg IntraVENous Q8H Daryn Almonte MD   Stopped at 06/13/22 0915    enoxaparin Sodium (LOVENOX) injection 30 mg  30 mg SubCUTAneous BID Daryn Almonte MD   30 mg at 06/12/22 2106    HYDROmorphone (DILAUDID) injection 0.25 mg  0.25 mg IntraVENous Q3H PRN Daryn Almonte MD   0.25 mg at 06/08/22 0447       Allergies:  No Known Allergies    Problem List:    Patient Active Problem List   Diagnosis Code    Acute bilateral thoracic back pain M54.6    Thoracic spine tumor D49.2    Diverticulitis K57.92       Past Medical History:        Diagnosis Date    Depression     Hypertension        Past Surgical History:        Procedure Laterality Date    COLONOSCOPY N/A 6/8/2022    COLONOSCOPY DIAGNOSTIC performed by Ary Pickett MD at [facility], [place]         Social History:    Social History     Tobacco Use    Smoking status: Never Smoker    Smokeless tobacco: Never Used   Substance Use Topics    Alcohol use: Yes     Alcohol/week: 3.0 standard drinks     Types: 3 Glasses of wine per week                                Counseling given: Not Answered      Vital Signs (Current): There were no vitals filed for this visit.                                            BP Readings from Last 3 Encounters:   06/13/22 (!) 140/98   01/18/21 (!) 170/11   01/28/17 (!) 157/107       NPO Status:                                                                                 BMI:   Wt Readings from Last 3 Encounters:   06/13/22 254 lb (115.2 kg)   01/18/21 203 lb (92.1 kg)   01/28/17 203 lb (92.1 kg)     There is no height or weight on file to calculate BMI.    CBC:   Lab Results   Component Value Date WBC 14.2 06/13/2022    RBC 4.59 06/13/2022    HGB 12.7 06/13/2022    HCT 40.0 06/13/2022    MCV 87.1 06/13/2022    RDW 15.4 06/13/2022     06/13/2022       CMP:   Lab Results   Component Value Date     06/13/2022    K 4.2 06/13/2022    K 3.7 06/05/2022     06/13/2022    CO2 26 06/13/2022    BUN 6 06/13/2022    CREATININE 1.0 06/13/2022    GFRAA >60 06/13/2022    LABGLOM >60 06/13/2022    GLUCOSE 118 06/13/2022    PROT 7.2 06/05/2022    CALCIUM 8.6 06/13/2022    BILITOT 0.7 06/05/2022    ALKPHOS 125 06/05/2022    AST 35 06/05/2022    ALT 49 06/05/2022       POC Tests: No results for input(s): POCGLU, POCNA, POCK, POCCL, POCBUN, POCHEMO, POCHCT in the last 72 hours. Coags:   Lab Results   Component Value Date    PROTIME 11.2 01/23/2017    INR 1.0 01/23/2017       HCG (If Applicable): No results found for: PREGTESTUR, PREGSERUM, HCG, HCGQUANT     ABGs: No results found for: PHART, PO2ART, HWW2IOJ, DFW7EJS, BEART, E0OZHBIT     Type & Screen (If Applicable):  No results found for: LABABO, LABRH    Drug/Infectious Status (If Applicable):  No results found for: HIV, HEPCAB    COVID-19 Screening (If Applicable): No results found for: COVID19      Impression   Distended fluid-filled loops of small bowel throughout the abdomen and pelvis   with distension is well seen of the stomach to suggest ileus. Kennth Berwyn is air   identified throughout the colon with no evidence of transition.  Improvement   seen in the wall thickening of the sigmoid colon however with development of   a small abscess between the bladder and rectum just superior to the prostate.    The amount of fluid within the abdomen and pelvis is similar when compared to   the prior examination.               Anesthesia Evaluation  Patient summary reviewed and Nursing notes reviewed no history of anesthetic complications:   Airway: Mallampati: II  TM distance: >3 FB   Neck ROM: limited  Mouth opening: > = 3 FB   Dental:          Pulmonary:   (+) decreased breath sounds     (-) sleep apnea and not a current smoker                           Cardiovascular:  Exercise tolerance: good (>4 METS),   (+) hypertension: moderate,     (-) past MI, CAD, CABG/stent, dysrhythmias,  angina and murmur    ECG reviewed  Rhythm: regular  Rate: normal           Beta Blocker:  Not on Beta Blocker      ROS comment: EKG:  Normal sinus rhythm  Anterior infarct, age undetermined  Abnormal ECG  No previous ECGs available     Neuro/Psych:   (+) psychiatric history:depression/anxiety    (-) seizures, TIA and CVA           GI/Hepatic/Renal:        (-) hepatitisMorbid obesity: BMI 37 kg/m2. ROS comment: Diverticular disease  Bowel obstruction with draining NG with underlying leukocytosis and left shift. Endo/Other:    (+) : arthritis (Chronic back pain): OA., malignancy/cancer (Thoracic spine tumor). (-) diabetes mellitus, blood dyscrasia        Pt had no PAT visit       Abdominal:   (+) obese,     Abdomen: rigid and tender. Vascular: negative vascular ROS. - DVT and PE. Other Findings:             Anesthesia Plan      general     ASA 3 - emergent       Induction: intravenous. MIPS: Postoperative opioids intended and Prophylactic antiemetics administered. Anesthetic plan and risks discussed with patient. Plan discussed with CRNA.                     Ivette Brennan MD   6/13/2022        Note reviewed and agree with plan MARBIN López - CRNA

## 2022-06-13 NOTE — PROGRESS NOTES
Treatment consent is signed and placed in soft chart.  Electronically signed by Nate Medel RN on 6/13/2022 at 9:16 AM

## 2022-06-13 NOTE — PATIENT CARE CONFERENCE
P Quality Flow/Interdisciplinary Rounds Progress Note        Quality Flow Rounds held on June 13, 2022    Disciplines Attending:  Bedside Nurse, ,  and Nursing Unit 2 Brooke Munguia was admitted on 6/5/2022 11:55 AM    Anticipated Discharge Date:       Disposition:    Boubacar Score:  Boubacar Scale Score: 21    Readmission Risk              Risk of Unplanned Readmission:  10           Discussed patient goal for the day, patient clinical progression, and barriers to discharge.   The following Goal(s) of the Day/Commitment(s) have been identified:  Diagnostics - Report Results OR      Nicholas Bonner RN  June 13, 2022

## 2022-06-14 LAB
ANION GAP SERPL CALCULATED.3IONS-SCNC: 13 MMOL/L (ref 7–16)
ATYPICAL LYMPHOCYTE RELATIVE PERCENT: 1 % (ref 0–4)
BASOPHILS ABSOLUTE: 0 E9/L (ref 0–0.2)
BASOPHILS RELATIVE PERCENT: 0 % (ref 0–2)
BUN BLDV-MCNC: 13 MG/DL (ref 6–20)
CALCIUM SERPL-MCNC: 8.4 MG/DL (ref 8.6–10.2)
CHLORIDE BLD-SCNC: 98 MMOL/L (ref 98–107)
CO2: 22 MMOL/L (ref 22–29)
CREAT SERPL-MCNC: 1.1 MG/DL (ref 0.7–1.2)
EOSINOPHILS ABSOLUTE: 0 E9/L (ref 0.05–0.5)
EOSINOPHILS RELATIVE PERCENT: 0 % (ref 0–6)
GFR AFRICAN AMERICAN: >60
GFR NON-AFRICAN AMERICAN: >60 ML/MIN/1.73
GLUCOSE BLD-MCNC: 227 MG/DL (ref 74–99)
GRAM STAIN ORDERABLE: NORMAL
GRAM STAIN ORDERABLE: NORMAL
HCT VFR BLD CALC: 45.1 % (ref 37–54)
HEMOGLOBIN: 14 G/DL (ref 12.5–16.5)
LYMPHOCYTES ABSOLUTE: 1.11 E9/L (ref 1.5–4)
LYMPHOCYTES RELATIVE PERCENT: 3 % (ref 20–42)
MCH RBC QN AUTO: 27.3 PG (ref 26–35)
MCHC RBC AUTO-ENTMCNC: 31 % (ref 32–34.5)
MCV RBC AUTO: 87.9 FL (ref 80–99.9)
METAMYELOCYTES RELATIVE PERCENT: 1 % (ref 0–1)
METER GLUCOSE: 100 MG/DL (ref 74–99)
METER GLUCOSE: 107 MG/DL (ref 74–99)
METER GLUCOSE: 129 MG/DL (ref 74–99)
METER GLUCOSE: 131 MG/DL (ref 74–99)
MONOCYTES ABSOLUTE: 0.28 E9/L (ref 0.1–0.95)
MONOCYTES RELATIVE PERCENT: 1 % (ref 2–12)
MYELOCYTE PERCENT: 1 % (ref 0–0)
NEUTROPHILS ABSOLUTE: 26.32 E9/L (ref 1.8–7.3)
NEUTROPHILS RELATIVE PERCENT: 93 % (ref 43–80)
PDW BLD-RTO: 15.6 FL (ref 11.5–15)
PHOSPHORUS: 3.9 MG/DL (ref 2.5–4.5)
PLATELET # BLD: 558 E9/L (ref 130–450)
PMV BLD AUTO: 9.6 FL (ref 7–12)
POTASSIUM REFLEX MAGNESIUM: 4.8 MMOL/L (ref 3.5–5)
RBC # BLD: 5.13 E12/L (ref 3.8–5.8)
RBC # BLD: NORMAL 10*6/UL
SODIUM BLD-SCNC: 133 MMOL/L (ref 132–146)
WBC # BLD: 27.7 E9/L (ref 4.5–11.5)

## 2022-06-14 PROCEDURE — 1200000000 HC SEMI PRIVATE

## 2022-06-14 PROCEDURE — 80048 BASIC METABOLIC PNL TOTAL CA: CPT

## 2022-06-14 PROCEDURE — 6360000002 HC RX W HCPCS: Performed by: STUDENT IN AN ORGANIZED HEALTH CARE EDUCATION/TRAINING PROGRAM

## 2022-06-14 PROCEDURE — 85025 COMPLETE CBC W/AUTO DIFF WBC: CPT

## 2022-06-14 PROCEDURE — 36415 COLL VENOUS BLD VENIPUNCTURE: CPT

## 2022-06-14 PROCEDURE — 2580000003 HC RX 258: Performed by: STUDENT IN AN ORGANIZED HEALTH CARE EDUCATION/TRAINING PROGRAM

## 2022-06-14 PROCEDURE — 6360000002 HC RX W HCPCS: Performed by: SURGERY

## 2022-06-14 PROCEDURE — 84100 ASSAY OF PHOSPHORUS: CPT

## 2022-06-14 PROCEDURE — 6360000002 HC RX W HCPCS: Performed by: SPECIALIST

## 2022-06-14 PROCEDURE — C9113 INJ PANTOPRAZOLE SODIUM, VIA: HCPCS | Performed by: STUDENT IN AN ORGANIZED HEALTH CARE EDUCATION/TRAINING PROGRAM

## 2022-06-14 PROCEDURE — A4216 STERILE WATER/SALINE, 10 ML: HCPCS | Performed by: STUDENT IN AN ORGANIZED HEALTH CARE EDUCATION/TRAINING PROGRAM

## 2022-06-14 PROCEDURE — 82962 GLUCOSE BLOOD TEST: CPT

## 2022-06-14 PROCEDURE — 2700000000 HC OXYGEN THERAPY PER DAY

## 2022-06-14 RX ORDER — FLUCONAZOLE 2 MG/ML
400 INJECTION, SOLUTION INTRAVENOUS EVERY 24 HOURS
Status: DISCONTINUED | OUTPATIENT
Start: 2022-06-14 | End: 2022-06-16

## 2022-06-14 RX ORDER — INSULIN LISPRO 100 [IU]/ML
0-6 INJECTION, SOLUTION INTRAVENOUS; SUBCUTANEOUS EVERY 4 HOURS
Status: DISCONTINUED | OUTPATIENT
Start: 2022-06-14 | End: 2022-06-15

## 2022-06-14 RX ORDER — DEXTROSE MONOHYDRATE 50 MG/ML
100 INJECTION, SOLUTION INTRAVENOUS PRN
Status: DISCONTINUED | OUTPATIENT
Start: 2022-06-14 | End: 2022-06-20 | Stop reason: HOSPADM

## 2022-06-14 RX ADMIN — SODIUM CHLORIDE, PRESERVATIVE FREE 40 MG: 5 INJECTION INTRAVENOUS at 18:24

## 2022-06-14 RX ADMIN — METHOCARBAMOL 500 MG: 100 INJECTION, SOLUTION INTRAMUSCULAR; INTRAVENOUS at 02:20

## 2022-06-14 RX ADMIN — MEROPENEM 1000 MG: 1 INJECTION, POWDER, FOR SOLUTION INTRAVENOUS at 02:55

## 2022-06-14 RX ADMIN — SODIUM CHLORIDE, POTASSIUM CHLORIDE, SODIUM LACTATE AND CALCIUM CHLORIDE: 600; 310; 30; 20 INJECTION, SOLUTION INTRAVENOUS at 02:17

## 2022-06-14 RX ADMIN — HYDROMORPHONE HYDROCHLORIDE 0.5 MG: 1 INJECTION, SOLUTION INTRAMUSCULAR; INTRAVENOUS; SUBCUTANEOUS at 22:03

## 2022-06-14 RX ADMIN — HYDROMORPHONE HYDROCHLORIDE 0.5 MG: 1 INJECTION, SOLUTION INTRAMUSCULAR; INTRAVENOUS; SUBCUTANEOUS at 18:24

## 2022-06-14 RX ADMIN — SODIUM CHLORIDE, PRESERVATIVE FREE 40 MG: 5 INJECTION INTRAVENOUS at 06:59

## 2022-06-14 RX ADMIN — HYDROMORPHONE HYDROCHLORIDE 0.5 MG: 1 INJECTION, SOLUTION INTRAMUSCULAR; INTRAVENOUS; SUBCUTANEOUS at 03:27

## 2022-06-14 RX ADMIN — SODIUM CHLORIDE, POTASSIUM CHLORIDE, SODIUM LACTATE AND CALCIUM CHLORIDE: 600; 310; 30; 20 INJECTION, SOLUTION INTRAVENOUS at 11:22

## 2022-06-14 RX ADMIN — HYDROMORPHONE HYDROCHLORIDE 0.5 MG: 1 INJECTION, SOLUTION INTRAMUSCULAR; INTRAVENOUS; SUBCUTANEOUS at 00:27

## 2022-06-14 RX ADMIN — HYDROMORPHONE HYDROCHLORIDE 0.5 MG: 1 INJECTION, SOLUTION INTRAMUSCULAR; INTRAVENOUS; SUBCUTANEOUS at 07:00

## 2022-06-14 RX ADMIN — METHOCARBAMOL 500 MG: 100 INJECTION, SOLUTION INTRAMUSCULAR; INTRAVENOUS at 10:25

## 2022-06-14 RX ADMIN — MEROPENEM 1000 MG: 1 INJECTION, POWDER, FOR SOLUTION INTRAVENOUS at 18:23

## 2022-06-14 RX ADMIN — FLUCONAZOLE 400 MG: 2 INJECTION, SOLUTION INTRAVENOUS at 16:21

## 2022-06-14 RX ADMIN — METHOCARBAMOL 500 MG: 100 INJECTION, SOLUTION INTRAMUSCULAR; INTRAVENOUS at 18:23

## 2022-06-14 RX ADMIN — SODIUM CHLORIDE, POTASSIUM CHLORIDE, SODIUM LACTATE AND CALCIUM CHLORIDE: 600; 310; 30; 20 INJECTION, SOLUTION INTRAVENOUS at 19:01

## 2022-06-14 RX ADMIN — HYDROMORPHONE HYDROCHLORIDE 0.5 MG: 1 INJECTION, SOLUTION INTRAMUSCULAR; INTRAVENOUS; SUBCUTANEOUS at 13:33

## 2022-06-14 RX ADMIN — MEROPENEM 1000 MG: 1 INJECTION, POWDER, FOR SOLUTION INTRAVENOUS at 10:22

## 2022-06-14 RX ADMIN — ENOXAPARIN SODIUM 30 MG: 100 INJECTION SUBCUTANEOUS at 11:16

## 2022-06-14 RX ADMIN — HYDROMORPHONE HYDROCHLORIDE 0.5 MG: 1 INJECTION, SOLUTION INTRAMUSCULAR; INTRAVENOUS; SUBCUTANEOUS at 10:22

## 2022-06-14 RX ADMIN — ENOXAPARIN SODIUM 30 MG: 100 INJECTION SUBCUTANEOUS at 21:32

## 2022-06-14 ASSESSMENT — PAIN DESCRIPTION - ONSET
ONSET: ON-GOING

## 2022-06-14 ASSESSMENT — PAIN - FUNCTIONAL ASSESSMENT
PAIN_FUNCTIONAL_ASSESSMENT: PREVENTS OR INTERFERES WITH MANY ACTIVE NOT PASSIVE ACTIVITIES

## 2022-06-14 ASSESSMENT — PAIN DESCRIPTION - PAIN TYPE
TYPE: SURGICAL PAIN

## 2022-06-14 ASSESSMENT — PAIN DESCRIPTION - ORIENTATION
ORIENTATION: MID
ORIENTATION: MID
ORIENTATION: MID;LOWER
ORIENTATION: MID

## 2022-06-14 ASSESSMENT — PAIN DESCRIPTION - DESCRIPTORS
DESCRIPTORS: ACHING;THROBBING;STABBING;TENDER
DESCRIPTORS: SHARP
DESCRIPTORS: ACHING;TENDER
DESCRIPTORS: SHARP

## 2022-06-14 ASSESSMENT — PAIN DESCRIPTION - LOCATION
LOCATION: ABDOMEN;INCISION
LOCATION: ABDOMEN
LOCATION: ABDOMEN;INCISION
LOCATION: ABDOMEN;INCISION
LOCATION: ABDOMEN
LOCATION: ABDOMEN;INCISION

## 2022-06-14 ASSESSMENT — PAIN SCALES - GENERAL
PAINLEVEL_OUTOF10: 4
PAINLEVEL_OUTOF10: 6
PAINLEVEL_OUTOF10: 6
PAINLEVEL_OUTOF10: 5
PAINLEVEL_OUTOF10: 4
PAINLEVEL_OUTOF10: 6
PAINLEVEL_OUTOF10: 7
PAINLEVEL_OUTOF10: 4
PAINLEVEL_OUTOF10: 3
PAINLEVEL_OUTOF10: 5
PAINLEVEL_OUTOF10: 6
PAINLEVEL_OUTOF10: 4

## 2022-06-14 ASSESSMENT — PAIN DESCRIPTION - FREQUENCY
FREQUENCY: CONTINUOUS

## 2022-06-14 NOTE — PROGRESS NOTES
GENERAL SURGERY  DAILY PROGRESS NOTE  6/14/2022    Subjective:  Feeling fine this morning, no major issues overnight. No nausea. Abdominal discomfort controlled well. SMI 1500. Colostomy with some blood in bag but no air or stool output yet. Objective:  BP (!) 138/102   Pulse 66   Temp 97.8 °F (36.6 °C) (Oral)   Resp 20   Ht 5' 7\" (1.702 m)   Wt 241 lb (109.3 kg)   SpO2 99%   BMI 37.75 kg/m²     General Appearance:  awake, alert, oriented, in no acute distress  Skin:  Skin color, texture, turgor normal  Head/face:  NCAT  Eyes:  No gross abnormalities. Sclera nonicteric  Lungs/Chest:  Normal expansion. No respiratory distress. Heart: Warm throughout. Regular rate   Abdomen:  Soft, appropriately tender, midline wound clean, ostomy pink with some blood in ostomy bag. Extremities: Extremities warm to touch, pink, with no edema. I have personally reviewed all relevant labs and imaging. Assessment/Plan:  50 y.o. male with complicated diverticulitis s/p exploratory laparotomy with left hemicolectomy, appendectomy with end colostomy 6/13    - antibiotics per ID  - continue NPO, NG to LIWS  - IVF  - pain/nausea control prn  - ambulate  - local wound care to midline - will plan on delayed primary closure soon    Electronically signed by Danika Saab MD on 6/14/2022 at 7:21 AM     Patient seen and examined at bedside agree with above. Patient doing well at this time. Postop day 1.

## 2022-06-14 NOTE — CARE COORDINATION
Social Work discharge planning   Noted pt has new ostomy. Sw offered pt list of c choices. He had no preference.  Ref made to [facility]ryn with [facility].  Electronically signed by Erwin Andersen on 6/14/2022 at 1:14 PM

## 2022-06-14 NOTE — HOME CARE
[facility] referral received, awaiting final orders. Spoke with patient and verified demographics. He is unsure which address he will return to at discharge, his home or his parents'. We will need to confirm service address. Will follow. Noel Dumont LPN [facility].

## 2022-06-14 NOTE — PROGRESS NOTES
Consult new end colostomy done yesterday  Awake and verbally appropriate  Sister present  Stoma red/pink  Large abd dressing in place  Initial educated done. Will continue to follow  D/w patient need to move in bed, ambulate, insprex etc  Teresa Varghese.  Kirk Petersen, CNS, Wound Care

## 2022-06-14 NOTE — PATIENT CARE CONFERENCE
[facility] Quality Flow/Interdisciplinary Rounds Progress Note        Quality Flow Rounds held on June 14, 2022    Disciplines Attending:  Bedside Nurse, ,  and Nursing Unit Leadership    Ozzy Gomez was admitted on 6/5/2022 11:55 AM    Anticipated Discharge Date:  Expected Discharge Date: 06/16/22    Disposition:    Boubacar Score:  Boubacar Scale Score: 21    Readmission Risk              Risk of Unplanned Readmission:  12           Discussed patient goal for the day, patient clinical progression, and barriers to discharge.   The following Goal(s) of the Day/Commitment(s) have been identified:  Diagnostics - Report Results      Kalyani Hartman RN  June 14, 2022

## 2022-06-14 NOTE — PROGRESS NOTES
[address] [place] [address] [facility]  Progress Note    SUBJECTIVE:  Chief Complaint   Patient presents with    Abdominal Pain     constipation x 1 week, bloating. Patient is tolerating medications. No reported adverse drug reactions. No nausea, vomiting, diarrhea. Laying in bed, feels better      Review of systems:  As stated above in the chief complaint, otherwise negative. Medications:  Scheduled Meds:   pantoprazole (PROTONIX) 40 mg injection  40 mg IntraVENous Q12H    insulin lispro  0-6 Units SubCUTAneous Q4H    fluconazole  400 mg IntraVENous Q24H    meropenem  1,000 mg IntraVENous Q8H    acetaminophen  1,000 mg Oral q8h    methocarbamol IVPB  500 mg IntraVENous Q8H    enoxaparin  30 mg SubCUTAneous BID     Continuous Infusions:   dextrose      lactated ringers 125 mL/hr at 22 1122     PRN Meds:glucose, dextrose bolus **OR** dextrose bolus, glucagon (rDNA), dextrose, HYDROmorphone **OR** [DISCONTINUED] HYDROmorphone, ondansetron    OBJECTIVE:  /88   Pulse 61   Temp 97.8 °F (36.6 °C) (Oral)   Resp 18   Ht 5' 7\" (1.702 m)   Wt 241 lb (109.3 kg)   SpO2 99%   BMI 37.75 kg/m²   Temp  Av.6 °F (36.4 °C)  Min: 97 °F (36.1 °C)  Max: 98.5 °F (36.9 °C)  Constitutional: The patient is awake, alert, and oriented. Laying in bed, feels better  Skin: Warm and dry. No rashes were noted. HEENT: Round and reactive pupils. Moist mucous membranes. No ulcerations or thrush. NG tube  Neck: Supple to movements. Chest: No use of accessory muscles to breathe. Symmetrical expansion. No wheezing, crackles or rhonchi. Cardiovascular: S1 and S2 are rhythmic and regular. No murmurs appreciated. Abdomen: Positive bowel sounds to auscultation. Benign to palpation. No masses felt. No hepatosplenomegaly.   ABD on the incision line, colostomy and LUC with serous sign  Extremities: No cyanosis or clubbing 2+ edema  Lines: peripheral    Laboratory and Tests Review:  Lab Results Component Value Date    WBC 27.7 (H) 06/14/2022    WBC 14.2 (H) 06/13/2022    WBC 15.7 (H) 06/12/2022    HGB 14.0 06/14/2022    HCT 45.1 06/14/2022    MCV 87.9 06/14/2022     (H) 06/14/2022     Lab Results   Component Value Date    NEUTROABS 26.32 (H) 06/14/2022    NEUTROABS 11.93 (H) 06/13/2022    NEUTROABS 11.92 (H) 06/12/2022     No results found for: CRPHS  Lab Results   Component Value Date    ALT 49 (H) 06/05/2022    AST 35 06/05/2022    ALKPHOS 125 06/05/2022    BILITOT 0.7 06/05/2022     Lab Results   Component Value Date     06/14/2022    K 4.8 06/14/2022    CL 98 06/14/2022    CO2 22 06/14/2022    BUN 13 06/14/2022    CREATININE 1.1 06/14/2022    CREATININE 1.0 06/13/2022    CREATININE 0.8 06/12/2022    GFRAA >60 06/14/2022    LABGLOM >60 06/14/2022    GLUCOSE 227 06/14/2022    PROT 7.2 06/05/2022    LABALBU 3.7 06/05/2022    CALCIUM 8.4 06/14/2022    BILITOT 0.7 06/05/2022    ALKPHOS 125 06/05/2022    AST 35 06/05/2022    ALT 49 06/05/2022     Lab Results   Component Value Date    CRP 29.6 (H) 06/08/2022     Lab Results   Component Value Date    SEDRATE 62 (H) 06/08/2022     Radiology:  Reviewed    Microbiology:   Blood Cultures 6/12/22: pending  MRSA nares: pending    ASSESSMENT:  · Diverticulitis with pelvic abscess  · Leukocytosis related to undrained abscess  · That is post exploratory lap; transverse and colostomy and appendectomy with hernia repair  · Cytosis    PLAN:  · Continue Merrem and Diflucan pending cultures  · Check final cultures  · Monitor labs    Janki Pavon MD  3:40 PM  6/14/2022

## 2022-06-14 NOTE — PROGRESS NOTES
Comprehensive Nutrition Assessment    Type and Reason for Visit:  Reassess    Nutrition Recommendations/Plan:     1. Consider TPN d/t altered GI structure/function and pt NPO/clears most days since admit       Malnutrition Assessment:  Malnutrition Status: At risk for malnutrition (Comment) (06/14/22 1045)    Context:  Acute Illness     Findings of the 6 clinical characteristics of malnutrition:  Energy Intake:  50% or less of estimated energy requirements for 5 or more days  Weight Loss:  No significant weight loss     Body Fat Loss:  No significant body fat loss     Muscle Mass Loss:  No significant muscle mass loss    Fluid Accumulation:  No significant fluid accumulation     Strength:  Not Performed    Nutrition Assessment:    Pt now s/p 6/13 exp lap with left hemicolectomy, appendectomy and colostomy d/t complicated diverticulitis. Note that delayed primary closure and will return to OR. Pt has had inadequate intake since admit. Recommend consider PN support 2/2 altered GI structure and function. Nutrition Related Findings:    colostomy w/o stool/gas out, distended abd w/hypo BS, NGT to LIS, A&Ox4, Wound Type: Surgical Incision       Current Nutrition Intake & Therapies:    Average Meal Intake: NPO  Average Supplements Intake: NPO  Diet NPO    Anthropometric Measures:  Height: 5' 7\" (170.2 cm)  Ideal Body Weight (IBW): 148 lbs (67 kg)    Admission Body Weight: 243 lb (110.2 kg)  Current Body Weight: 241 lb (109.3 kg), 162.8 % IBW.  Weight Source: Bed Scale (6/14)  Current BMI (kg/m2): 37.7  Usual Body Weight: 235 lb (106.6 kg) (per patient report)  % Weight Change (Calculated): 11.9  Weight Adjustment For: No Adjustment                 BMI Categories: Obese Class 2 (BMI 35.0 -39.9)    Estimated Daily Nutrient Needs:  Energy Requirements Based On: Kcal/kg  Weight Used for Energy Requirements: Admission  Energy (kcal/day): 0034-5016 kcals/d  Weight Used for Protein Requirements: Ideal  Protein (g/day):  gm/d  Method Used for Fluid Requirements: 1 ml/kcal  Fluid (ml/day):     Nutrition Diagnosis:   · Inadequate oral intake related to altered GI function as evidenced by NPO or clear liquid status due to medical condition      Nutrition Interventions:   Food and/or Nutrient Delivery: Continue NPO (Recommend consider TPN d/t inadequate intake and altered GI structure/function)  Nutrition Education/Counseling: Education needed (Colostomy Nutrition Guideline education prior to D/C)  Coordination of Nutrition Care: Continue to monitor while inpatient       Goals:  Previous Goal Met: No Progress toward Goal(s)  Goals: other (specify)  Specify Other Goals: Nutrition progression    Nutrition Monitoring and Evaluation:   Behavioral-Environmental Outcomes: None Identified  Food/Nutrient Intake Outcomes: Diet Advancement/Tolerance  Physical Signs/Symptoms Outcomes: GI Status,Biochemical Data,Fluid Status or Edema,Nutrition Focused Physical Findings,Skin,Weight    Discharge Planning:     Too soon to determine     Amy Herrera RD, CNSC, LD  Contact: [phone]'

## 2022-06-15 LAB
ANION GAP SERPL CALCULATED.3IONS-SCNC: 10 MMOL/L (ref 7–16)
BASOPHILS ABSOLUTE: 0.04 E9/L (ref 0–0.2)
BASOPHILS RELATIVE PERCENT: 0.2 % (ref 0–2)
BUN BLDV-MCNC: 14 MG/DL (ref 6–20)
CALCIUM SERPL-MCNC: 8.4 MG/DL (ref 8.6–10.2)
CHLORIDE BLD-SCNC: 100 MMOL/L (ref 98–107)
CO2: 27 MMOL/L (ref 22–29)
CREAT SERPL-MCNC: 1 MG/DL (ref 0.7–1.2)
EOSINOPHILS ABSOLUTE: 0.26 E9/L (ref 0.05–0.5)
EOSINOPHILS RELATIVE PERCENT: 1.3 % (ref 0–6)
GFR AFRICAN AMERICAN: >60
GFR NON-AFRICAN AMERICAN: >60 ML/MIN/1.73
GLUCOSE BLD-MCNC: 98 MG/DL (ref 74–99)
HCT VFR BLD CALC: 36.4 % (ref 37–54)
HEMOGLOBIN: 11.5 G/DL (ref 12.5–16.5)
IMMATURE GRANULOCYTES #: 0.48 E9/L
IMMATURE GRANULOCYTES %: 2.5 % (ref 0–5)
LYMPHOCYTES ABSOLUTE: 2.29 E9/L (ref 1.5–4)
LYMPHOCYTES RELATIVE PERCENT: 11.8 % (ref 20–42)
MAGNESIUM: 2.2 MG/DL (ref 1.6–2.6)
MCH RBC QN AUTO: 27.8 PG (ref 26–35)
MCHC RBC AUTO-ENTMCNC: 31.6 % (ref 32–34.5)
MCV RBC AUTO: 87.9 FL (ref 80–99.9)
METER GLUCOSE: 104 MG/DL (ref 74–99)
METER GLUCOSE: 105 MG/DL (ref 74–99)
METER GLUCOSE: 105 MG/DL (ref 74–99)
METER GLUCOSE: 110 MG/DL (ref 74–99)
METER GLUCOSE: 99 MG/DL (ref 74–99)
MONOCYTES ABSOLUTE: 1.01 E9/L (ref 0.1–0.95)
MONOCYTES RELATIVE PERCENT: 5.2 % (ref 2–12)
MRSA CULTURE ONLY: NORMAL
NEUTROPHILS ABSOLUTE: 15.27 E9/L (ref 1.8–7.3)
NEUTROPHILS RELATIVE PERCENT: 79 % (ref 43–80)
PDW BLD-RTO: 15.5 FL (ref 11.5–15)
PHOSPHORUS: 3.1 MG/DL (ref 2.5–4.5)
PLATELET # BLD: 540 E9/L (ref 130–450)
PMV BLD AUTO: 10 FL (ref 7–12)
POTASSIUM SERPL-SCNC: 4.5 MMOL/L (ref 3.5–5)
RBC # BLD: 4.14 E12/L (ref 3.8–5.8)
SODIUM BLD-SCNC: 137 MMOL/L (ref 132–146)
WBC # BLD: 19.4 E9/L (ref 4.5–11.5)

## 2022-06-15 PROCEDURE — 2580000003 HC RX 258: Performed by: STUDENT IN AN ORGANIZED HEALTH CARE EDUCATION/TRAINING PROGRAM

## 2022-06-15 PROCEDURE — 1200000000 HC SEMI PRIVATE

## 2022-06-15 PROCEDURE — 6360000002 HC RX W HCPCS: Performed by: STUDENT IN AN ORGANIZED HEALTH CARE EDUCATION/TRAINING PROGRAM

## 2022-06-15 PROCEDURE — 84100 ASSAY OF PHOSPHORUS: CPT

## 2022-06-15 PROCEDURE — 82962 GLUCOSE BLOOD TEST: CPT

## 2022-06-15 PROCEDURE — 36415 COLL VENOUS BLD VENIPUNCTURE: CPT

## 2022-06-15 PROCEDURE — 2700000000 HC OXYGEN THERAPY PER DAY

## 2022-06-15 PROCEDURE — 6360000002 HC RX W HCPCS: Performed by: SURGERY

## 2022-06-15 PROCEDURE — 83735 ASSAY OF MAGNESIUM: CPT

## 2022-06-15 PROCEDURE — 80048 BASIC METABOLIC PNL TOTAL CA: CPT

## 2022-06-15 PROCEDURE — C9113 INJ PANTOPRAZOLE SODIUM, VIA: HCPCS | Performed by: STUDENT IN AN ORGANIZED HEALTH CARE EDUCATION/TRAINING PROGRAM

## 2022-06-15 PROCEDURE — 6370000000 HC RX 637 (ALT 250 FOR IP): Performed by: STUDENT IN AN ORGANIZED HEALTH CARE EDUCATION/TRAINING PROGRAM

## 2022-06-15 PROCEDURE — 97161 PT EVAL LOW COMPLEX 20 MIN: CPT

## 2022-06-15 PROCEDURE — A4216 STERILE WATER/SALINE, 10 ML: HCPCS | Performed by: STUDENT IN AN ORGANIZED HEALTH CARE EDUCATION/TRAINING PROGRAM

## 2022-06-15 PROCEDURE — 85025 COMPLETE CBC W/AUTO DIFF WBC: CPT

## 2022-06-15 PROCEDURE — 97165 OT EVAL LOW COMPLEX 30 MIN: CPT

## 2022-06-15 PROCEDURE — 2580000003 HC RX 258: Performed by: SPECIALIST

## 2022-06-15 PROCEDURE — 6360000002 HC RX W HCPCS: Performed by: SPECIALIST

## 2022-06-15 RX ORDER — INSULIN LISPRO 100 [IU]/ML
0-6 INJECTION, SOLUTION INTRAVENOUS; SUBCUTANEOUS
Status: DISCONTINUED | OUTPATIENT
Start: 2022-06-15 | End: 2022-06-18

## 2022-06-15 RX ORDER — DEXTROSE, SODIUM CHLORIDE, SODIUM LACTATE, POTASSIUM CHLORIDE, AND CALCIUM CHLORIDE 5; .6; .31; .03; .02 G/100ML; G/100ML; G/100ML; G/100ML; G/100ML
INJECTION, SOLUTION INTRAVENOUS CONTINUOUS
Status: DISCONTINUED | OUTPATIENT
Start: 2022-06-15 | End: 2022-06-19

## 2022-06-15 RX ADMIN — SODIUM CHLORIDE, SODIUM LACTATE, POTASSIUM CHLORIDE, CALCIUM CHLORIDE AND DEXTROSE MONOHYDRATE: 5; 600; 310; 30; 20 INJECTION, SOLUTION INTRAVENOUS at 19:25

## 2022-06-15 RX ADMIN — SODIUM CHLORIDE, PRESERVATIVE FREE 40 MG: 5 INJECTION INTRAVENOUS at 18:29

## 2022-06-15 RX ADMIN — ENOXAPARIN SODIUM 30 MG: 100 INJECTION SUBCUTANEOUS at 09:04

## 2022-06-15 RX ADMIN — HYDROMORPHONE HYDROCHLORIDE 0.5 MG: 1 INJECTION, SOLUTION INTRAMUSCULAR; INTRAVENOUS; SUBCUTANEOUS at 02:26

## 2022-06-15 RX ADMIN — HYDROMORPHONE HYDROCHLORIDE 0.5 MG: 1 INJECTION, SOLUTION INTRAMUSCULAR; INTRAVENOUS; SUBCUTANEOUS at 12:40

## 2022-06-15 RX ADMIN — ACETAMINOPHEN 1000 MG: 500 TABLET ORAL at 23:42

## 2022-06-15 RX ADMIN — METHOCARBAMOL 500 MG: 100 INJECTION, SOLUTION INTRAMUSCULAR; INTRAVENOUS at 18:29

## 2022-06-15 RX ADMIN — SODIUM CHLORIDE, SODIUM LACTATE, POTASSIUM CHLORIDE, CALCIUM CHLORIDE AND DEXTROSE MONOHYDRATE: 5; 600; 310; 30; 20 INJECTION, SOLUTION INTRAVENOUS at 09:17

## 2022-06-15 RX ADMIN — HYDROMORPHONE HYDROCHLORIDE 0.5 MG: 1 INJECTION, SOLUTION INTRAMUSCULAR; INTRAVENOUS; SUBCUTANEOUS at 21:55

## 2022-06-15 RX ADMIN — MEROPENEM 1000 MG: 1 INJECTION, POWDER, FOR SOLUTION INTRAVENOUS at 03:27

## 2022-06-15 RX ADMIN — HYDROMORPHONE HYDROCHLORIDE 0.5 MG: 1 INJECTION, SOLUTION INTRAMUSCULAR; INTRAVENOUS; SUBCUTANEOUS at 09:04

## 2022-06-15 RX ADMIN — ACETAMINOPHEN 1000 MG: 500 TABLET ORAL at 09:04

## 2022-06-15 RX ADMIN — SODIUM CHLORIDE, POTASSIUM CHLORIDE, SODIUM LACTATE AND CALCIUM CHLORIDE: 600; 310; 30; 20 INJECTION, SOLUTION INTRAVENOUS at 02:38

## 2022-06-15 RX ADMIN — METHOCARBAMOL 500 MG: 100 INJECTION, SOLUTION INTRAMUSCULAR; INTRAVENOUS at 10:33

## 2022-06-15 RX ADMIN — ACETAMINOPHEN 1000 MG: 500 TABLET ORAL at 16:16

## 2022-06-15 RX ADMIN — HYDROMORPHONE HYDROCHLORIDE 0.5 MG: 1 INJECTION, SOLUTION INTRAMUSCULAR; INTRAVENOUS; SUBCUTANEOUS at 16:17

## 2022-06-15 RX ADMIN — FLUCONAZOLE 400 MG: 2 INJECTION, SOLUTION INTRAVENOUS at 16:21

## 2022-06-15 RX ADMIN — METHOCARBAMOL 500 MG: 100 INJECTION, SOLUTION INTRAMUSCULAR; INTRAVENOUS at 02:35

## 2022-06-15 RX ADMIN — SODIUM CHLORIDE, PRESERVATIVE FREE 40 MG: 5 INJECTION INTRAVENOUS at 09:05

## 2022-06-15 RX ADMIN — MEROPENEM 1000 MG: 1 INJECTION, POWDER, FOR SOLUTION INTRAVENOUS at 19:34

## 2022-06-15 RX ADMIN — MEROPENEM 1000 MG: 1 INJECTION, POWDER, FOR SOLUTION INTRAVENOUS at 11:25

## 2022-06-15 RX ADMIN — ENOXAPARIN SODIUM 30 MG: 100 INJECTION SUBCUTANEOUS at 21:00

## 2022-06-15 ASSESSMENT — PAIN DESCRIPTION - ORIENTATION
ORIENTATION: MID
ORIENTATION: MID
ORIENTATION: LEFT;RIGHT
ORIENTATION: MID

## 2022-06-15 ASSESSMENT — PAIN - FUNCTIONAL ASSESSMENT
PAIN_FUNCTIONAL_ASSESSMENT: ACTIVITIES ARE NOT PREVENTED
PAIN_FUNCTIONAL_ASSESSMENT: PREVENTS OR INTERFERES WITH MANY ACTIVE NOT PASSIVE ACTIVITIES
PAIN_FUNCTIONAL_ASSESSMENT: ACTIVITIES ARE NOT PREVENTED
PAIN_FUNCTIONAL_ASSESSMENT: PREVENTS OR INTERFERES SOME ACTIVE ACTIVITIES AND ADLS
PAIN_FUNCTIONAL_ASSESSMENT: PREVENTS OR INTERFERES SOME ACTIVE ACTIVITIES AND ADLS
PAIN_FUNCTIONAL_ASSESSMENT: ACTIVITIES ARE NOT PREVENTED

## 2022-06-15 ASSESSMENT — PAIN SCALES - GENERAL
PAINLEVEL_OUTOF10: 3
PAINLEVEL_OUTOF10: 8
PAINLEVEL_OUTOF10: 5
PAINLEVEL_OUTOF10: 2
PAINLEVEL_OUTOF10: 5
PAINLEVEL_OUTOF10: 6
PAINLEVEL_OUTOF10: 6
PAINLEVEL_OUTOF10: 2

## 2022-06-15 ASSESSMENT — PAIN DESCRIPTION - FREQUENCY
FREQUENCY: INTERMITTENT
FREQUENCY: INTERMITTENT

## 2022-06-15 ASSESSMENT — PAIN DESCRIPTION - LOCATION
LOCATION: ABDOMEN
LOCATION: ABDOMEN;INCISION
LOCATION: ABDOMEN

## 2022-06-15 ASSESSMENT — PAIN DESCRIPTION - DESCRIPTORS
DESCRIPTORS: DISCOMFORT
DESCRIPTORS: SORE
DESCRIPTORS: ACHING;DISCOMFORT
DESCRIPTORS: ACHING;CRAMPING

## 2022-06-15 ASSESSMENT — PAIN DESCRIPTION - PAIN TYPE
TYPE: SURGICAL PAIN;ACUTE PAIN
TYPE: SURGICAL PAIN;ACUTE PAIN

## 2022-06-15 ASSESSMENT — PAIN DESCRIPTION - ONSET
ONSET: ON-GOING
ONSET: ON-GOING

## 2022-06-15 NOTE — PROGRESS NOTES
[address] [place] [address] [facility]  Progress Note    SUBJECTIVE:  Chief Complaint   Patient presents with    Abdominal Pain     constipation x 1 week, bloating. Patient is tolerating medications. No reported adverse drug reactions. No nausea, vomiting, diarrhea. Laying in bed, feels better, family member present  LUC drain was removed this AM  About to eat lunch, on clears  No fevers    Review of systems:  As stated above in the chief complaint, otherwise negative. Medications:  Scheduled Meds:   meropenem  1,000 mg IntraVENous Q8H    pantoprazole (PROTONIX) 40 mg injection  40 mg IntraVENous Q12H    insulin lispro  0-6 Units SubCUTAneous Q4H    fluconazole  400 mg IntraVENous Q24H    acetaminophen  1,000 mg Oral q8h    methocarbamol IVPB  500 mg IntraVENous Q8H    enoxaparin  30 mg SubCUTAneous BID     Continuous Infusions:   dextrose 5% in lactated ringers 100 mL/hr at 06/15/22 0917    dextrose       PRN Meds:glucose, dextrose bolus **OR** dextrose bolus, glucagon (rDNA), dextrose, HYDROmorphone **OR** [DISCONTINUED] HYDROmorphone, ondansetron    OBJECTIVE:  BP (!) 145/84   Pulse 63   Temp 98 °F (36.7 °C) (Oral)   Resp 16   Ht 5' 7\" (1.702 m)   Wt 245 lb (111.1 kg)   SpO2 97%   BMI 38.37 kg/m²   Temp  Av.1 °F (36.7 °C)  Min: 97.8 °F (36.6 °C)  Max: 98.5 °F (36.9 °C)  Constitutional: The patient is awake, alert, and oriented. Laying in bed, feels better  Skin: Warm and dry. No rashes were noted. HEENT: Round and reactive pupils. Moist mucous membranes. No ulcerations or thrush. NG tube  Neck: Supple to movements. Chest: No use of accessory muscles to breathe. Symmetrical expansion. No wheezing, crackles or rhonchi. Cardiovascular: S1 and S2 are rhythmic and regular. No murmurs appreciated. Abdomen: Hypoactive bowel sounds to auscultation. Benign to palpation. No masses felt. No hepatosplenomegaly.   ABD on the incision line, colostomy with bowel sweat  Extremities: No cyanosis or clubbing 2+ edema  Lines: peripheral    Laboratory and Tests Review:  Lab Results   Component Value Date    WBC 19.4 (H) 06/15/2022    WBC 27.7 (H) 06/14/2022    WBC 14.2 (H) 06/13/2022    HGB 11.5 (L) 06/15/2022    HCT 36.4 (L) 06/15/2022    MCV 87.9 06/15/2022     (H) 06/15/2022     Lab Results   Component Value Date    NEUTROABS 15.27 (H) 06/15/2022    NEUTROABS 26.32 (H) 06/14/2022    NEUTROABS 11.93 (H) 06/13/2022     No results found for: CRPHS  Lab Results   Component Value Date    ALT 49 (H) 06/05/2022    AST 35 06/05/2022    ALKPHOS 125 06/05/2022    BILITOT 0.7 06/05/2022     Lab Results   Component Value Date     06/15/2022    K 4.5 06/15/2022    K 4.8 06/14/2022     06/15/2022    CO2 27 06/15/2022    BUN 14 06/15/2022    CREATININE 1.0 06/15/2022    CREATININE 1.1 06/14/2022    CREATININE 1.0 06/13/2022    GFRAA >60 06/15/2022    LABGLOM >60 06/15/2022    GLUCOSE 98 06/15/2022    PROT 7.2 06/05/2022    LABALBU 3.7 06/05/2022    CALCIUM 8.4 06/15/2022    BILITOT 0.7 06/05/2022    ALKPHOS 125 06/05/2022    AST 35 06/05/2022    ALT 49 06/05/2022     Lab Results   Component Value Date    CRP 29.6 (H) 06/08/2022     Lab Results   Component Value Date    SEDRATE 62 (H) 06/08/2022     Radiology:  Reviewed    Microbiology:   Blood Cultures 6/12/22: negative so far  MRSA nares: negative  Body Fluid Cultures 6/13/22: pending  Gram Stain: no organisms seen    ASSESSMENT:  Diverticulitis with pelvic abscess  Leukocytosis related to undrained abscess  That is post exploratory lap; transverse and colostomy and appendectomy with hernia repair  Cytosis    PLAN:  Continue Merrem and Diflucan pending cultures  Check final cultures  Monitor labs    MARBIN Bah - CNP  11:19 AM  6/15/2022   Pt seen and examined. Above discussed agree with advanced practice nurse. Labs, cultures, and radiographs reviewed. Face to Face encounter occurred. Changes made as necessary.      Rodrigo Giana Amaro MD

## 2022-06-15 NOTE — PATIENT CARE CONFERENCE
[facility] Quality Flow/Interdisciplinary Rounds Progress Note        Quality Flow Rounds held on Estefania 15, 2022    Disciplines Attending:  Bedside Nurse, ,  and Nursing Unit Leadership    Myriam Joaquin was admitted on 6/5/2022 11:55 AM    Anticipated Discharge Date:  Expected Discharge Date: 06/16/22    Disposition:    Boubacar Score:  Boubacar Scale Score: 18    Readmission Risk              Risk of Unplanned Readmission:  14           Discussed patient goal for the day, patient clinical progression, and barriers to discharge.   The following Goal(s) of the Day/Commitment(s) have been identified:  Diagnostics - Report Results      Alma Whitfield RN  Estefania 15, 2022

## 2022-06-15 NOTE — PROGRESS NOTES
Physical Therapy  Facility/Department: [facility] SURG  Physical Therapy Initial Assessment    Name: Elsa Sierra  : 1975  MRN: [id number]  Date of Service: 6/15/2022      Patient Diagnosis(es): The primary encounter diagnosis was Diverticulitis of colon. Diagnoses of Fatty liver, Kidney cysts, Umbilical hernia without obstruction and without gangrene, and Diverticulitis were also pertinent to this visit. Past Medical History:  has a past medical history of Depression and Hypertension. Past Surgical History:  has a past surgical history that includes Tonsillectomy; Colonoscopy (N/A, 2022); and laparotomy (N/A, 2022). Evaluating Therapist: Britton Bauer PT    Room #:  1481/1201-I  Diagnosis:  Diverticulitis [K57.92]  Procedure/Surgery:   . EXPLORATORY LAPAROTOMY  2. MOBILIZATION OF SPLENIC FLEXURE  3. OPEN LEFT HEMICOLECTOMY  4. TRANSVERSE END COLOSTOMY  5. APPENDECTOMY  6. PARTIAL OMENTECTOMY  7. PRIMARY REPAIR OF UMBILICAL HERNIA  Precautions:  Falls, colostomy      Social:  Pt lives alone but could go to his parents home at discharge. Both homes are 1 floor. Pt independent all areas prior to admit. Initial Evaluation  Date: 6/15/22 Treatment      Short Term/ Long Term   Goals   Was pt agreeable to Eval/treatment? yes     Does pt have pain? 4/10 abdominal pain     Bed Mobility  Rolling: NT  Supine to sit: NT  Sit to supine: SBA  Scooting: SBA  independent   Transfers Sit to stand: CGA  Stand to sit: CGA  Stand pivot: CGA  independent   Ambulation    40 feet with ww with CGA  200 feet with device as needed independent   Stair Negotiation  Ascended and descended  NT   1 steps with 1 rail independnet   LE strength     4/5    4+/5   balance      Fair+     AM-PAC Raw score               17/24         Pt is alert and Oriented   LE ROM: WFL  Sensation: intact  Edema: none  Endurance: fair       ASSESSMENT:    Pt displays functional ability as noted in the objective portion of this evaluation. Patient education  Pt educated on PT objectives    Patient response to education:   Pt verbalized understanding Pt demonstrated skill Pt requires further education in this area   yes           Comments:  Pt fatigued with ambulation. Relies heavily on UE support on walker    Pt's/ family goals   1. To return home    Conditions Requiring Skilled Therapeutic Intervention:    [x]Decreased strength     []Decreased ROM  [x]Decreased functional mobility  [x]Decreased balance   [x]Decreased endurance   []Decreased posture  []Decreased sensation  []Decreased coordination   []Decreased vision  []Decreased safety awareness   [x]Increased pain       Patient and or family understand(s) diagnosis, prognosis, and plan of care. Prognosis is good for reaching above PT goals    PHYSICAL THERAPY PLAN OF CARE:    PT POC is established based on physician order and patient diagnosis     Referring provider/PT Order: Geo Blair DO/ PT eval and treat      Current Treatment Recommendations:     [x] Strengthening to improve independence with functional mobility   [] ROM to improve independence with functional mobility   [x] Balance Training to improve static/dynamic balance and to reduce fall risk  [x] Endurance Training to improve activity tolerance during functional mobility   [x] Transfer Training to improve safety and independence with all functional transfers   [x] Gait Training to improve gait mechanics, endurance and assess need for appropriate assistive device  [x] Stair Training in preparation for safe discharge home and/or into the community   [] Positioning to prevent skin breakdown and contractures  [x] Safety and Education Training   [x] Patient/Caregiver Education   [] HEP  [] Other     PT long term treatment goals are located in above grid    Frequency of treatments: 2-5x/week x 5-7 days.     Time in  130  Time out  145        Evaluation Time includes thorough review of current medical information, gathering information on past medical history/social history and prior level of function, completion of standardized testing/informal observation of tasks, assessment of data and education on plan of care and goals.       CPT codes:  [x] Low Complexity PT evaluation 10053  [] Moderate Complexity PT evaluation 81634  [] High Complexity PT evaluation 84304  [] PT Re-evaluation 43550  [] Gait training 38463 minutes  [] Manual therapy 97429 minutes  [] Therapeutic activities 44232 minutes  [] Therapeutic exercises 79353 minutes  [] Neuromuscular reeducation 59630 minutes     [facility] PSYCHIATRY PT [id number]

## 2022-06-15 NOTE — PROGRESS NOTES
Department of General Surgery - Adult  Surgical Service   Attending Progress Note      SUBJECTIVE: No overnight events afebrile vital signs stable at this time. NG LUC and Desai catheter removed today. No ostomy function at this time. Pain reasonably controlled    OBJECTIVE      Physical    VITALS:  BP (!) 145/84   Pulse 63   Temp 98 °F (36.7 °C) (Oral)   Resp 16   Ht 5' 7\" (1.702 m)   Wt 245 lb (111.1 kg)   SpO2 97%   BMI 38.37 kg/m²     General Appearance:  awake, alert, oriented, in no acute distress  Skin:  Skin color, texture, turgor normal  Head/face:  NCAT  Eyes:  No gross abnormalities. Sclera nonicteric  Lungs/Chest:  Normal expansion. No respiratory distress. Heart: Warm throughout. Regular rate   Abdomen:  Soft, appropriately tender, midline wound clean, ostomy pink with some blood in ostomy bag. Extremities: Extremities warm to touch, pink, with no edema.     Data  CBC with Differential:    Lab Results   Component Value Date    WBC 19.4 06/15/2022    RBC 4.14 06/15/2022    HGB 11.5 06/15/2022    HCT 36.4 06/15/2022     06/15/2022    MCV 87.9 06/15/2022    MCH 27.8 06/15/2022    MCHC 31.6 06/15/2022    RDW 15.5 06/15/2022    NRBC 0.0 06/13/2022    METASPCT 1.0 06/14/2022    LYMPHOPCT 11.8 06/15/2022    MONOPCT 5.2 06/15/2022    MYELOPCT 1.0 06/14/2022    BASOPCT 0.2 06/15/2022    MONOSABS 1.01 06/15/2022    LYMPHSABS 2.29 06/15/2022    EOSABS 0.26 06/15/2022    BASOSABS 0.04 06/15/2022     BMP:    Lab Results   Component Value Date     06/15/2022    K 4.5 06/15/2022    K 4.8 06/14/2022     06/15/2022    CO2 27 06/15/2022    BUN 14 06/15/2022    LABALBU 3.7 06/05/2022    CREATININE 1.0 06/15/2022    CALCIUM 8.4 06/15/2022    GFRAA >60 06/15/2022    LABGLOM >60 06/15/2022    GLUCOSE 98 06/15/2022       ASSESSMENT AND PLAN    50 y.o. male with complicated diverticulitis s/p exploratory laparotomy with left hemicolectomy, appendectomy with end colostomy 6/13     - antibiotics per ID  - continue CLD Await ROBF  - IVF  - pain/nausea control prn  - ambulate  - local wound care to midline - will plan on delayed primary closure soon       Laurie Painter MD

## 2022-06-15 NOTE — PROGRESS NOTES
LUC drain removed at bedside. The patient tolerated this without any issue. Minimal serous drainage from drain site. A dressing of clean gauze was applied.     Electronically signed by Sachi Cooper DO on 6/15/2022 at 10:14 AM

## 2022-06-15 NOTE — PLAN OF CARE
Problem: Pain  Goal: Verbalizes/displays adequate comfort level or baseline comfort level  Outcome: Progressing     Problem: Safety - Adult  Goal: Free from fall injury  Outcome: Progressing     Problem: Anxiety  Goal: Will report anxiety at manageable levels  Description: INTERVENTIONS:  1. Administer medication as ordered  2. Teach and rehearse alternative coping skills  3.  Provide emotional support with 1:1 interaction with staff  Outcome: Progressing     Problem: ABCDS Injury Assessment  Goal: Absence of physical injury  Outcome: Progressing     Problem: Discharge Planning  Goal: Discharge to home or other facility with appropriate resources  Outcome: Progressing     Problem: Nutrition Deficit:  Goal: Optimize nutritional status  Outcome: Progressing

## 2022-06-15 NOTE — PROGRESS NOTES
Occupational Therapy  OCCUPATIONAL THERAPY INITIAL EVALUATION     [facility] [place], [address]                                                  Patient Name: Elizabeth Gentile    MRN: [id number]    : 1975    Room: [address] [place] [address]      Evaluating OT: Reilly Mail, OTR/L [id number]      Referring Provider: Keri Valente DO    Specific Provider Orders/Date: OT eval and treat 6/15/22      Diagnosis:  Diverticulitis [K57.92]    Surgery: 22: EXPLORATORY LAPAROTOMY  2. MOBILIZATION OF SPLENIC FLEXURE  3. OPEN LEFT HEMICOLECTOMY  4. TRANSVERSE END COLOSTOMY  5. APPENDECTOMY  6. PARTIAL OMENTECTOMY  7.  PRIMARY REPAIR OF UMBILICAL HERNIA     Pertinent Medical History: HTN       Precautions:  Fall Risk, colostomy     Assessment of current deficits    [x] Functional mobility  [x]ADLs  [x] Strength               []Cognition    [x] Functional transfers   [x] IADLs         [x] Safety Awareness   [x]Endurance    [] Fine Coordination              [x] Balance      [] Vision/perception   []Sensation     []Gross Motor Coordination  [] ROM  [] Delirium                   [] Motor Control     OT PLAN OF CARE   OT POC based on physician orders, patient diagnosis and results of clinical assessment    Frequency/Duration 2-5 days/wk for 2 weeks PRN   Specific OT Treatment Interventions to include:   * Instruction/training on adapted ADL techniques and AE recommendations to increase functional independence within precautions       * Training on energy conservation strategies, correct breathing pattern and techniques to improve independence/tolerance for self-care routine  * Functional transfer/mobility training/DME recommendations for increased independence, safety, and fall prevention  * Patient/Family education to increase follow through with safety techniques and functional independence  * Recommendation of environmental modifications for increased safety with functional transfers/mobility and ADLs  * Therapeutic exercise to improve motor endurance, ROM, and functional strength for ADLs/functional transfers  * Therapeutic activities to facilitate/challenge dynamic balance, stand tolerance for increased safety and independence with ADLs      Recommended Adaptive Equipment: TBD     Home Living: Pt lives alone in 1 story house. Pt has walk in shower with shower seat and hand held shower head. Pt reports he may d/c to parent's house, which is 1 story and has a walk in shower with shower chair and grab bars. Pt does not use a device for functional mobility. Prior Level of Function: independent with ADLs , independent with IADLs;  Driving: yes  Occupation: assistant law director     Pain Level: pt reported 4-5/10 abdominal pain and stated that he was recently given a pain pill  Cognition: A&O: 4/4; WFL command follow demonstrated. Pt pleasant during session.    Memory:  Good    Sequencing:  Good    Problem solving:  Good    Judgement/safety:  Good      Functional Assessment:  AM-PAC Daily Activity Raw Score: 17/24   Initial Eval Status  Date: 6/15/22 Treatment Status  Date: STGs = LTGs  Time frame: 10-14 days   Feeding Independent      Grooming CGA  Mod I/I   UB Dressing Min A  Mod I/I   LB Dressing Mod A   Mod I/I-with use of AD as appropriate/needed   Bathing Mod A  Mod I/I -with use of AD as appropriate/needed   Toileting Min A  Mod I/I    Bed Mobility   Sit to supine: SBA   Independent    Functional Transfers CGA with wheeled walker  Sit to stand from chair  Stand to sit to EOB  Cues for hand placement and technique   Independent     Functional Mobility CGA with wheeled walker  Short distance in room  Cues for safe wheeled walker management  Assist to manage lines  Independent  -with device as needed to maximize independence with ADLs and functional task completion   Balance Sitting:     Static: independent     Dynamic: SBA  Standing: CGA with wheeled walker I for dynamic sitting balance to maximize independence with ADLs and functional task completion    I for standing balance to maximize independence with ADLs and functional task completion   Activity Tolerance Fair with light activity. Pt limited by pain. Good with ADL activity. Pt will demonstrate good understanding of education provided on EC/WS techniques    Visual/  Perceptual Glasses: none at bedside                Additional long-term goal: Pt will increase functional independence to PLOF to allow pt to live in least restrictive environment. Hand Dominance R   AROM (PROM) Strength Additional Info:    RUE  WFL grossly WFL grossly good  and wfl FMC/dexterity noted during functional bed mobility and transfers     LUE WFL grossly WFL grossly good  and wfl FMC/dexterity noted during functional bed mobility and transfers     Hearing: [facility]   Sensation:  No c/o numbness or tingling   Tone: WFL   Edema: none noted    Comments: Upon arrival patient sitting in chair. At end of session, patient lying in bed with call light and phone within reach, all lines and tubes intact. Overall patient demonstrated decreased independence and safety during completion of ADL/functional transfer/mobility tasks. Pt would benefit from continued skilled OT to increase safety and independence with completion of ADL/IADL tasks for functional independence and quality of life. Rehab Potential: Good for established goals     Patient / Family Goal: none stated    Patient and/or family were instructed on functional diagnosis, prognosis/goals and OT plan of care. Demonstrated good understanding.      Eval Complexity: Low    Time In: 1330  Time Out: 1343    Min Units   OT Eval Low 97165  x  1   OT Eval Medium 52319      OT Eval High 15259      OT Re-Eval [id number]       Therapeutic Ex 37687       Therapeutic Activities 49234       ADL/Self Care 93017       Orthotic Management 83126       Manual [id number]     Neuro Re-Ed 43958 Non-Billable Time          Evaluation Time additionally includes thorough review of current medical information, gathering information on past medical history/social history and prior level of function, interpretation of standardized testing/informal observation of tasks, assessment of data and development of plan of care and goals.             Eun Harrington, OTR/L, [id number]

## 2022-06-16 LAB
ANION GAP SERPL CALCULATED.3IONS-SCNC: 10 MMOL/L (ref 7–16)
BASOPHILS ABSOLUTE: 0.05 E9/L (ref 0–0.2)
BASOPHILS RELATIVE PERCENT: 0.3 % (ref 0–2)
BUN BLDV-MCNC: 12 MG/DL (ref 6–20)
CALCIUM SERPL-MCNC: 8.4 MG/DL (ref 8.6–10.2)
CHLORIDE BLD-SCNC: 100 MMOL/L (ref 98–107)
CO2: 27 MMOL/L (ref 22–29)
CREAT SERPL-MCNC: 0.9 MG/DL (ref 0.7–1.2)
EOSINOPHILS ABSOLUTE: 0.39 E9/L (ref 0.05–0.5)
EOSINOPHILS RELATIVE PERCENT: 2.6 % (ref 0–6)
GFR AFRICAN AMERICAN: >60
GFR NON-AFRICAN AMERICAN: >60 ML/MIN/1.73
GLUCOSE BLD-MCNC: 137 MG/DL (ref 74–99)
HCT VFR BLD CALC: 38 % (ref 37–54)
HEMOGLOBIN: 12.1 G/DL (ref 12.5–16.5)
IMMATURE GRANULOCYTES #: 0.34 E9/L
IMMATURE GRANULOCYTES %: 2.3 % (ref 0–5)
LYMPHOCYTES ABSOLUTE: 2.76 E9/L (ref 1.5–4)
LYMPHOCYTES RELATIVE PERCENT: 18.4 % (ref 20–42)
MAGNESIUM: 2 MG/DL (ref 1.6–2.6)
MCH RBC QN AUTO: 27.6 PG (ref 26–35)
MCHC RBC AUTO-ENTMCNC: 31.8 % (ref 32–34.5)
MCV RBC AUTO: 86.6 FL (ref 80–99.9)
METER GLUCOSE: 110 MG/DL (ref 74–99)
METER GLUCOSE: 113 MG/DL (ref 74–99)
METER GLUCOSE: 125 MG/DL (ref 74–99)
METER GLUCOSE: 136 MG/DL (ref 74–99)
MONOCYTES ABSOLUTE: 0.86 E9/L (ref 0.1–0.95)
MONOCYTES RELATIVE PERCENT: 5.7 % (ref 2–12)
NEUTROPHILS ABSOLUTE: 10.61 E9/L (ref 1.8–7.3)
NEUTROPHILS RELATIVE PERCENT: 70.7 % (ref 43–80)
PDW BLD-RTO: 15.1 FL (ref 11.5–15)
PHOSPHORUS: 3.2 MG/DL (ref 2.5–4.5)
PLATELET # BLD: 589 E9/L (ref 130–450)
PMV BLD AUTO: 10.1 FL (ref 7–12)
POTASSIUM SERPL-SCNC: 3.8 MMOL/L (ref 3.5–5)
RBC # BLD: 4.39 E12/L (ref 3.8–5.8)
SODIUM BLD-SCNC: 137 MMOL/L (ref 132–146)
WBC # BLD: 15 E9/L (ref 4.5–11.5)

## 2022-06-16 PROCEDURE — 6370000000 HC RX 637 (ALT 250 FOR IP)

## 2022-06-16 PROCEDURE — 36415 COLL VENOUS BLD VENIPUNCTURE: CPT

## 2022-06-16 PROCEDURE — 6370000000 HC RX 637 (ALT 250 FOR IP): Performed by: STUDENT IN AN ORGANIZED HEALTH CARE EDUCATION/TRAINING PROGRAM

## 2022-06-16 PROCEDURE — 85025 COMPLETE CBC W/AUTO DIFF WBC: CPT

## 2022-06-16 PROCEDURE — 84100 ASSAY OF PHOSPHORUS: CPT

## 2022-06-16 PROCEDURE — A4216 STERILE WATER/SALINE, 10 ML: HCPCS | Performed by: STUDENT IN AN ORGANIZED HEALTH CARE EDUCATION/TRAINING PROGRAM

## 2022-06-16 PROCEDURE — 6360000002 HC RX W HCPCS: Performed by: SPECIALIST

## 2022-06-16 PROCEDURE — 2580000003 HC RX 258: Performed by: STUDENT IN AN ORGANIZED HEALTH CARE EDUCATION/TRAINING PROGRAM

## 2022-06-16 PROCEDURE — C9113 INJ PANTOPRAZOLE SODIUM, VIA: HCPCS | Performed by: STUDENT IN AN ORGANIZED HEALTH CARE EDUCATION/TRAINING PROGRAM

## 2022-06-16 PROCEDURE — 6360000002 HC RX W HCPCS: Performed by: SURGERY

## 2022-06-16 PROCEDURE — 80048 BASIC METABOLIC PNL TOTAL CA: CPT

## 2022-06-16 PROCEDURE — 1200000000 HC SEMI PRIVATE

## 2022-06-16 PROCEDURE — 6360000002 HC RX W HCPCS: Performed by: STUDENT IN AN ORGANIZED HEALTH CARE EDUCATION/TRAINING PROGRAM

## 2022-06-16 PROCEDURE — 83735 ASSAY OF MAGNESIUM: CPT

## 2022-06-16 PROCEDURE — 2580000003 HC RX 258: Performed by: SPECIALIST

## 2022-06-16 PROCEDURE — 82962 GLUCOSE BLOOD TEST: CPT

## 2022-06-16 RX ORDER — LIDOCAINE HYDROCHLORIDE AND EPINEPHRINE 10; 10 MG/ML; UG/ML
20 INJECTION, SOLUTION INFILTRATION; PERINEURAL ONCE
Status: COMPLETED | OUTPATIENT
Start: 2022-06-16 | End: 2022-06-17

## 2022-06-16 RX ADMIN — MEROPENEM 1000 MG: 1 INJECTION, POWDER, FOR SOLUTION INTRAVENOUS at 03:21

## 2022-06-16 RX ADMIN — METHOCARBAMOL 500 MG: 100 INJECTION, SOLUTION INTRAMUSCULAR; INTRAVENOUS at 02:16

## 2022-06-16 RX ADMIN — MEROPENEM 1000 MG: 1 INJECTION, POWDER, FOR SOLUTION INTRAVENOUS at 18:54

## 2022-06-16 RX ADMIN — FLUCONAZOLE 400 MG: 150 TABLET ORAL at 13:43

## 2022-06-16 RX ADMIN — ACETAMINOPHEN 1000 MG: 500 TABLET ORAL at 16:11

## 2022-06-16 RX ADMIN — SODIUM CHLORIDE, SODIUM LACTATE, POTASSIUM CHLORIDE, CALCIUM CHLORIDE AND DEXTROSE MONOHYDRATE: 5; 600; 310; 30; 20 INJECTION, SOLUTION INTRAVENOUS at 11:14

## 2022-06-16 RX ADMIN — SODIUM CHLORIDE, PRESERVATIVE FREE 40 MG: 5 INJECTION INTRAVENOUS at 06:04

## 2022-06-16 RX ADMIN — SODIUM CHLORIDE, SODIUM LACTATE, POTASSIUM CHLORIDE, CALCIUM CHLORIDE AND DEXTROSE MONOHYDRATE: 5; 600; 310; 30; 20 INJECTION, SOLUTION INTRAVENOUS at 03:25

## 2022-06-16 RX ADMIN — HYDROMORPHONE HYDROCHLORIDE 0.5 MG: 1 INJECTION, SOLUTION INTRAMUSCULAR; INTRAVENOUS; SUBCUTANEOUS at 02:11

## 2022-06-16 RX ADMIN — ENOXAPARIN SODIUM 30 MG: 100 INJECTION SUBCUTANEOUS at 08:38

## 2022-06-16 RX ADMIN — METHOCARBAMOL 500 MG: 100 INJECTION, SOLUTION INTRAMUSCULAR; INTRAVENOUS at 11:15

## 2022-06-16 RX ADMIN — HYDROMORPHONE HYDROCHLORIDE 0.5 MG: 1 INJECTION, SOLUTION INTRAMUSCULAR; INTRAVENOUS; SUBCUTANEOUS at 16:11

## 2022-06-16 RX ADMIN — METHOCARBAMOL 500 MG: 100 INJECTION, SOLUTION INTRAMUSCULAR; INTRAVENOUS at 18:55

## 2022-06-16 RX ADMIN — MEROPENEM 1000 MG: 1 INJECTION, POWDER, FOR SOLUTION INTRAVENOUS at 11:18

## 2022-06-16 RX ADMIN — SODIUM CHLORIDE, PRESERVATIVE FREE 40 MG: 5 INJECTION INTRAVENOUS at 18:54

## 2022-06-16 RX ADMIN — ACETAMINOPHEN 1000 MG: 500 TABLET ORAL at 08:36

## 2022-06-16 ASSESSMENT — PAIN DESCRIPTION - ORIENTATION
ORIENTATION: MID
ORIENTATION: RIGHT;LEFT
ORIENTATION: MID
ORIENTATION: MID

## 2022-06-16 ASSESSMENT — PAIN SCALES - GENERAL
PAINLEVEL_OUTOF10: 2
PAINLEVEL_OUTOF10: 4
PAINLEVEL_OUTOF10: 3
PAINLEVEL_OUTOF10: 4
PAINLEVEL_OUTOF10: 0
PAINLEVEL_OUTOF10: 3

## 2022-06-16 ASSESSMENT — PAIN DESCRIPTION - LOCATION
LOCATION: ABDOMEN
LOCATION: INCISION;ABDOMEN

## 2022-06-16 ASSESSMENT — PAIN DESCRIPTION - DESCRIPTORS
DESCRIPTORS: CRAMPING
DESCRIPTORS: ACHING;DISCOMFORT
DESCRIPTORS: ACHING;DISCOMFORT
DESCRIPTORS: TENDER

## 2022-06-16 ASSESSMENT — PAIN DESCRIPTION - FREQUENCY: FREQUENCY: INTERMITTENT

## 2022-06-16 ASSESSMENT — PAIN - FUNCTIONAL ASSESSMENT
PAIN_FUNCTIONAL_ASSESSMENT: PREVENTS OR INTERFERES SOME ACTIVE ACTIVITIES AND ADLS
PAIN_FUNCTIONAL_ASSESSMENT: PREVENTS OR INTERFERES SOME ACTIVE ACTIVITIES AND ADLS

## 2022-06-16 ASSESSMENT — PAIN DESCRIPTION - ONSET: ONSET: ON-GOING

## 2022-06-16 ASSESSMENT — PAIN DESCRIPTION - PAIN TYPE: TYPE: ACUTE PAIN;SURGICAL PAIN

## 2022-06-16 NOTE — CONSULTS
Comprehensive Nutrition Assessment    Type and Reason for Visit:  Consult,Patient Education,Reassess    Nutrition Recommendations/Plan:   1. Recommend TPN d/t NPO/clear liquid diet x 11 days. Please consult RD for PN recommendations PRN      2. Continue current diet, advance as tolerated. 3. Add gelatein BID to better meet pt's pro needs  4. Colostomy DI completed 6/16. Pt appeared groggy during delivery of DI, but was receptive to the info provided in the handout. Malnutrition Assessment:  Malnutrition Status: At risk for malnutrition (Comment) (06/14/22 1045)    Context:  Acute Illness     Findings of the 6 clinical characteristics of malnutrition:  Energy Intake:  75% or less of estimated energy requirements for 7 or more days  Weight Loss:  Unable to assess (d/t +I/Os)     Body Fat Loss:  No significant body fat loss     Muscle Mass Loss:  No significant muscle mass loss    Fluid Accumulation:  No significant fluid accumulation     Strength:  Not Performed    Nutrition Assessment:    Pt now on clear liquid diet, consuming ~50% of liquids, s/p NG/LUC drain removal 6/15. Pt s/p ex lap w/ L hemicolectomy, appendectomy, & colostomy d/t complicated diverticulitis 6/13 w/ plan to return to OR for delayed primary closure. Colostomy DI completed 6/16. Recommend TPN d/t pt at nutritional risk 2/2 primarily NPO/clear liquid diet x11 days. Continue current diet, ADAT, and will plan to add ONS to optimize nutrient intake. Nutrition Related Findings:    A/Ox4, distended/rounded abd, hypo BS, LUQ colostomy + bloody red output, +I/Os 17.0L Wound Type: Surgical Incision       Current Nutrition Intake & Therapies:    Average Meal Intake: 1-25%,26-50%,51-75% (6/16 per pt report)  Average Supplements Intake: None Ordered  ADULT DIET; Clear Liquid;  No Carbonated Beverages    Anthropometric Measures:  Height: 5' 7\" (170.2 cm)  Ideal Body Weight (IBW): 148 lbs (67 kg)    Admission Body Weight: 243 lb (110.2 kg)  Current Body Weight: 245 lb (111.1 kg) (6/16), 165.5 % IBW. Weight Source: Bed Scale  Current BMI (kg/m2): 38.4  Usual Body Weight: 235 lb (106.6 kg) (per patient report)  % Weight Change (Calculated): 11.9  Weight Adjustment For: No Adjustment                 BMI Categories: Obese Class 2 (BMI 35.0 -39.9)    Estimated Daily Nutrient Needs:  Energy Requirements Based On: Kcal/kg  Weight Used for Energy Requirements: Admission  Energy (kcal/day): 2243-0408 kcals/d  Weight Used for Protein Requirements: Ideal  Protein (g/day):  gm/d  Method Used for Fluid Requirements: 1 ml/kcal  Fluid (ml/day): 9574-4752    Nutrition Diagnosis:   · Inadequate oral intake related to altered GI function as evidenced by GI abnormality,NPO or clear liquid status due to medical condition    Nutrition Interventions:   Food and/or Nutrient Delivery: Continue Current Diet,Start Oral Nutrition Supplement (add gelatein BID & recommend pt start TPN d/t primarily NPO/clear liquid diet X 11 days)  Nutrition Education/Counseling: Education completed (colostomy)  Coordination of Nutrition Care: Continue to monitor while inpatient       Goals:  Previous Goal Met: No Progress toward Goal(s)  Goals: other (specify)  Specify Other Goals: nutrition progression    Nutrition Monitoring and Evaluation:   Behavioral-Environmental Outcomes: None Identified  Food/Nutrient Intake Outcomes: Supplement Intake,Food and Nutrient Intake,Diet Advancement/Tolerance  Physical Signs/Symptoms Outcomes: Biochemical Data,Nutrition Focused Physical Findings,Skin,Weight,GI Status,Fluid Status or Edema    Discharge Planning:     Too soon to determine     [address]: [address]97

## 2022-06-16 NOTE — ADT AUTH CERT
Utilization Reviews         Bowel Surgery: Colectomy, Partial, with or without Ostomy - Care Day 3 (6/15/2022) by Josee James LPN       Review Status Review Entered   Completed 6/16/2022 12:37      Criteria Review      Care Day: 3 Care Date: 6/15/2022 Level of Care: Inpatient Floor    Guideline Day 2    Clinical Status    (X) * Procedure completed    6/16/2022 12:37 PM EDT by Leighton Beard      on 6/13/22    (X) * Hemodynamic stability    Activity    (X) * Progressive ambulation    Routes    (X) IV fluids    6/16/2022 12:37 PM EDT by Leighton Beard      dextrose 5 % in lactated ringers infusion  Rate: 100 mL/hr Freq: CONTINUOUS Route: IV    lactated ringers infusion  Rate: 125 mL/hr Freq: CONTINUOUS Route: IV    * Milestone   Additional Notes   6/15/22--CD 3      Vitals: 164/98 P64 R18 98.0F 97% RA       Abnl/Pertinent Labs/Radiology/Diagnostic Studies:      6/15/2022 05:02   WBC: 19.4 (H)   RBC: 4.14   Hemoglobin Quant: 11.5 (L)   Hematocrit: 36.4 (L)      Physical Exam:   General Appearance:  awake, alert, oriented, in no acute distress   Skin:  Skin color, texture, turgor normal   Head/face:  NCAT   Eyes:  No gross abnormalities. Sclera nonicteric   Lungs/Chest:  Normal expansion. No respiratory distress. Heart: Warm throughout. Regular rate    Abdomen:  Soft, appropriately tender, midline wound clean, ostomy pink with some blood in ostomy bag. Extremities: Extremities warm to touch, pink, with no edema.    ----------------------      **GEN SURG      ASSESSMENT AND PLAN       50 y.o. male with complicated diverticulitis s/p exploratory laparotomy with left hemicolectomy, appendectomy with end colostomy 6/13       - antibiotics per ID   - continue CLD Await ROBF   - IVF   - pain/nausea control prn   - ambulate   - local wound care to midline - will plan on delayed primary closure soon   ---------      **ID      PLAN:   · Continue Merrem and Diflucan pending cultures   · Check final cultures   · Monitor labs Medications:      fluconazole (DIFLUCAN) in 0.9 % sodium chloride IVPB 400 mg   Dose: 400 mg   Freq: EVERY 24 HOURS Route: IV   HYDROmorphone (DILAUDID) injection 0.5 mg   Dose: 0.5 mg   Freq: EVERY 3 HOURS PRN Route: IV x5      meropenem, 1,000 mg, IntraVENous, Q8H   insulin lispro, 0-6 Units, SubCUTAneous, 4x Daily AC & HS   pantoprazole (PROTONIX) 40 mg injection, 40 mg, IntraVENous, Q12H   acetaminophen, 1,000 mg, Oral, q8h   methocarbamol IVPB, 500 mg, IntraVENous, Q8H   enoxaparin, 30 mg, SubCUTAneous, BID      Orders:   - IVF   - pain/nausea control prn   - ambulate

## 2022-06-16 NOTE — PROGRESS NOTES
[address] [place] [address] [facility]  Progress Note    SUBJECTIVE:  Chief Complaint   Patient presents with    Abdominal Pain     constipation x 1 week, bloating. Patient is tolerating medications. No reported adverse drug reactions. No nausea, vomiting, diarrhea. Laying in bed, feeling better slowly, family member present  Tolerating clears and oral pills  No fevers    Review of systems:  As stated above in the chief complaint, otherwise negative. Medications:  Scheduled Meds:   meropenem  1,000 mg IntraVENous Q8H    insulin lispro  0-6 Units SubCUTAneous 4x Daily AC & HS    pantoprazole (PROTONIX) 40 mg injection  40 mg IntraVENous Q12H    fluconazole  400 mg IntraVENous Q24H    acetaminophen  1,000 mg Oral q8h    methocarbamol IVPB  500 mg IntraVENous Q8H    enoxaparin  30 mg SubCUTAneous BID     Continuous Infusions:   dextrose 5% in lactated ringers 100 mL/hr at 22 1114    dextrose       PRN Meds:glucose, dextrose bolus **OR** dextrose bolus, glucagon (rDNA), dextrose, HYDROmorphone **OR** [DISCONTINUED] HYDROmorphone, ondansetron    OBJECTIVE:  BP (!) 166/94   Pulse 58   Temp 97.9 °F (36.6 °C) (Oral)   Resp 16   Ht 5' 7\" (1.702 m)   Wt 245 lb (111.1 kg)   SpO2 98%   BMI 38.37 kg/m²   Temp  Av.3 °F (36.8 °C)  Min: 97.9 °F (36.6 °C)  Max: 98.7 °F (37.1 °C)  Constitutional: The patient is awake, alert, and oriented. Laying in bed, feels better  Skin: Warm and dry. No rashes were noted. HEENT: Round and reactive pupils. Moist mucous membranes. No ulcerations or thrush. Neck: Supple to movements. Chest: No use of accessory muscles to breathe. Symmetrical expansion. No wheezing, crackles or rhonchi. Cardiovascular: S1 and S2 are rhythmic and regular. No murmurs appreciated. Abdomen: Hypoactive bowel sounds to auscultation. Benign to palpation. No masses felt. No hepatosplenomegaly.   ABD on the incision line, colostomy with bowel sweat  Extremities: No cyanosis or clubbing 2+ edema  Lines: peripheral    Laboratory and Tests Review:  Lab Results   Component Value Date    WBC 15.0 (H) 06/16/2022    WBC 19.4 (H) 06/15/2022    WBC 27.7 (H) 06/14/2022    HGB 12.1 (L) 06/16/2022    HCT 38.0 06/16/2022    MCV 86.6 06/16/2022     (H) 06/16/2022     Lab Results   Component Value Date    NEUTROABS 10.61 (H) 06/16/2022    NEUTROABS 15.27 (H) 06/15/2022    NEUTROABS 26.32 (H) 06/14/2022     No results found for: [facility]  Lab Results   Component Value Date    ALT 49 (H) 06/05/2022    AST 35 06/05/2022    ALKPHOS 125 06/05/2022    BILITOT 0.7 06/05/2022     Lab Results   Component Value Date     06/16/2022    K 3.8 06/16/2022    K 4.8 06/14/2022     06/16/2022    CO2 27 06/16/2022    BUN 12 06/16/2022    CREATININE 0.9 06/16/2022    CREATININE 1.0 06/15/2022    CREATININE 1.1 06/14/2022    GFRAA >60 06/16/2022    LABGLOM >60 06/16/2022    GLUCOSE 137 06/16/2022    PROT 7.2 06/05/2022    LABALBU 3.7 06/05/2022    CALCIUM 8.4 06/16/2022    BILITOT 0.7 06/05/2022    ALKPHOS 125 06/05/2022    AST 35 06/05/2022    ALT 49 06/05/2022     Lab Results   Component Value Date    CRP 29.6 (H) 06/08/2022     Lab Results   Component Value Date    SEDRATE 62 (H) 06/08/2022     Radiology:  Reviewed    Microbiology:   Blood Cultures 6/12/22: negative so far  MRSA nares: negative  Body Fluid Cultures 6/13/22: Growth not present, incubation continues Gram Stain: no organisms seen    ASSESSMENT:  Diverticulitis with pelvic abscess  Leukocytosis related to undrained abscess  That is post exploratory lap; transverse and colostomy and appendectomy with hernia repair  Cytosis    PLAN:  Continue Merrem and p.o. Diflucan pending cultures  Check final cultures  Monitor labs    MARBIN Guillaume CNP  11:50 AM  6/16/2022     Pt seen and examined. Above discussed agree with advanced practice nurse. Labs, cultures, and radiographs reviewed. Face to Face encounter occurred.  Changes made as necessary.      Noemy Rangel MD

## 2022-06-16 NOTE — PROGRESS NOTES
Revisit. Pouch intact. Dark bloody drainage  Stoma dark red/maroon. Dr. Devika Lund aware  Will follow-plan for perez tomorrow  David Hall.  Lien Flaherty, CNS, Wound Care

## 2022-06-16 NOTE — PROGRESS NOTES
Department of General Surgery - Adult  Surgical Service  Attending Progress Note      SUBJECTIVE: Patient seen and examined at bedside. Patient is tolerating clear liquid diet at this time. No bowel function at this time. Otherwise he is feeling well and had visit with ostomy nurse today. Has been ambulating as well. OBJECTIVE      Physical    VITALS:  /86   Pulse 65   Temp 97.7 °F (36.5 °C) (Oral)   Resp 16   Ht 5' 7\" (1.702 m)   Wt 245 lb (111.1 kg)   SpO2 98%   BMI 38.37 kg/m²     General Appearance:  awake, alert, oriented, in no acute distress  Skin:  Skin color, texture, turgor normal  Head/face:  NCAT  Eyes:  No gross abnormalities. Sclera nonicteric  Lungs/Chest:  Normal expansion. No respiratory distress. Heart: Warm throughout. Regular rate   Abdomen:  Soft, appropriately tender, midline wound clean, ostomy pink with some blood in ostomy bag.   Extremities: Extremities warm to touch, pink, with no edema.     Data  CBC with Differential:    Lab Results   Component Value Date    WBC 15.0 06/16/2022    RBC 4.39 06/16/2022    HGB 12.1 06/16/2022    HCT 38.0 06/16/2022     06/16/2022    MCV 86.6 06/16/2022    MCH 27.6 06/16/2022    MCHC 31.8 06/16/2022    RDW 15.1 06/16/2022    NRBC 0.0 06/13/2022    METASPCT 1.0 06/14/2022    LYMPHOPCT 18.4 06/16/2022    MONOPCT 5.7 06/16/2022    MYELOPCT 1.0 06/14/2022    BASOPCT 0.3 06/16/2022    MONOSABS 0.86 06/16/2022    LYMPHSABS 2.76 06/16/2022    EOSABS 0.39 06/16/2022    BASOSABS 0.05 06/16/2022     BMP:    Lab Results   Component Value Date     06/16/2022    K 3.8 06/16/2022    K 4.8 06/14/2022     06/16/2022    CO2 27 06/16/2022    BUN 12 06/16/2022    LABALBU 3.7 06/05/2022    CREATININE 0.9 06/16/2022    CALCIUM 8.4 06/16/2022    GFRAA >60 06/16/2022    LABGLOM >60 06/16/2022    GLUCOSE 137 06/16/2022       ASSESSMENT AND PLAN    50 y.o. male with complicated diverticulitis s/p exploratory laparotomy with left hemicolectomy, appendectomy with end colostomy 6/13     - antibiotics per ID, leukocytosis improving  - continue CLD Await ROBF  - IVF  - pain/nausea control prn  - ambulate  - local wound care to midline - will plan on delayed primary closure, today or tomorrow.     Lidia Fabry, MD

## 2022-06-16 NOTE — PATIENT CARE CONFERENCE
P Quality Flow/Interdisciplinary Rounds Progress Note        Quality Flow Rounds held on June 16, 2022    Disciplines Attending:  Bedside Nurse, ,  and Nursing Unit Leadership    Yinka Bhatia was admitted on 6/5/2022 11:55 AM    Anticipated Discharge Date:  Expected Discharge Date: 06/16/22    Disposition:    Boubacar Score:  Boubacar Scale Score: 19    Readmission Risk              Risk of Unplanned Readmission:  14           Discussed patient goal for the day, patient clinical progression, and barriers to discharge.   The following Goal(s) of the Day/Commitment(s) have been identified:  Diagnostics - Report Results      Wild Nair RN  June 16, 2022

## 2022-06-16 NOTE — PROGRESS NOTES
Patient ostomy leaking on bed. Velcro not sticking appropriately. Re-applied and ostomy is functioning. Bedside RN notified.        Gerhardt Lamy, BSN, RN  6/16/2022  11:08 AM

## 2022-06-16 NOTE — ANESTHESIA POSTPROCEDURE EVALUATION
Department of Anesthesiology  Postprocedure Note    Patient: Lillian Shahid  MRN: [id number]  YOB: 1975  Date of evaluation: 6/16/2022  Time:  9:00 AM     Procedure Summary     Date: 06/13/22 Room / Location: [facility] OR [address] [place] [address]    Anesthesia Start: 5515 Anesthesia Stop: 1642    Procedure: OPEN EXTENDED LEFT HEMICOLECTOMY WITH COLOSTOMY, HERNIA REPAIR (N/A Abdomen) Diagnosis:       Diverticulitis      (Diverticulitis [K57.92])    Surgeons: Shavon Harry MD Responsible Provider: Linda Cleveland MD    Anesthesia Type: general ASA Status: 3 - Emergent          Anesthesia Type: general    Myranda Phase I: Myranda Score: 9    Myranda Phase II:      Last vitals: Reviewed and per EMR flowsheets.        Anesthesia Post Evaluation    Patient location during evaluation: PACU  Patient participation: complete - patient participated  Level of consciousness: awake and alert  Airway patency: patent  Nausea & Vomiting: no nausea and no vomiting  Complications: no  Cardiovascular status: hemodynamically stable  Respiratory status: acceptable  Hydration status: stable

## 2022-06-17 LAB
ANION GAP SERPL CALCULATED.3IONS-SCNC: 12 MMOL/L (ref 7–16)
BASOPHILS ABSOLUTE: 0.07 E9/L (ref 0–0.2)
BASOPHILS RELATIVE PERCENT: 0.6 % (ref 0–2)
BLOOD CULTURE, ROUTINE: NORMAL
BODY FLUID CULTURE, STERILE: NORMAL
BODY FLUID CULTURE, STERILE: NORMAL
BUN BLDV-MCNC: 9 MG/DL (ref 6–20)
CALCIUM SERPL-MCNC: 8.5 MG/DL (ref 8.6–10.2)
CHLORIDE BLD-SCNC: 98 MMOL/L (ref 98–107)
CO2: 25 MMOL/L (ref 22–29)
CREAT SERPL-MCNC: 0.9 MG/DL (ref 0.7–1.2)
CULTURE, BLOOD 2: NORMAL
EOSINOPHILS ABSOLUTE: 0.43 E9/L (ref 0.05–0.5)
EOSINOPHILS RELATIVE PERCENT: 3.4 % (ref 0–6)
GFR AFRICAN AMERICAN: >60
GFR NON-AFRICAN AMERICAN: >60 ML/MIN/1.73
GLUCOSE BLD-MCNC: 112 MG/DL (ref 74–99)
GRAM STAIN RESULT: NORMAL
GRAM STAIN RESULT: NORMAL
HCT VFR BLD CALC: 33.6 % (ref 37–54)
HEMOGLOBIN: 10.9 G/DL (ref 12.5–16.5)
IMMATURE GRANULOCYTES #: 0.28 E9/L
IMMATURE GRANULOCYTES %: 2.2 % (ref 0–5)
LYMPHOCYTES ABSOLUTE: 2.54 E9/L (ref 1.5–4)
LYMPHOCYTES RELATIVE PERCENT: 20.2 % (ref 20–42)
MAGNESIUM: 2 MG/DL (ref 1.6–2.6)
MCH RBC QN AUTO: 27.5 PG (ref 26–35)
MCHC RBC AUTO-ENTMCNC: 32.4 % (ref 32–34.5)
MCV RBC AUTO: 84.8 FL (ref 80–99.9)
METER GLUCOSE: 107 MG/DL (ref 74–99)
METER GLUCOSE: 109 MG/DL (ref 74–99)
METER GLUCOSE: 113 MG/DL (ref 74–99)
METER GLUCOSE: 129 MG/DL (ref 74–99)
MONOCYTES ABSOLUTE: 0.91 E9/L (ref 0.1–0.95)
MONOCYTES RELATIVE PERCENT: 7.2 % (ref 2–12)
NEUTROPHILS ABSOLUTE: 8.35 E9/L (ref 1.8–7.3)
NEUTROPHILS RELATIVE PERCENT: 66.4 % (ref 43–80)
PDW BLD-RTO: 14.9 FL (ref 11.5–15)
PHOSPHORUS: 3.2 MG/DL (ref 2.5–4.5)
PLATELET # BLD: 589 E9/L (ref 130–450)
PMV BLD AUTO: 9.6 FL (ref 7–12)
POTASSIUM SERPL-SCNC: 4 MMOL/L (ref 3.5–5)
RBC # BLD: 3.96 E12/L (ref 3.8–5.8)
SODIUM BLD-SCNC: 135 MMOL/L (ref 132–146)
WBC # BLD: 12.6 E9/L (ref 4.5–11.5)

## 2022-06-17 PROCEDURE — 6370000000 HC RX 637 (ALT 250 FOR IP): Performed by: STUDENT IN AN ORGANIZED HEALTH CARE EDUCATION/TRAINING PROGRAM

## 2022-06-17 PROCEDURE — 2580000003 HC RX 258: Performed by: STUDENT IN AN ORGANIZED HEALTH CARE EDUCATION/TRAINING PROGRAM

## 2022-06-17 PROCEDURE — 6360000002 HC RX W HCPCS: Performed by: STUDENT IN AN ORGANIZED HEALTH CARE EDUCATION/TRAINING PROGRAM

## 2022-06-17 PROCEDURE — 2580000003 HC RX 258

## 2022-06-17 PROCEDURE — C9113 INJ PANTOPRAZOLE SODIUM, VIA: HCPCS | Performed by: STUDENT IN AN ORGANIZED HEALTH CARE EDUCATION/TRAINING PROGRAM

## 2022-06-17 PROCEDURE — 2500000003 HC RX 250 WO HCPCS: Performed by: STUDENT IN AN ORGANIZED HEALTH CARE EDUCATION/TRAINING PROGRAM

## 2022-06-17 PROCEDURE — 6360000002 HC RX W HCPCS: Performed by: SPECIALIST

## 2022-06-17 PROCEDURE — 82962 GLUCOSE BLOOD TEST: CPT

## 2022-06-17 PROCEDURE — 36415 COLL VENOUS BLD VENIPUNCTURE: CPT

## 2022-06-17 PROCEDURE — 83735 ASSAY OF MAGNESIUM: CPT

## 2022-06-17 PROCEDURE — 6360000002 HC RX W HCPCS: Performed by: SURGERY

## 2022-06-17 PROCEDURE — 97530 THERAPEUTIC ACTIVITIES: CPT

## 2022-06-17 PROCEDURE — 6370000000 HC RX 637 (ALT 250 FOR IP)

## 2022-06-17 PROCEDURE — 85025 COMPLETE CBC W/AUTO DIFF WBC: CPT

## 2022-06-17 PROCEDURE — A4216 STERILE WATER/SALINE, 10 ML: HCPCS | Performed by: STUDENT IN AN ORGANIZED HEALTH CARE EDUCATION/TRAINING PROGRAM

## 2022-06-17 PROCEDURE — 1200000000 HC SEMI PRIVATE

## 2022-06-17 PROCEDURE — 84100 ASSAY OF PHOSPHORUS: CPT

## 2022-06-17 PROCEDURE — 6360000002 HC RX W HCPCS

## 2022-06-17 PROCEDURE — 80048 BASIC METABOLIC PNL TOTAL CA: CPT

## 2022-06-17 PROCEDURE — 2580000003 HC RX 258: Performed by: SPECIALIST

## 2022-06-17 RX ORDER — LIDOCAINE HYDROCHLORIDE AND EPINEPHRINE 10; 10 MG/ML; UG/ML
20 INJECTION, SOLUTION INFILTRATION; PERINEURAL ONCE
Status: COMPLETED | OUTPATIENT
Start: 2022-06-17 | End: 2022-06-17

## 2022-06-17 RX ORDER — LIDOCAINE HYDROCHLORIDE AND EPINEPHRINE 10; 10 MG/ML; UG/ML
20 INJECTION, SOLUTION INFILTRATION; PERINEURAL ONCE
Status: DISCONTINUED | OUTPATIENT
Start: 2022-06-17 | End: 2022-06-17 | Stop reason: SDUPTHER

## 2022-06-17 RX ADMIN — ACETAMINOPHEN 1000 MG: 500 TABLET ORAL at 08:59

## 2022-06-17 RX ADMIN — MEROPENEM 1000 MG: 1 INJECTION, POWDER, FOR SOLUTION INTRAVENOUS at 02:57

## 2022-06-17 RX ADMIN — MEROPENEM 1000 MG: 1 INJECTION, POWDER, FOR SOLUTION INTRAVENOUS at 11:49

## 2022-06-17 RX ADMIN — LIDOCAINE HYDROCHLORIDE AND EPINEPHRINE 20 ML: 10; 10 INJECTION, SOLUTION INFILTRATION; PERINEURAL at 17:00

## 2022-06-17 RX ADMIN — SODIUM CHLORIDE, SODIUM LACTATE, POTASSIUM CHLORIDE, CALCIUM CHLORIDE AND DEXTROSE MONOHYDRATE: 5; 600; 310; 30; 20 INJECTION, SOLUTION INTRAVENOUS at 19:41

## 2022-06-17 RX ADMIN — HYDROMORPHONE HYDROCHLORIDE 0.5 MG: 1 INJECTION, SOLUTION INTRAMUSCULAR; INTRAVENOUS; SUBCUTANEOUS at 22:52

## 2022-06-17 RX ADMIN — SODIUM CHLORIDE, PRESERVATIVE FREE 40 MG: 5 INJECTION INTRAVENOUS at 18:45

## 2022-06-17 RX ADMIN — HYDROMORPHONE HYDROCHLORIDE 0.5 MG: 1 INJECTION, SOLUTION INTRAMUSCULAR; INTRAVENOUS; SUBCUTANEOUS at 10:52

## 2022-06-17 RX ADMIN — SODIUM CHLORIDE, PRESERVATIVE FREE 40 MG: 5 INJECTION INTRAVENOUS at 05:56

## 2022-06-17 RX ADMIN — HYDROMORPHONE HYDROCHLORIDE 0.5 MG: 1 INJECTION, SOLUTION INTRAMUSCULAR; INTRAVENOUS; SUBCUTANEOUS at 16:22

## 2022-06-17 RX ADMIN — HYDROMORPHONE HYDROCHLORIDE 0.5 MG: 1 INJECTION, SOLUTION INTRAMUSCULAR; INTRAVENOUS; SUBCUTANEOUS at 01:37

## 2022-06-17 RX ADMIN — MEROPENEM 1000 MG: 1 INJECTION, POWDER, FOR SOLUTION INTRAVENOUS at 19:40

## 2022-06-17 RX ADMIN — SODIUM CHLORIDE, SODIUM LACTATE, POTASSIUM CHLORIDE, CALCIUM CHLORIDE AND DEXTROSE MONOHYDRATE: 5; 600; 310; 30; 20 INJECTION, SOLUTION INTRAVENOUS at 09:08

## 2022-06-17 RX ADMIN — ACETAMINOPHEN 1000 MG: 500 TABLET ORAL at 16:22

## 2022-06-17 RX ADMIN — METHOCARBAMOL 500 MG: 100 INJECTION, SOLUTION INTRAMUSCULAR; INTRAVENOUS at 18:46

## 2022-06-17 RX ADMIN — FLUCONAZOLE 400 MG: 150 TABLET ORAL at 09:00

## 2022-06-17 RX ADMIN — ENOXAPARIN SODIUM 30 MG: 100 INJECTION SUBCUTANEOUS at 21:05

## 2022-06-17 RX ADMIN — METHOCARBAMOL 500 MG: 100 INJECTION, SOLUTION INTRAMUSCULAR; INTRAVENOUS at 02:24

## 2022-06-17 RX ADMIN — METHOCARBAMOL 500 MG: 100 INJECTION, SOLUTION INTRAMUSCULAR; INTRAVENOUS at 11:01

## 2022-06-17 ASSESSMENT — PAIN DESCRIPTION - ONSET
ONSET: ON-GOING
ONSET: ON-GOING

## 2022-06-17 ASSESSMENT — PAIN DESCRIPTION - FREQUENCY
FREQUENCY: INTERMITTENT
FREQUENCY: INTERMITTENT

## 2022-06-17 ASSESSMENT — PAIN DESCRIPTION - DESCRIPTORS
DESCRIPTORS: TENDER;DISCOMFORT
DESCRIPTORS: THROBBING
DESCRIPTORS: ACHING;DISCOMFORT;THROBBING

## 2022-06-17 ASSESSMENT — PAIN DESCRIPTION - LOCATION
LOCATION: ABDOMEN

## 2022-06-17 ASSESSMENT — PAIN SCALES - GENERAL
PAINLEVEL_OUTOF10: 3
PAINLEVEL_OUTOF10: 8
PAINLEVEL_OUTOF10: 4
PAINLEVEL_OUTOF10: 5
PAINLEVEL_OUTOF10: 3

## 2022-06-17 ASSESSMENT — PAIN - FUNCTIONAL ASSESSMENT
PAIN_FUNCTIONAL_ASSESSMENT: PREVENTS OR INTERFERES SOME ACTIVE ACTIVITIES AND ADLS
PAIN_FUNCTIONAL_ASSESSMENT: ACTIVITIES ARE NOT PREVENTED
PAIN_FUNCTIONAL_ASSESSMENT: ACTIVITIES ARE NOT PREVENTED

## 2022-06-17 ASSESSMENT — PAIN DESCRIPTION - PAIN TYPE
TYPE: ACUTE PAIN;SURGICAL PAIN
TYPE: SURGICAL PAIN

## 2022-06-17 ASSESSMENT — PAIN DESCRIPTION - ORIENTATION
ORIENTATION: MID
ORIENTATION: MID

## 2022-06-17 NOTE — PLAN OF CARE
Problem: Pain  Goal: Verbalizes/displays adequate comfort level or baseline comfort level  6/17/2022 1200 by Jyotsna Walker RN  Outcome: Progressing  6/16/2022 2226 by Raven Cross RN  Outcome: Progressing     Problem: Safety - Adult  Goal: Free from fall injury  6/17/2022 1200 by Jyotsna Walker RN  Outcome: Progressing  6/16/2022 2226 by Raven Cross RN  Outcome: Progressing     Problem: Anxiety  Goal: Will report anxiety at manageable levels  Description: INTERVENTIONS:  1. Administer medication as ordered  2. Teach and rehearse alternative coping skills  3.  Provide emotional support with 1:1 interaction with staff  6/17/2022 1200 by Jyotsna Walker RN  Outcome: Progressing  6/16/2022 2226 by Raven Cross RN  Outcome: Progressing     Problem: ABCDS Injury Assessment  Goal: Absence of physical injury  6/17/2022 1200 by Jyotsna Walker RN  Outcome: Progressing  6/16/2022 2226 by Raven Cross RN  Outcome: Progressing     Problem: Discharge Planning  Goal: Discharge to home or other facility with appropriate resources  6/16/2022 2226 by Raven Cross RN  Outcome: Progressing Normal unassisted gait/Motor strength normal in all extremities/Affect appropriate/Interactive/Sensation intact to touch

## 2022-06-17 NOTE — PATIENT CARE CONFERENCE
[facility] Quality Flow/Interdisciplinary Rounds Progress Note        Quality Flow Rounds held on June 17, 2022    Disciplines Attending:  Bedside Nurse, ,  and Nursing Unit Leadership    Hayde Raza was admitted on 6/5/2022 11:55 AM    Anticipated Discharge Date:  Expected Discharge Date: 06/16/22    Disposition:    Boubacar Score:  Boubacar Scale Score: 20    Readmission Risk              Risk of Unplanned Readmission:  14           Discussed patient goal for the day, patient clinical progression, and barriers to discharge.   The following Goal(s) of the Day/Commitment(s) have been identified:  Closure of incision      Liset Franklin RN  June 17, 2022

## 2022-06-17 NOTE — CARE COORDINATION
Social Work discharge [facility] continues to follow pt for discharge planning. Pt said plan is either home or to family's home. [facility] home care following to start c for ostomy care at discharge, and possible home iv atb. Pt for surgery procedure today per IDR.   Electronically signed by Mary Alice Bland on 6/17/2022 at 11:28 AM

## 2022-06-17 NOTE — PROGRESS NOTES
Consulted for peripheral IV site change, RN states left antecubital site is sluggish . I noted a 2x2 cm reddened area above the insertion site. Patient denies discomfort. Fluid turned off. New site placed.,.  Staff notified of above.   6/17/2022 2:21 PM Lexx Martinez RN [facility]

## 2022-06-17 NOTE — PROGRESS NOTES
Physical Therapy  Facility/Department: [facility] MED SURG  Physical Therapy Treatment Note    Name: Sanju Conway  : 1975  MRN: [id number]  Date of Service: 2022      Patient Diagnosis(es): The primary encounter diagnosis was Diverticulitis of colon. Diagnoses of Fatty liver, Kidney cysts, Umbilical hernia without obstruction and without gangrene, and Diverticulitis were also pertinent to this visit. Past Medical History:  has a past medical history of Depression and Hypertension. Past Surgical History:  has a past surgical history that includes Tonsillectomy; Colonoscopy (N/A, 2022); and laparotomy (N/A, 2022). Evaluating Therapist: Almaz Fam PT    Room #:  6313/2983-S  Diagnosis:  Diverticulitis [K57.92]  Procedure/Surgery:   . EXPLORATORY LAPAROTOMY  2. MOBILIZATION OF SPLENIC FLEXURE  3. OPEN LEFT HEMICOLECTOMY  4. TRANSVERSE END COLOSTOMY  5. APPENDECTOMY  6. PARTIAL OMENTECTOMY  7. PRIMARY REPAIR OF UMBILICAL HERNIA  Precautions:  Falls, colostomy      Social:  Pt lives alone but could go to his parents home at discharge. Both homes are 1 floor. Pt independent all areas prior to admit. Initial Evaluation  Date: 6/15/22 Treatment   2022   Short Term/ Long Term   Goals   Was pt agreeable to Eval/treatment? yes yes    Does pt have pain?  4/10 abdominal pain Tolerable abdominal pain    Bed Mobility  Rolling: NT  Supine to sit: NT  Sit to supine: SBA  Scooting: SBA Rolling: Min A  Supine to sit: Mod A  Scooting: Min A seated to EOB independent   Transfers Sit to stand: CGA  Stand to sit: CGA  Stand pivot: CGA Sit <> stand: Min A independent   Ambulation    40 feet with ww with CGA 80 feet x 1 using WW for support CGA for balance 200 feet with device as needed independent   Stair Negotiation  Ascended and descended  NT   1 steps with 1 rail independnet   LE strength     4/5    4+/5   balance      Fair+     AM-PAC Raw score                         Pt is alert and able to follow instruction  Balance: fair minus dynamic using [employer] for support    Pt performed therapeutic exercise of the following: NT    Patient education  Pt was educated on UE usage to assist with transfers, bed mobility promoting log rolling OOB    Patient response to education:   Pt verbalized understanding Pt demonstrated skill Pt requires further education in this area   yes With instruction yes     ASSESSMENT:   Comments: Nurse ok with rx. Pt found in bed, agreed to rx. Pt assisted to EOB, pillow used to splint abdominal wound. Pt sat EOB SBA. Gait to the hallway, hailee slow and consistent. Pt displayed mild unsteadiness, required light hands on assist for balance and safety. Pt fatigued after activity. Pt to surgery later today for would closure. Treatment: Pt practiced and was instructed in the following treatment: bed mobility, transfer safety    Pt was left in a bedside chair with call light in reach    Time in 0923   Time out 0938   Total Treatment Time 15 minutes   CPT codes:     Therapeutic activities 26056 15 minutes   Therapeutic exercises 74401 0 minutes       Pt is making fair progress toward established Physical Therapy goals. Continue with physical therapy current plan of care.     Malena Rios [facility]   License Number: [id number]

## 2022-06-17 NOTE — PROGRESS NOTES
Department of General Surgery - Adult  Surgical Service      SUBJECTIVE: Patient feeling good this morning, no n/v. Tolerated clear liquids. No significant ostomy output yet. OBJECTIVE      Physical    VITALS:  BP (!) 157/87   Pulse 60   Temp 97.9 °F (36.6 °C) (Oral)   Resp 18   Ht 5' 7\" (1.702 m)   Wt 244 lb (110.7 kg)   SpO2 97%   BMI 38.22 kg/m²     General Appearance:  awake, alert, oriented, in no acute distress  Skin:  Skin color, texture, turgor normal  Head/face:  NCAT  Eyes:  No gross abnormalities. Sclera nonicteric  Lungs/Chest:  Normal expansion. No respiratory distress. Heart: Warm throughout. Regular rate   Abdomen:  Soft, appropriately tender, midline wound clean, ostomy pink with some blood in ostomy bag.   Extremities: Extremities warm to touch, pink, with no edema.     Data  CBC with Differential:    Lab Results   Component Value Date    WBC 12.6 06/17/2022    RBC 3.96 06/17/2022    HGB 10.9 06/17/2022    HCT 33.6 06/17/2022     06/17/2022    MCV 84.8 06/17/2022    MCH 27.5 06/17/2022    MCHC 32.4 06/17/2022    RDW 14.9 06/17/2022    NRBC 0.0 06/13/2022    METASPCT 1.0 06/14/2022    LYMPHOPCT 20.2 06/17/2022    MONOPCT 7.2 06/17/2022    MYELOPCT 1.0 06/14/2022    BASOPCT 0.6 06/17/2022    MONOSABS 0.91 06/17/2022    LYMPHSABS 2.54 06/17/2022    EOSABS 0.43 06/17/2022    BASOSABS 0.07 06/17/2022     BMP:    Lab Results   Component Value Date     06/17/2022    K 4.0 06/17/2022    K 4.8 06/14/2022    CL 98 06/17/2022    CO2 25 06/17/2022    BUN 9 06/17/2022    LABALBU 3.7 06/05/2022    CREATININE 0.9 06/17/2022    CALCIUM 8.5 06/17/2022    GFRAA >60 06/17/2022    LABGLOM >60 06/17/2022    GLUCOSE 112 06/17/2022       ASSESSMENT AND PLAN    50 y.o. male with complicated diverticulitis s/p exploratory laparotomy with left hemicolectomy, appendectomy with end colostomy 6/13     - antibiotics per ID  - advance to full liquids   - IVF  - pain/nausea control prn  - ambulate  - plan on delayed primary closure of midline wound at bedside today    Matias Prater MD

## 2022-06-17 NOTE — FLOWSHEET NOTE
Revisit for lesson  Patient with new transverse colostomy. 06/17/22 1816   Colostomy LUQ   Placement Date/Time: 06/13/22 9982   Location: LUQ   Stomal Appliance 2 piece; Changed;Leaking   Stoma  Assessment   (red)   Peristomal Assessment Intact   Stool Appearance   (none, bloody drainage)   states just had wound closed, not ready to change pouch. Bleeding ate mucocutaneous junction. No stool in pouch. Patient had pouch from 2 piece. No wafer was in place. 2 piece red flex with barrier ring applied. Patient verbalized understanding. Ate lunch today  Will follow  Placido Crenshaw.  Felicitas Pang, SALLY, Wound Care

## 2022-06-17 NOTE — PROCEDURES
Delayed Primary Closure Procedure Note    Patient midline wound was prepped and draped in the usual sterile fashion. Skin was infiltrate with 1% lidocaine with epinephrine. Wound was thoroughly irrigated with sterile saline. Wound was closed using large skin staples. Patient tolerated procedure well. No complications. Sterile gauze dressing applied.     Electronically signed by Marry Pacheco MD on 6/17/22 at 5:17 PM EDT

## 2022-06-17 NOTE — PROGRESS NOTES
[address] [place] [address] [facility]  Progress Note    SUBJECTIVE:  Chief Complaint   Patient presents with    Abdominal Pain     constipation x 1 week, bloating. Patient is tolerating medications. No reported adverse drug reactions. No nausea, vomiting, diarrhea. Sitting up in chair, feels better sitting up and moving around   No fevers    Review of systems:  As stated above in the chief complaint, otherwise negative. Medications:  Scheduled Meds:   lidocaine-EPINEPHrine  20 mL IntraDERmal Once    fluconazole  400 mg Oral Daily    lidocaine-EPINEPHrine  20 mL IntraDERmal Once    meropenem  1,000 mg IntraVENous Q8H    insulin lispro  0-6 Units SubCUTAneous 4x Daily AC & HS    pantoprazole (PROTONIX) 40 mg injection  40 mg IntraVENous Q12H    acetaminophen  1,000 mg Oral q8h    methocarbamol IVPB  500 mg IntraVENous Q8H    enoxaparin  30 mg SubCUTAneous BID     Continuous Infusions:   dextrose 5% in lactated ringers 50 mL/hr at 22 1104    dextrose       PRN Meds:glucose, dextrose bolus **OR** dextrose bolus, glucagon (rDNA), dextrose, HYDROmorphone **OR** [DISCONTINUED] HYDROmorphone, ondansetron    OBJECTIVE:  BP (!) 150/70   Pulse 66   Temp 98.7 °F (37.1 °C) (Oral)   Resp 18   Ht 5' 7\" (1.702 m)   Wt 244 lb (110.7 kg)   SpO2 97%   BMI 38.22 kg/m²   Temp  Av.2 °F (36.8 °C)  Min: 97.9 °F (36.6 °C)  Max: 98.7 °F (37.1 °C)  Constitutional: The patient is awake, alert, and oriented. Sitting in chair, feels better  Skin: Warm and dry. No rashes were noted. HEENT: Round and reactive pupils. Moist mucous membranes. No ulcerations or thrush. Neck: Supple to movements. Chest: No use of accessory muscles to breathe. Symmetrical expansion. No wheezing, crackles or rhonchi. Cardiovascular: S1 and S2 are rhythmic and regular. No murmurs appreciated. Abdomen: Hypoactive bowel sounds to auscultation. Benign to palpation. No masses felt. No hepatosplenomegaly.   ABD on the incision line, colostomy with bowel sweat  Extremities: No cyanosis or clubbing 2+ edema  Lines: peripheral    Laboratory and Tests Review:  Lab Results   Component Value Date    WBC 12.6 (H) 06/17/2022    WBC 15.0 (H) 06/16/2022    WBC 19.4 (H) 06/15/2022    HGB 10.9 (L) 06/17/2022    HCT 33.6 (L) 06/17/2022    MCV 84.8 06/17/2022     (H) 06/17/2022     Lab Results   Component Value Date    NEUTROABS 8.35 (H) 06/17/2022    NEUTROABS 10.61 (H) 06/16/2022    NEUTROABS 15.27 (H) 06/15/2022     No results found for: [facility]  Lab Results   Component Value Date    ALT 49 (H) 06/05/2022    AST 35 06/05/2022    ALKPHOS 125 06/05/2022    BILITOT 0.7 06/05/2022     Lab Results   Component Value Date     06/17/2022    K 4.0 06/17/2022    K 4.8 06/14/2022    CL 98 06/17/2022    CO2 25 06/17/2022    BUN 9 06/17/2022    CREATININE 0.9 06/17/2022    CREATININE 0.9 06/16/2022    CREATININE 1.0 06/15/2022    GFRAA >60 06/17/2022    LABGLOM >60 06/17/2022    GLUCOSE 112 06/17/2022    PROT 7.2 06/05/2022    LABALBU 3.7 06/05/2022    CALCIUM 8.5 06/17/2022    BILITOT 0.7 06/05/2022    ALKPHOS 125 06/05/2022    AST 35 06/05/2022    ALT 49 06/05/2022     Lab Results   Component Value Date    CRP 29.6 (H) 06/08/2022     Lab Results   Component Value Date    SEDRATE 62 (H) 06/08/2022     Radiology:  Reviewed    Microbiology:   Blood Cultures 6/12/22: negative so far  MRSA nares: negative  Body Fluid Cultures 6/13/22: Growth not present, incubation continues Gram Stain: no organisms seen    ASSESSMENT:  · Diverticulitis with pelvic abscess  · Leukocytosis related to undrained abscess  · That is post exploratory lap; transverse and colostomy and appendectomy with hernia repair  · Leukocytosis-improving    PLAN:  · Continue Merrem until tomorrow night after surgical closure is complete   · Continue p.o.  Diflucan's-several more days  · For delayed primary wound closure at bedside today with surgery  · Check final cultures  · Monitor labs    MARBIN Andrade - CNP  12:39 PM  6/17/2022     Pt seen and examined. Above discussed agree with advanced practice nurse. Labs, cultures, and radiographs reviewed. Face to Face encounter occurred. Changes made as necessary.      Margarita Pierce MD

## 2022-06-18 LAB
ANION GAP SERPL CALCULATED.3IONS-SCNC: 11 MMOL/L (ref 7–16)
BASOPHILS ABSOLUTE: 0.1 E9/L (ref 0–0.2)
BASOPHILS RELATIVE PERCENT: 0.8 % (ref 0–2)
BUN BLDV-MCNC: 8 MG/DL (ref 6–20)
CALCIUM SERPL-MCNC: 8.6 MG/DL (ref 8.6–10.2)
CHLORIDE BLD-SCNC: 99 MMOL/L (ref 98–107)
CO2: 25 MMOL/L (ref 22–29)
CREAT SERPL-MCNC: 0.9 MG/DL (ref 0.7–1.2)
EOSINOPHILS ABSOLUTE: 0.31 E9/L (ref 0.05–0.5)
EOSINOPHILS RELATIVE PERCENT: 2.5 % (ref 0–6)
GFR AFRICAN AMERICAN: >60
GFR NON-AFRICAN AMERICAN: >60 ML/MIN/1.73
GLUCOSE BLD-MCNC: 127 MG/DL (ref 74–99)
HCT VFR BLD CALC: 33.6 % (ref 37–54)
HEMOGLOBIN: 10.4 G/DL (ref 12.5–16.5)
IMMATURE GRANULOCYTES #: 0.26 E9/L
IMMATURE GRANULOCYTES %: 2.1 % (ref 0–5)
LYMPHOCYTES ABSOLUTE: 2.6 E9/L (ref 1.5–4)
LYMPHOCYTES RELATIVE PERCENT: 20.8 % (ref 20–42)
MAGNESIUM: 1.9 MG/DL (ref 1.6–2.6)
MCH RBC QN AUTO: 27.3 PG (ref 26–35)
MCHC RBC AUTO-ENTMCNC: 31 % (ref 32–34.5)
MCV RBC AUTO: 88.2 FL (ref 80–99.9)
METER GLUCOSE: 127 MG/DL (ref 74–99)
METER GLUCOSE: 98 MG/DL (ref 74–99)
MONOCYTES ABSOLUTE: 0.93 E9/L (ref 0.1–0.95)
MONOCYTES RELATIVE PERCENT: 7.5 % (ref 2–12)
NEUTROPHILS ABSOLUTE: 8.28 E9/L (ref 1.8–7.3)
NEUTROPHILS RELATIVE PERCENT: 66.3 % (ref 43–80)
PDW BLD-RTO: 14.8 FL (ref 11.5–15)
PHOSPHORUS: 3.4 MG/DL (ref 2.5–4.5)
PLATELET # BLD: 597 E9/L (ref 130–450)
PMV BLD AUTO: 9.7 FL (ref 7–12)
POTASSIUM SERPL-SCNC: 4.1 MMOL/L (ref 3.5–5)
RBC # BLD: 3.81 E12/L (ref 3.8–5.8)
SODIUM BLD-SCNC: 135 MMOL/L (ref 132–146)
WBC # BLD: 12.5 E9/L (ref 4.5–11.5)

## 2022-06-18 PROCEDURE — 6360000002 HC RX W HCPCS: Performed by: STUDENT IN AN ORGANIZED HEALTH CARE EDUCATION/TRAINING PROGRAM

## 2022-06-18 PROCEDURE — 2580000003 HC RX 258

## 2022-06-18 PROCEDURE — C9113 INJ PANTOPRAZOLE SODIUM, VIA: HCPCS | Performed by: STUDENT IN AN ORGANIZED HEALTH CARE EDUCATION/TRAINING PROGRAM

## 2022-06-18 PROCEDURE — 2580000003 HC RX 258: Performed by: STUDENT IN AN ORGANIZED HEALTH CARE EDUCATION/TRAINING PROGRAM

## 2022-06-18 PROCEDURE — 82962 GLUCOSE BLOOD TEST: CPT

## 2022-06-18 PROCEDURE — 6370000000 HC RX 637 (ALT 250 FOR IP): Performed by: STUDENT IN AN ORGANIZED HEALTH CARE EDUCATION/TRAINING PROGRAM

## 2022-06-18 PROCEDURE — 1200000000 HC SEMI PRIVATE

## 2022-06-18 PROCEDURE — 6360000002 HC RX W HCPCS: Performed by: SURGERY

## 2022-06-18 PROCEDURE — A4216 STERILE WATER/SALINE, 10 ML: HCPCS | Performed by: STUDENT IN AN ORGANIZED HEALTH CARE EDUCATION/TRAINING PROGRAM

## 2022-06-18 PROCEDURE — 36415 COLL VENOUS BLD VENIPUNCTURE: CPT

## 2022-06-18 PROCEDURE — 85025 COMPLETE CBC W/AUTO DIFF WBC: CPT

## 2022-06-18 PROCEDURE — 6370000000 HC RX 637 (ALT 250 FOR IP)

## 2022-06-18 PROCEDURE — 83735 ASSAY OF MAGNESIUM: CPT

## 2022-06-18 PROCEDURE — 80048 BASIC METABOLIC PNL TOTAL CA: CPT

## 2022-06-18 PROCEDURE — 84100 ASSAY OF PHOSPHORUS: CPT

## 2022-06-18 PROCEDURE — 6360000002 HC RX W HCPCS

## 2022-06-18 RX ORDER — SODIUM CHLORIDE 0.9 % (FLUSH) 0.9 %
SYRINGE (ML) INJECTION
Status: COMPLETED
Start: 2022-06-18 | End: 2022-06-18

## 2022-06-18 RX ADMIN — SODIUM CHLORIDE, PRESERVATIVE FREE 40 MG: 5 INJECTION INTRAVENOUS at 18:59

## 2022-06-18 RX ADMIN — ENOXAPARIN SODIUM 30 MG: 100 INJECTION SUBCUTANEOUS at 08:21

## 2022-06-18 RX ADMIN — SODIUM CHLORIDE, PRESERVATIVE FREE 40 MG: 5 INJECTION INTRAVENOUS at 06:14

## 2022-06-18 RX ADMIN — SODIUM CHLORIDE, SODIUM LACTATE, POTASSIUM CHLORIDE, CALCIUM CHLORIDE AND DEXTROSE MONOHYDRATE: 5; 600; 310; 30; 20 INJECTION, SOLUTION INTRAVENOUS at 11:59

## 2022-06-18 RX ADMIN — FLUCONAZOLE 400 MG: 150 TABLET ORAL at 08:21

## 2022-06-18 RX ADMIN — ACETAMINOPHEN 1000 MG: 500 TABLET ORAL at 16:50

## 2022-06-18 RX ADMIN — METHOCARBAMOL 500 MG: 100 INJECTION, SOLUTION INTRAMUSCULAR; INTRAVENOUS at 11:02

## 2022-06-18 RX ADMIN — MEROPENEM 1000 MG: 1 INJECTION, POWDER, FOR SOLUTION INTRAVENOUS at 11:55

## 2022-06-18 RX ADMIN — HYDROMORPHONE HYDROCHLORIDE 0.5 MG: 1 INJECTION, SOLUTION INTRAMUSCULAR; INTRAVENOUS; SUBCUTANEOUS at 02:13

## 2022-06-18 RX ADMIN — METHOCARBAMOL 500 MG: 100 INJECTION, SOLUTION INTRAMUSCULAR; INTRAVENOUS at 02:13

## 2022-06-18 RX ADMIN — METHOCARBAMOL 500 MG: 100 INJECTION, SOLUTION INTRAMUSCULAR; INTRAVENOUS at 19:04

## 2022-06-18 RX ADMIN — HYDROMORPHONE HYDROCHLORIDE 0.5 MG: 1 INJECTION, SOLUTION INTRAMUSCULAR; INTRAVENOUS; SUBCUTANEOUS at 23:21

## 2022-06-18 RX ADMIN — SODIUM CHLORIDE, PRESERVATIVE FREE 10 ML: 5 INJECTION INTRAVENOUS at 20:50

## 2022-06-18 RX ADMIN — MEROPENEM 1000 MG: 1 INJECTION, POWDER, FOR SOLUTION INTRAVENOUS at 20:49

## 2022-06-18 RX ADMIN — ACETAMINOPHEN 1000 MG: 500 TABLET ORAL at 00:23

## 2022-06-18 RX ADMIN — ENOXAPARIN SODIUM 30 MG: 100 INJECTION SUBCUTANEOUS at 20:50

## 2022-06-18 RX ADMIN — ACETAMINOPHEN 1000 MG: 500 TABLET ORAL at 08:21

## 2022-06-18 RX ADMIN — MEROPENEM 1000 MG: 1 INJECTION, POWDER, FOR SOLUTION INTRAVENOUS at 02:44

## 2022-06-18 ASSESSMENT — PAIN DESCRIPTION - LOCATION
LOCATION: ABDOMEN

## 2022-06-18 ASSESSMENT — PAIN SCALES - GENERAL
PAINLEVEL_OUTOF10: 4
PAINLEVEL_OUTOF10: 4
PAINLEVEL_OUTOF10: 3
PAINLEVEL_OUTOF10: 4
PAINLEVEL_OUTOF10: 5
PAINLEVEL_OUTOF10: 4

## 2022-06-18 ASSESSMENT — PAIN DESCRIPTION - PAIN TYPE
TYPE: SURGICAL PAIN
TYPE: ACUTE PAIN;SURGICAL PAIN
TYPE: ACUTE PAIN;SURGICAL PAIN

## 2022-06-18 ASSESSMENT — PAIN DESCRIPTION - FREQUENCY
FREQUENCY: CONTINUOUS

## 2022-06-18 ASSESSMENT — PAIN DESCRIPTION - ORIENTATION
ORIENTATION: MID

## 2022-06-18 ASSESSMENT — PAIN DESCRIPTION - DESCRIPTORS
DESCRIPTORS: ACHING;SORE
DESCRIPTORS: ACHING;DISCOMFORT;SORE
DESCRIPTORS: ACHING;DISCOMFORT;SORE

## 2022-06-18 ASSESSMENT — PAIN DESCRIPTION - ONSET
ONSET: ON-GOING
ONSET: GRADUAL
ONSET: GRADUAL

## 2022-06-18 NOTE — PROGRESS NOTES
[address] [place] [address] [facility]  Progress Note    SUBJECTIVE:  Chief Complaint   Patient presents with    Abdominal Pain     constipation x 1 week, bloating. Patient is tolerating medications. No reported adverse drug reactions. No nausea, vomiting, diarrhea. Review of systems:  As stated above in the chief complaint, otherwise negative. Medications:  Scheduled Meds:   fluconazole  400 mg Oral Daily    meropenem  1,000 mg IntraVENous Q8H    insulin lispro  0-6 Units SubCUTAneous 4x Daily AC & HS    pantoprazole (PROTONIX) 40 mg injection  40 mg IntraVENous Q12H    acetaminophen  1,000 mg Oral q8h    methocarbamol IVPB  500 mg IntraVENous Q8H    enoxaparin  30 mg SubCUTAneous BID     Continuous Infusions:   dextrose 5% in lactated ringers 50 mL/hr at 22 1159    dextrose       PRN Meds:glucose, dextrose bolus **OR** dextrose bolus, glucagon (rDNA), dextrose, HYDROmorphone **OR** [DISCONTINUED] HYDROmorphone, ondansetron    OBJECTIVE:  /84   Pulse 80   Temp 98.2 °F (36.8 °C) (Oral)   Resp 16   Ht 5' 7\" (1.702 m)   Wt 244 lb (110.7 kg)   SpO2 98%   BMI 38.22 kg/m²   Temp  Av °F (36.7 °C)  Min: 97.8 °F (36.6 °C)  Max: 98.2 °F (36.8 °C)  Constitutional: The patient is awake, alert, and oriented. Resting in bed  Skin: Warm and dry. No rashes were noted. HEENT: Round and reactive pupils. Moist mucous membranes. No ulcerations or thrush. Neck: Supple to movements. Chest: No use of accessory muscles to breathe. Symmetrical expansion. No wheezing, crackles or rhonchi. Cardiovascular: S1 and S2 are rhythmic and regular. No murmurs appreciated. Abdomen: Hypoactive bowel sounds to auscultation. Benign to palpation. No masses felt. No hepatosplenomegaly.   ABD on the incision line, colostomy with bowel sweat  Extremities: No cyanosis or clubbing 2+ edema  Lines: peripheral    Laboratory and Tests Review:  Lab Results   Component Value Date    WBC 12.5 (H) 06/18/2022    WBC 12.6 (H) 06/17/2022    WBC 15.0 (H) 06/16/2022    HGB 10.4 (L) 06/18/2022    HCT 33.6 (L) 06/18/2022    MCV 88.2 06/18/2022     (H) 06/18/2022     Lab Results   Component Value Date    NEUTROABS 8.28 (H) 06/18/2022    NEUTROABS 8.35 (H) 06/17/2022    NEUTROABS 10.61 (H) 06/16/2022     No results found for: [facility]  Lab Results   Component Value Date    ALT 49 (H) 06/05/2022    AST 35 06/05/2022    ALKPHOS 125 06/05/2022    BILITOT 0.7 06/05/2022     Lab Results   Component Value Date     06/18/2022    K 4.1 06/18/2022    K 4.8 06/14/2022    CL 99 06/18/2022    CO2 25 06/18/2022    BUN 8 06/18/2022    CREATININE 0.9 06/18/2022    CREATININE 0.9 06/17/2022    CREATININE 0.9 06/16/2022    GFRAA >60 06/18/2022    LABGLOM >60 06/18/2022    GLUCOSE 127 06/18/2022    PROT 7.2 06/05/2022    LABALBU 3.7 06/05/2022    CALCIUM 8.6 06/18/2022    BILITOT 0.7 06/05/2022    ALKPHOS 125 06/05/2022    AST 35 06/05/2022    ALT 49 06/05/2022     Lab Results   Component Value Date    CRP 29.6 (H) 06/08/2022     Lab Results   Component Value Date    SEDRATE 62 (H) 06/08/2022     Radiology:  Reviewed    Microbiology:   Blood Cultures 6/12/22: negative so far  MRSA nares: negative  Body Fluid Cultures 6/13/22: Growth not present, incubation continues Gram Stain: no organisms seen    ASSESSMENT:  · Diverticulitis with pelvic abscess  · Leukocytosis related to undrained abscess  · That is post exploratory lap; transverse and colostomy and appendectomy with hernia repair  · Leukocytosis-improving    PLAN:  · Continue Merrem until tomorrow night after surgical closure is complete should stop 5/18  · Continue p.o. Diflucan's-several more days  · For delayed primary wound closure at bedside today with surgery  · Check final cultures  · Monitor labs    MARBIN Fontanez - CNS  1:14 PM  6/18/2022     Patient seen and examined. I had a face to face encounter with the patient.  Agree with exam.  Assessment and plan as outlined above and directed by me. Addition and corrections were done as deemed appropriate. My exam and plan include: The patient is feeling better. Tolerating current antibiotics. Abdomen is soft and benign. Meropenem can be discontinued today. Spoke with nursing.     Pascual Marquez MD  6/18/2022  1:50 PM

## 2022-06-18 NOTE — PROGRESS NOTES
Department of General Surgery - Adult  Surgical Service      SUBJECTIVE:   naeo  Feeling good today  Actively passing gas via ostomy while in the room  No n/v  Tolerating full liquids  Vss/af    OBJECTIVE      Physical    VITALS:  /84   Pulse 80   Temp 98.2 °F (36.8 °C) (Oral)   Resp 16   Ht 5' 7\" (1.702 m)   Wt 244 lb (110.7 kg)   SpO2 98%   BMI 38.22 kg/m²     General Appearance:  awake, alert, oriented, in no acute distress  Skin:  Skin color, texture, turgor normal  Head/face:  NCAT  Eyes:  No gross abnormalities. Sclera nonicteric  Lungs/Chest:  Normal expansion. No respiratory distress. Heart: Warm throughout. Regular rate   Abdomen:  Soft, appropriately tender, midline well approximated with staples, no evidence of infection, ostomy pink with some liquid in pouch, actively passing gas while examining.   Extremities: Extremities warm to touch, pink, with no edema.     Data  CBC with Differential:    Lab Results   Component Value Date    WBC 12.5 06/18/2022    RBC 3.81 06/18/2022    HGB 10.4 06/18/2022    HCT 33.6 06/18/2022     06/18/2022    MCV 88.2 06/18/2022    MCH 27.3 06/18/2022    MCHC 31.0 06/18/2022    RDW 14.8 06/18/2022    NRBC 0.0 06/13/2022    METASPCT 1.0 06/14/2022    LYMPHOPCT 20.8 06/18/2022    MONOPCT 7.5 06/18/2022    MYELOPCT 1.0 06/14/2022    BASOPCT 0.8 06/18/2022    MONOSABS 0.93 06/18/2022    LYMPHSABS 2.60 06/18/2022    EOSABS 0.31 06/18/2022    BASOSABS 0.10 06/18/2022     BMP:    Lab Results   Component Value Date     06/18/2022    K 4.1 06/18/2022    K 4.8 06/14/2022    CL 99 06/18/2022    CO2 25 06/18/2022    BUN 8 06/18/2022    LABALBU 3.7 06/05/2022    CREATININE 0.9 06/18/2022    CALCIUM 8.6 06/18/2022    GFRAA >60 06/18/2022    LABGLOM >60 06/18/2022    GLUCOSE 127 06/18/2022       ASSESSMENT AND PLAN    50 y.o. male with complicated diverticulitis s/p exploratory laparotomy with left hemicolectomy, appendectomy with end colostomy 6/13     - antibiotics per ID  - advance to reg diet   - pain/nausea control prn  - ambulate  - path pending  - appreciate ostomy nurse     Dave Harkins MD  Minimally Invasive General Surgery and Endoscopy  [address]   [address]: [phone]  F: [phone]    Electronically signed by Terese Hood MD on 6/18/2022 at 1:33 PM

## 2022-06-19 LAB
ANION GAP SERPL CALCULATED.3IONS-SCNC: 9 MMOL/L (ref 7–16)
BASOPHILS ABSOLUTE: 0.08 E9/L (ref 0–0.2)
BASOPHILS RELATIVE PERCENT: 0.8 % (ref 0–2)
BUN BLDV-MCNC: 9 MG/DL (ref 6–20)
CALCIUM SERPL-MCNC: 8.9 MG/DL (ref 8.6–10.2)
CHLORIDE BLD-SCNC: 100 MMOL/L (ref 98–107)
CO2: 26 MMOL/L (ref 22–29)
CREAT SERPL-MCNC: 0.9 MG/DL (ref 0.7–1.2)
EOSINOPHILS ABSOLUTE: 0.4 E9/L (ref 0.05–0.5)
EOSINOPHILS RELATIVE PERCENT: 3.9 % (ref 0–6)
GFR AFRICAN AMERICAN: >60
GFR NON-AFRICAN AMERICAN: >60 ML/MIN/1.73
GLUCOSE BLD-MCNC: 123 MG/DL (ref 74–99)
HCT VFR BLD CALC: 32.6 % (ref 37–54)
HEMOGLOBIN: 10.3 G/DL (ref 12.5–16.5)
IMMATURE GRANULOCYTES #: 0.2 E9/L
IMMATURE GRANULOCYTES %: 2 % (ref 0–5)
LYMPHOCYTES ABSOLUTE: 2.98 E9/L (ref 1.5–4)
LYMPHOCYTES RELATIVE PERCENT: 29.2 % (ref 20–42)
MAGNESIUM: 2.1 MG/DL (ref 1.6–2.6)
MCH RBC QN AUTO: 27.1 PG (ref 26–35)
MCHC RBC AUTO-ENTMCNC: 31.6 % (ref 32–34.5)
MCV RBC AUTO: 85.8 FL (ref 80–99.9)
MONOCYTES ABSOLUTE: 0.81 E9/L (ref 0.1–0.95)
MONOCYTES RELATIVE PERCENT: 7.9 % (ref 2–12)
NEUTROPHILS ABSOLUTE: 5.72 E9/L (ref 1.8–7.3)
NEUTROPHILS RELATIVE PERCENT: 56.2 % (ref 43–80)
PDW BLD-RTO: 14.8 FL (ref 11.5–15)
PHOSPHORUS: 3.2 MG/DL (ref 2.5–4.5)
PLATELET # BLD: 629 E9/L (ref 130–450)
PMV BLD AUTO: 9.3 FL (ref 7–12)
POTASSIUM SERPL-SCNC: 4.3 MMOL/L (ref 3.5–5)
RBC # BLD: 3.8 E12/L (ref 3.8–5.8)
SODIUM BLD-SCNC: 135 MMOL/L (ref 132–146)
WBC # BLD: 10.2 E9/L (ref 4.5–11.5)

## 2022-06-19 PROCEDURE — 36415 COLL VENOUS BLD VENIPUNCTURE: CPT

## 2022-06-19 PROCEDURE — 6370000000 HC RX 637 (ALT 250 FOR IP): Performed by: STUDENT IN AN ORGANIZED HEALTH CARE EDUCATION/TRAINING PROGRAM

## 2022-06-19 PROCEDURE — 2580000003 HC RX 258: Performed by: STUDENT IN AN ORGANIZED HEALTH CARE EDUCATION/TRAINING PROGRAM

## 2022-06-19 PROCEDURE — 6360000002 HC RX W HCPCS: Performed by: STUDENT IN AN ORGANIZED HEALTH CARE EDUCATION/TRAINING PROGRAM

## 2022-06-19 PROCEDURE — C9113 INJ PANTOPRAZOLE SODIUM, VIA: HCPCS | Performed by: STUDENT IN AN ORGANIZED HEALTH CARE EDUCATION/TRAINING PROGRAM

## 2022-06-19 PROCEDURE — 97530 THERAPEUTIC ACTIVITIES: CPT

## 2022-06-19 PROCEDURE — 6370000000 HC RX 637 (ALT 250 FOR IP): Performed by: SURGERY

## 2022-06-19 PROCEDURE — 1200000000 HC SEMI PRIVATE

## 2022-06-19 PROCEDURE — 85025 COMPLETE CBC W/AUTO DIFF WBC: CPT

## 2022-06-19 PROCEDURE — 83735 ASSAY OF MAGNESIUM: CPT

## 2022-06-19 PROCEDURE — 6370000000 HC RX 637 (ALT 250 FOR IP)

## 2022-06-19 PROCEDURE — A4216 STERILE WATER/SALINE, 10 ML: HCPCS | Performed by: STUDENT IN AN ORGANIZED HEALTH CARE EDUCATION/TRAINING PROGRAM

## 2022-06-19 PROCEDURE — 80048 BASIC METABOLIC PNL TOTAL CA: CPT

## 2022-06-19 PROCEDURE — 84100 ASSAY OF PHOSPHORUS: CPT

## 2022-06-19 RX ORDER — OXYCODONE HYDROCHLORIDE 5 MG/1
5 TABLET ORAL EVERY 4 HOURS PRN
Status: DISCONTINUED | OUTPATIENT
Start: 2022-06-19 | End: 2022-06-20 | Stop reason: HOSPADM

## 2022-06-19 RX ORDER — PANTOPRAZOLE SODIUM 40 MG/1
40 TABLET, DELAYED RELEASE ORAL
Status: DISCONTINUED | OUTPATIENT
Start: 2022-06-19 | End: 2022-06-20 | Stop reason: HOSPADM

## 2022-06-19 RX ORDER — OXYCODONE HYDROCHLORIDE 5 MG/1
10 TABLET ORAL EVERY 4 HOURS PRN
Status: DISCONTINUED | OUTPATIENT
Start: 2022-06-19 | End: 2022-06-20 | Stop reason: HOSPADM

## 2022-06-19 RX ORDER — METHOCARBAMOL 500 MG/1
500 TABLET, FILM COATED ORAL 3 TIMES DAILY
Status: DISCONTINUED | OUTPATIENT
Start: 2022-06-19 | End: 2022-06-20 | Stop reason: HOSPADM

## 2022-06-19 RX ADMIN — ACETAMINOPHEN 1000 MG: 500 TABLET ORAL at 16:18

## 2022-06-19 RX ADMIN — FLUCONAZOLE 400 MG: 150 TABLET ORAL at 08:16

## 2022-06-19 RX ADMIN — SODIUM CHLORIDE, PRESERVATIVE FREE 40 MG: 5 INJECTION INTRAVENOUS at 06:31

## 2022-06-19 RX ADMIN — SODIUM CHLORIDE, SODIUM LACTATE, POTASSIUM CHLORIDE, CALCIUM CHLORIDE AND DEXTROSE MONOHYDRATE: 5; 600; 310; 30; 20 INJECTION, SOLUTION INTRAVENOUS at 06:31

## 2022-06-19 RX ADMIN — ACETAMINOPHEN 1000 MG: 500 TABLET ORAL at 08:14

## 2022-06-19 RX ADMIN — ACETAMINOPHEN 1000 MG: 500 TABLET ORAL at 23:58

## 2022-06-19 RX ADMIN — METHOCARBAMOL TABLETS 500 MG: 500 TABLET, COATED ORAL at 15:02

## 2022-06-19 RX ADMIN — METHOCARBAMOL 500 MG: 100 INJECTION, SOLUTION INTRAMUSCULAR; INTRAVENOUS at 03:26

## 2022-06-19 RX ADMIN — OXYCODONE 5 MG: 5 TABLET ORAL at 22:15

## 2022-06-19 RX ADMIN — ENOXAPARIN SODIUM 30 MG: 100 INJECTION SUBCUTANEOUS at 20:11

## 2022-06-19 RX ADMIN — METHOCARBAMOL TABLETS 500 MG: 500 TABLET, COATED ORAL at 20:11

## 2022-06-19 RX ADMIN — ENOXAPARIN SODIUM 30 MG: 100 INJECTION SUBCUTANEOUS at 08:15

## 2022-06-19 RX ADMIN — ACETAMINOPHEN 1000 MG: 500 TABLET ORAL at 00:46

## 2022-06-19 RX ADMIN — METHOCARBAMOL TABLETS 500 MG: 500 TABLET, COATED ORAL at 10:00

## 2022-06-19 ASSESSMENT — PAIN DESCRIPTION - LOCATION
LOCATION: ABDOMEN

## 2022-06-19 ASSESSMENT — PAIN DESCRIPTION - PAIN TYPE
TYPE: SURGICAL PAIN
TYPE: SURGICAL PAIN

## 2022-06-19 ASSESSMENT — PAIN DESCRIPTION - ONSET
ONSET: ON-GOING
ONSET: ON-GOING

## 2022-06-19 ASSESSMENT — PAIN DESCRIPTION - DESCRIPTORS
DESCRIPTORS: ACHING;SORE
DESCRIPTORS: ACHING;DISCOMFORT

## 2022-06-19 ASSESSMENT — PAIN DESCRIPTION - ORIENTATION
ORIENTATION: LEFT
ORIENTATION: MID

## 2022-06-19 ASSESSMENT — PAIN SCALES - GENERAL
PAINLEVEL_OUTOF10: 2
PAINLEVEL_OUTOF10: 2
PAINLEVEL_OUTOF10: 3
PAINLEVEL_OUTOF10: 1
PAINLEVEL_OUTOF10: 4
PAINLEVEL_OUTOF10: 3
PAINLEVEL_OUTOF10: 2

## 2022-06-19 ASSESSMENT — PAIN DESCRIPTION - FREQUENCY
FREQUENCY: CONTINUOUS
FREQUENCY: CONTINUOUS

## 2022-06-19 NOTE — PLAN OF CARE
Problem: Pain  Goal: Verbalizes/displays adequate comfort level or baseline comfort level  6/19/2022 1331 by Miles Sotelo RN  Outcome: Progressing  6/19/2022 0002 by Claudetta Shaw, RN  Outcome: Progressing     Problem: Safety - Adult  Goal: Free from fall injury  6/19/2022 1331 by Miles Sotelo RN  Outcome: Progressing  Flowsheets (Taken 6/19/2022 0003 by Claudetta Shaw, RN)  Free From Fall Injury: Instruct family/caregiver on patient safety  6/19/2022 0002 by Claudetta Shaw, RN  Outcome: Progressing     Problem: Anxiety  Goal: Will report anxiety at manageable levels  Description: INTERVENTIONS:  1. Administer medication as ordered  2. Teach and rehearse alternative coping skills  3.  Provide emotional support with 1:1 interaction with staff  6/19/2022 1331 by Miles Sotelo RN  Outcome: Progressing  6/19/2022 0002 by Claudetta Shaw, RN  Outcome: Progressing     Problem: ABCDS Injury Assessment  Goal: Absence of physical injury  6/19/2022 1331 by Miles Sotelo RN  Outcome: Progressing  Flowsheets (Taken 6/19/2022 0003 by Claudetta Shaw, RN)  Absence of Physical Injury: Implement safety measures based on patient assessment  6/19/2022 0002 by Claudetta Shaw, RN  Outcome: Progressing     Problem: Discharge Planning  Goal: Discharge to home or other facility with appropriate resources  6/19/2022 0002 by Claudetta Shaw, RN  Outcome: Progressing     Problem: Nutrition Deficit:  Goal: Optimize nutritional status  6/19/2022 1331 by Miles Sotelo RN  Outcome: Progressing  6/19/2022 0002 by Claudetta Shaw, RN  Outcome: Progressing

## 2022-06-19 NOTE — PROGRESS NOTES
Patient ambulated around hallway. Tolerated well.     Electronically signed by Abel Echevarria RN on 6/19/2022 at 10:18 AM

## 2022-06-19 NOTE — PROGRESS NOTES
Physical Therapy  Facility/Department: [address] MED SURG  Daily Treatment Note  NAME: Lillian Shahid  : 1975  MRN: [id number]    Date of Service: 2022    Patient Diagnosis(es): The primary encounter diagnosis was Diverticulitis of colon. Diagnoses of Fatty liver, Kidney cysts, Umbilical hernia without obstruction and without gangrene, and Diverticulitis were also pertinent to this visit. Patient Diagnosis(es): The primary encounter diagnosis was Diverticulitis of colon. Diagnoses of Fatty liver, Kidney cysts, Umbilical hernia without obstruction and without gangrene, and Diverticulitis were also pertinent to this visit. Past Medical History:  has a past medical history of Depression and Hypertension. Past Surgical History:  has a past surgical history that includes Tonsillectomy; Colonoscopy (N/A, 2022); and laparotomy (N/A, 2022).    Evaluating Therapist: Kristal Hernandez PT     Room #:  0297/0249-C  Diagnosis:  Diverticulitis [K57.92]  Procedure/Surgery:   . EXPLORATORY LAPAROTOMY  2. MOBILIZATION OF SPLENIC FLEXURE  3. OPEN LEFT HEMICOLECTOMY  4. TRANSVERSE END COLOSTOMY  5. APPENDECTOMY  6. PARTIAL OMENTECTOMY  7. PRIMARY REPAIR OF UMBILICAL HERNIA  Precautions:  Falls, colostomy        Social:  Pt lives alone but could go to his parents home at discharge. Both homes are 1 floor. Pt independent all areas prior to admit.     Initial Evaluation  Date: 6/15/22 Treatment   Short Term/ Long Term   Goals   Was pt agreeable to Eval/treatment? yes yes     Does pt have pain?  4/10 abdominal pain Tolerable abdominal pain     Bed Mobility  Rolling: NT  Supine to sit: NT  Sit to supine: SBA  Scooting: SBA Rolling:  NT  Supine to sit:  NT  Sit to supine:  Supervision  Scooting:  NT independent   Transfers Sit to stand: CGA  Stand to sit: CGA  Stand pivot: CGA Sit to stand:  SBA  Stand to sit:  SBA  Stand pivot:  SBA with w/w independent   Ambulation    40 feet with ww with  feet with w/w SB/supervision 200 feet with device as needed independent   Stair Negotiation  Ascended and descended  NT  NT  1 steps with 1 rail independnet   LE strength     4/5     4+/5   balance      Fair+       AM-PAC Raw score               17/24 18/24           Patient education  Pt educated on PT objectives during treatment session, hand placement during transfers    Patient response to education:   Pt verbalized understanding Pt demonstrated skill Pt requires further education in this area   yes With cueing yes     ASSESSMENT:    Comments:  Pt found sitting up in the chair and left supine in bed with call light in reach. Treatment:  Patient practiced and was instructed in the following treatment:    Functional mobility performed as documented above. No report of dizziness during functional mobility. Cueing required for hand placement during transfers. No LOB during ambulation and good gait speed. Pt reported he would be able to stay at his parents house at discharge. PLAN:    Patient is making good progress towards established goals. Will continue with current POC.      Time in  1039  Time out  1150    Total Treatment Time  11 minutes     CPT codes:    [x] Therapeutic activities 91551 11minutes  [] Therapeutic exercises 15801  minutes      Keagan Cornelius, [phone]

## 2022-06-19 NOTE — PROGRESS NOTES
[address] [place] [address] [facility]  Progress Note    SUBJECTIVE:  Chief Complaint   Patient presents with    Abdominal Pain     constipation x 1 week, bloating. Patient is tolerating medications. No reported adverse drug reactions. No nausea, vomiting, diarrhea. Seen in bed resting comfortably. Family present      Review of systems:  As stated above in the chief complaint, otherwise negative. Medications:  Scheduled Meds:   pantoprazole  40 mg Oral QAM AC    methocarbamol  500 mg Oral TID    fluconazole  400 mg Oral Daily    acetaminophen  1,000 mg Oral q8h    enoxaparin  30 mg SubCUTAneous BID     Continuous Infusions:   dextrose       PRN Meds:oxyCODONE, oxyCODONE, glucose, dextrose bolus **OR** dextrose bolus, glucagon (rDNA), dextrose, ondansetron    OBJECTIVE:  BP (!) 154/94   Pulse 69   Temp 98.6 °F (37 °C) (Oral)   Resp 18   Ht 5' 7\" (1.702 m)   Wt 244 lb (110.7 kg)   SpO2 97%   BMI 38.22 kg/m²   Temp  Av.8 °F (37.1 °C)  Min: 98.6 °F (37 °C)  Max: 99 °F (37.2 °C)  Constitutional: The patient is awake, alert, and oriented. Up in chair. Talkative feels well. Skin: Warm and dry. No rashes were noted. HEENT: Round and reactive pupils. Moist mucous membranes. No ulcerations or thrush. Neck: Supple to movements. Chest: No use of accessory muscles to breathe. Symmetrical expansion. No wheezing, crackles or rhonchi. Cardiovascular: S1 and S2 are rhythmic and regular. No murmurs appreciated. Abdomen: Hypoactive bowel sounds to auscultation. Benign to palpation. No masses felt. No hepatosplenomegaly.   ABD on the incision line, colostomy with bowel   Extremities: No cyanosis or clubbing 2+ edema  Lines: peripheral    Laboratory and Tests Review:  Lab Results   Component Value Date    WBC 10.2 2022    WBC 12.5 (H) 2022    WBC 12.6 (H) 2022    HGB 10.3 (L) 2022    HCT 32.6 (L) 2022    MCV 85.8 2022     (H) 2022 Lab Results   Component Value Date    NEUTROABS 5.72 06/19/2022    NEUTROABS 8.28 (H) 06/18/2022    NEUTROABS 8.35 (H) 06/17/2022     No results found for: [facility]  Lab Results   Component Value Date    ALT 49 (H) 06/05/2022    AST 35 06/05/2022    ALKPHOS 125 06/05/2022    BILITOT 0.7 06/05/2022     Lab Results   Component Value Date     06/19/2022    K 4.3 06/19/2022    K 4.8 06/14/2022     06/19/2022    CO2 26 06/19/2022    BUN 9 06/19/2022    CREATININE 0.9 06/19/2022    CREATININE 0.9 06/18/2022    CREATININE 0.9 06/17/2022    GFRAA >60 06/19/2022    LABGLOM >60 06/19/2022    GLUCOSE 123 06/19/2022    PROT 7.2 06/05/2022    LABALBU 3.7 06/05/2022    CALCIUM 8.9 06/19/2022    BILITOT 0.7 06/05/2022    ALKPHOS 125 06/05/2022    AST 35 06/05/2022    ALT 49 06/05/2022     Lab Results   Component Value Date    CRP 29.6 (H) 06/08/2022     Lab Results   Component Value Date    SEDRATE 62 (H) 06/08/2022     Radiology:  Reviewed    Microbiology:   Blood Cultures 6/12/22: negative so far  MRSA nares: negative  Body Fluid Cultures 6/13/22: Growth not present, incubation continues Gram Stain: no organisms seen    ASSESSMENT:  · Diverticulitis with pelvic abscess  · Leukocytosis related to undrained abscess  · That is post exploratory lap; transverse and colostomy and appendectomy with hernia repair  · Leukocytosis-improving    PLAN:  ·  Merrem  stop 5/18 after delayed closure. · Continue p.o. Diflucan's-several more days  · Check final cultures  · Monitor labs    MARBIN Obrien  10:19 AM  6/19/2022     Patient seen and examined. I had a face to face encounter with the patient. Agree with exam.  Assessment and plan as outlined above and directed by me. Addition and corrections were done as deemed appropriate. My exam and plan include: Patient is doing well. He is currently off antibiotics. He is still on oral antifungal.  Continue current treatment. Spoke with nursing.     Gosia Mederos, MD  6/19/2022  1:04 PM

## 2022-06-19 NOTE — PROGRESS NOTES
Department of General Surgery - Adult  Surgical Service      SUBJECTIVE:   naeo  +flatus into ostomy bag but no stool yet  No n/v  Tolerating reg diet w/o issue; just had first regular diet tray this morning  Vss/af    OBJECTIVE      Physical    VITALS:  BP (!) 154/94   Pulse 69   Temp 98.6 °F (37 °C) (Oral)   Resp 18   Ht 5' 7\" (1.702 m)   Wt 244 lb (110.7 kg)   SpO2 97%   BMI 38.22 kg/m²     General Appearance:  awake, alert, oriented, in no acute distress  Skin:  Skin color, texture, turgor normal  Head/face:  NCAT  Eyes:  No gross abnormalities. Sclera nonicteric  Lungs/Chest:  Normal expansion. No respiratory distress. Heart: Warm throughout. Regular rate   Abdomen:  Soft, appropriately tender, midline well approximated with staples, no evidence of infection, ostomy pink with some liquid in pouch, actively passing gas while examining.   Extremities: Extremities warm to touch, pink, with no edema.     Data  CBC with Differential:    Lab Results   Component Value Date    WBC 10.2 06/19/2022    RBC 3.80 06/19/2022    HGB 10.3 06/19/2022    HCT 32.6 06/19/2022     06/19/2022    MCV 85.8 06/19/2022    MCH 27.1 06/19/2022    MCHC 31.6 06/19/2022    RDW 14.8 06/19/2022    NRBC 0.0 06/13/2022    METASPCT 1.0 06/14/2022    LYMPHOPCT 29.2 06/19/2022    MONOPCT 7.9 06/19/2022    MYELOPCT 1.0 06/14/2022    BASOPCT 0.8 06/19/2022    MONOSABS 0.81 06/19/2022    LYMPHSABS 2.98 06/19/2022    EOSABS 0.40 06/19/2022    BASOSABS 0.08 06/19/2022     BMP:    Lab Results   Component Value Date     06/19/2022    K 4.3 06/19/2022    K 4.8 06/14/2022     06/19/2022    CO2 26 06/19/2022    BUN 9 06/19/2022    LABALBU 3.7 06/05/2022    CREATININE 0.9 06/19/2022    CALCIUM 8.9 06/19/2022    GFRAA >60 06/19/2022    LABGLOM >60 06/19/2022    GLUCOSE 123 06/19/2022       ASSESSMENT AND PLAN    50 y.o. male with complicated diverticulitis s/p exploratory laparotomy with left hemicolectomy, appendectomy with end colostomy 6/13     - antibiotics per ID  - continue reg diet  - dc IV pain meds; transition to PO  - dc IVF  - pain/nausea control prn  - ambulate  - path pending  - appreciate ostomy nurse   - PT/OT; he lives alone and unsure if he will need rehab at discharge or not    Erin Marquez MD  Minimally Invasive General Surgery and Endoscopy  [address] [facility].  [address]: [phone]  F: [phone]    Electronically signed by Rachael Bernheim, MD on 6/19/2022 at 8:25 AM

## 2022-06-19 NOTE — DISCHARGE INSTRUCTIONS
Home Care    Keep the incision area clean and dry    Okay to take a shower starting tomorrow.  Place ice on incisions to decrease the pain and bruising. Physical Activity    Walk frequently to prevent blood clots in the legs, but do not do anything that causes pain in the incisions.  Breath deeply every hour to prevent pneumonia.  No heavy lifting over 10lbs for 4-6wks. Work    Okay to return to work with light duty next week when your pain is controlled    Avoid Heavy lifting while at work   [employer] Await follow-up appointment in 2 weeks for full work clearance   Medications    Restart blood thinners in 24 hours if no sign of bleeding    Take Ibuprofen for moderate pain. Use the Oxycodone for more severe pain.  No driving while taking prescription pain medication   Follow-up    Schedule a follow-up appointment with Dr. Linn Villaseñor in 2 weeks. Call Your Doctor If Any of the Following Occurs    Signs of infection, including fever and chills    Redness, swelling, increasing pain, excessive bleeding, or discharge at the incision site    Cough, shortness of breath, chest pain    Increased abdominal pain    Nausea and/or vomiting that does not resolve off narcotics.  Pain, burning, urgency or frequency of urination, or blood in the urine    Pain and/or swelling in your feet, calves, or legs    Dark urine, light stools, or evidence of jaundice (yellowing of the skin or eyes). In case of an emergency, CALL 911 immediately. Camila Kim MD  Minimally Invasive General Surgery and Endoscopy  [address] [place] [address].  [address]  [address] [address]: [phone]  F: [phone]

## 2022-06-20 VITALS
TEMPERATURE: 97.9 F | RESPIRATION RATE: 16 BRPM | HEART RATE: 67 BPM | HEIGHT: 67 IN | BODY MASS INDEX: 36.1 KG/M2 | SYSTOLIC BLOOD PRESSURE: 166 MMHG | DIASTOLIC BLOOD PRESSURE: 96 MMHG | WEIGHT: 230 LBS | OXYGEN SATURATION: 99 %

## 2022-06-20 LAB
ANION GAP SERPL CALCULATED.3IONS-SCNC: 13 MMOL/L (ref 7–16)
BASOPHILS ABSOLUTE: 0.11 E9/L (ref 0–0.2)
BASOPHILS RELATIVE PERCENT: 1 % (ref 0–2)
BUN BLDV-MCNC: 12 MG/DL (ref 6–20)
CALCIUM SERPL-MCNC: 9.5 MG/DL (ref 8.6–10.2)
CHLORIDE BLD-SCNC: 102 MMOL/L (ref 98–107)
CO2: 23 MMOL/L (ref 22–29)
CREAT SERPL-MCNC: 1 MG/DL (ref 0.7–1.2)
EOSINOPHILS ABSOLUTE: 0.47 E9/L (ref 0.05–0.5)
EOSINOPHILS RELATIVE PERCENT: 4.2 % (ref 0–6)
GFR AFRICAN AMERICAN: >60
GFR NON-AFRICAN AMERICAN: >60 ML/MIN/1.73
GLUCOSE BLD-MCNC: 107 MG/DL (ref 74–99)
HCT VFR BLD CALC: 35 % (ref 37–54)
HEMOGLOBIN: 11.4 G/DL (ref 12.5–16.5)
IMMATURE GRANULOCYTES #: 0.18 E9/L
IMMATURE GRANULOCYTES %: 1.6 % (ref 0–5)
LYMPHOCYTES ABSOLUTE: 3.27 E9/L (ref 1.5–4)
LYMPHOCYTES RELATIVE PERCENT: 29.5 % (ref 20–42)
MAGNESIUM: 2.3 MG/DL (ref 1.6–2.6)
MCH RBC QN AUTO: 27.6 PG (ref 26–35)
MCHC RBC AUTO-ENTMCNC: 32.6 % (ref 32–34.5)
MCV RBC AUTO: 84.7 FL (ref 80–99.9)
MONOCYTES ABSOLUTE: 0.85 E9/L (ref 0.1–0.95)
MONOCYTES RELATIVE PERCENT: 7.7 % (ref 2–12)
NEUTROPHILS ABSOLUTE: 6.21 E9/L (ref 1.8–7.3)
NEUTROPHILS RELATIVE PERCENT: 56 % (ref 43–80)
PDW BLD-RTO: 15 FL (ref 11.5–15)
PHOSPHORUS: 3.8 MG/DL (ref 2.5–4.5)
PLATELET # BLD: 768 E9/L (ref 130–450)
PMV BLD AUTO: 9.8 FL (ref 7–12)
POTASSIUM SERPL-SCNC: 4.3 MMOL/L (ref 3.5–5)
RBC # BLD: 4.13 E12/L (ref 3.8–5.8)
SODIUM BLD-SCNC: 138 MMOL/L (ref 132–146)
WBC # BLD: 11.1 E9/L (ref 4.5–11.5)

## 2022-06-20 PROCEDURE — 36415 COLL VENOUS BLD VENIPUNCTURE: CPT

## 2022-06-20 PROCEDURE — 85025 COMPLETE CBC W/AUTO DIFF WBC: CPT

## 2022-06-20 PROCEDURE — 6370000000 HC RX 637 (ALT 250 FOR IP)

## 2022-06-20 PROCEDURE — 6360000002 HC RX W HCPCS: Performed by: STUDENT IN AN ORGANIZED HEALTH CARE EDUCATION/TRAINING PROGRAM

## 2022-06-20 PROCEDURE — 6370000000 HC RX 637 (ALT 250 FOR IP): Performed by: SURGERY

## 2022-06-20 PROCEDURE — 6370000000 HC RX 637 (ALT 250 FOR IP): Performed by: STUDENT IN AN ORGANIZED HEALTH CARE EDUCATION/TRAINING PROGRAM

## 2022-06-20 PROCEDURE — 80048 BASIC METABOLIC PNL TOTAL CA: CPT

## 2022-06-20 PROCEDURE — 84100 ASSAY OF PHOSPHORUS: CPT

## 2022-06-20 PROCEDURE — 83735 ASSAY OF MAGNESIUM: CPT

## 2022-06-20 RX ORDER — OXYCODONE HYDROCHLORIDE 5 MG/1
5 TABLET ORAL EVERY 6 HOURS PRN
Qty: 20 TABLET | Refills: 0 | Status: SHIPPED | OUTPATIENT
Start: 2022-06-20 | End: 2022-06-25

## 2022-06-20 RX ORDER — METHOCARBAMOL 500 MG/1
500 TABLET, FILM COATED ORAL 3 TIMES DAILY
Qty: 30 TABLET | Refills: 0 | Status: SHIPPED | OUTPATIENT
Start: 2022-06-20 | End: 2022-06-30

## 2022-06-20 RX ORDER — FLUCONAZOLE 200 MG/1
400 TABLET ORAL DAILY
Qty: 14 TABLET | Refills: 0 | Status: SHIPPED | OUTPATIENT
Start: 2022-06-20 | End: 2022-06-27

## 2022-06-20 RX ORDER — SENNA PLUS 8.6 MG/1
1 TABLET ORAL 2 TIMES DAILY
Qty: 20 TABLET | Refills: 0 | Status: SHIPPED | OUTPATIENT
Start: 2022-06-20 | End: 2022-06-30

## 2022-06-20 RX ORDER — LINEZOLID 600 MG/1
600 TABLET, FILM COATED ORAL 2 TIMES DAILY
Qty: 14 TABLET | Refills: 0 | Status: SHIPPED | OUTPATIENT
Start: 2022-06-20 | End: 2022-06-27

## 2022-06-20 RX ORDER — PANTOPRAZOLE SODIUM 40 MG/1
40 TABLET, DELAYED RELEASE ORAL
Qty: 30 TABLET | Refills: 3 | Status: SHIPPED | OUTPATIENT
Start: 2022-06-21

## 2022-06-20 RX ADMIN — METHOCARBAMOL TABLETS 500 MG: 500 TABLET, COATED ORAL at 15:14

## 2022-06-20 RX ADMIN — PANTOPRAZOLE SODIUM 40 MG: 40 TABLET, DELAYED RELEASE ORAL at 05:27

## 2022-06-20 RX ADMIN — METHOCARBAMOL TABLETS 500 MG: 500 TABLET, COATED ORAL at 08:59

## 2022-06-20 RX ADMIN — ENOXAPARIN SODIUM 30 MG: 100 INJECTION SUBCUTANEOUS at 09:00

## 2022-06-20 RX ADMIN — FLUCONAZOLE 400 MG: 150 TABLET ORAL at 12:54

## 2022-06-20 RX ADMIN — ACETAMINOPHEN 1000 MG: 500 TABLET ORAL at 09:00

## 2022-06-20 ASSESSMENT — PAIN DESCRIPTION - DESCRIPTORS
DESCRIPTORS: ACHING
DESCRIPTORS: DISCOMFORT

## 2022-06-20 ASSESSMENT — PAIN SCALES - GENERAL
PAINLEVEL_OUTOF10: 2
PAINLEVEL_OUTOF10: 2

## 2022-06-20 ASSESSMENT — PAIN DESCRIPTION - LOCATION
LOCATION: ABDOMEN
LOCATION: ABDOMEN

## 2022-06-20 NOTE — PROGRESS NOTES
CLINICAL PHARMACY NOTE: MEDS TO BEDS    Total # of Prescriptions Filled: 1   The following medications were delivered to the patient:  · Fluconazole 200 mg       Additional Documentation:

## 2022-06-20 NOTE — PATIENT CARE CONFERENCE
P Quality Flow/Interdisciplinary Rounds Progress Note        Quality Flow Rounds held on June 20, 2022    Disciplines Attending:  Bedside Nurse, ,  and Nursing Unit Leadership    Cassi Brooke was admitted on 6/5/2022 11:55 AM    Anticipated Discharge Date:  Expected Discharge Date: 06/16/22    Disposition:    Boubacar Score:  Boubacar Scale Score: 20    Readmission Risk              Risk of Unplanned Readmission:  13           Discussed patient goal for the day, patient clinical progression, and barriers to discharge.   The following Goal(s) of the Day/Commitment(s) have been identified:  Diagnostics - Report Results      Eh Kam RN  June 20, 2022

## 2022-06-20 NOTE — PROGRESS NOTES
[address] [place] [address] [facility]  Progress Note    SUBJECTIVE:  Chief Complaint   Patient presents with    Abdominal Pain     constipation x 1 week, bloating. Patient is tolerating medications. No reported adverse drug reactions. No nausea, vomiting, diarrhea. Sitting up in bed, eating lunch  Feeling well, tolerating diet and antibiotics  No fevers    Review of systems:  As stated above in the chief complaint, otherwise negative. Medications:  Scheduled Meds:   pantoprazole  40 mg Oral QAM AC    methocarbamol  500 mg Oral TID    fluconazole  400 mg Oral Daily    acetaminophen  1,000 mg Oral q8h    enoxaparin  30 mg SubCUTAneous BID     Continuous Infusions:   dextrose       PRN Meds:oxyCODONE, oxyCODONE, glucose, dextrose bolus **OR** dextrose bolus, glucagon (rDNA), dextrose, ondansetron    OBJECTIVE:  BP (!) 166/96   Pulse 67   Temp 97.9 °F (36.6 °C) (Oral)   Resp 16   Ht 5' 7\" (1.702 m)   Wt 230 lb (104.3 kg)   SpO2 99%   BMI 36.02 kg/m²   Temp  Av.3 °F (36.8 °C)  Min: 97.9 °F (36.6 °C)  Max: 98.6 °F (37 °C)  Constitutional: The patient is awake, alert, and oriented. Up in bed. Talkative feels well. Skin: Warm and dry. No rashes were noted. HEENT: Round and reactive pupils. Moist mucous membranes. No ulcerations or thrush. Neck: Supple to movements. Chest: No use of accessory muscles to breathe. Symmetrical expansion. No wheezing, crackles or rhonchi. Cardiovascular: S1 and S2 are rhythmic and regular. No murmurs appreciated. Abdomen: Hypoactive bowel sounds to auscultation. Benign to palpation. No masses felt. No hepatosplenomegaly.   ABD on the incision line and dressing, colostomy with liquid  Extremities: No cyanosis or clubbing 2+ edema  Lines: peripheral    Laboratory and Tests Review:  Lab Results   Component Value Date    WBC 11.1 2022    WBC 10.2 2022    WBC 12.5 (H) 2022    HGB 11.4 (L) 2022    HCT 35.0 (L) 2022 MCV 84.7 06/20/2022     (H) 06/20/2022     Lab Results   Component Value Date    NEUTROABS 6.21 06/20/2022    NEUTROABS 5.72 06/19/2022    NEUTROABS 8.28 (H) 06/18/2022     No results found for: [facility]  Lab Results   Component Value Date    ALT 49 (H) 06/05/2022    AST 35 06/05/2022    ALKPHOS 125 06/05/2022    BILITOT 0.7 06/05/2022     Lab Results   Component Value Date     06/20/2022    K 4.3 06/20/2022    K 4.8 06/14/2022     06/20/2022    CO2 23 06/20/2022    BUN 12 06/20/2022    CREATININE 1.0 06/20/2022    CREATININE 0.9 06/19/2022    CREATININE 0.9 06/18/2022    GFRAA >60 06/20/2022    LABGLOM >60 06/20/2022    GLUCOSE 107 06/20/2022    PROT 7.2 06/05/2022    LABALBU 3.7 06/05/2022    CALCIUM 9.5 06/20/2022    BILITOT 0.7 06/05/2022    ALKPHOS 125 06/05/2022    AST 35 06/05/2022    ALT 49 06/05/2022     Lab Results   Component Value Date    CRP 29.6 (H) 06/08/2022     Lab Results   Component Value Date    SEDRATE 62 (H) 06/08/2022     Radiology:  Reviewed    Microbiology:   Blood Cultures 6/12/22: negative so far  MRSA nares: negative  Body Fluid Cultures 6/13/22: Growth not present, incubation continues Gram Stain: no organisms seen    ASSESSMENT:  Diverticulitis with pelvic abscess  Leukocytosis related to undrained abscess  That is post exploratory lap; transverse and colostomy and appendectomy with hernia repair  Leukocytosis-improving    PLAN:  Continue p.o. Diflucan- 7 more days  Add Zyvox p.o. for 7 days for redness around incision site  Check final cultures  Monitor labs  Okay to DC - scripts on chart    MARBIN Bennett CNP  11:43 AM  6/20/2022   Pt seen and examined. Above discussed agree with advanced practice nurse. Labs, cultures, and radiographs reviewed. Face to Face encounter occurred. Changes made as necessary.      Hortensia Miller MD

## 2022-06-20 NOTE — PROGRESS NOTES
Occupational Therapy  OT BEDSIDE TREATMENT NOTE      Date:2022  Patient Name: Teresita Goltz  MRN: [id number]  : 1975  Room: [address] [place], [place] [address]     Evaluating OT: Kevin Thornton OTR/NICK [id number]       Referring Provider: Lisa Flores DO    Specific Provider Orders/Date: OT eval and treat 6/15/22       Diagnosis:  Diverticulitis [K57.92]    Surgery: 22: EXPLORATORY LAPAROTOMY  2. MOBILIZATION OF SPLENIC FLEXURE  3. OPEN LEFT HEMICOLECTOMY  4. TRANSVERSE END COLOSTOMY  5. APPENDECTOMY  6. PARTIAL OMENTECTOMY  7. PRIMARY REPAIR OF UMBILICAL HERNIA     Pertinent Medical History: HTN       Precautions:  Fall Risk, colostomy      Assessment of current deficits    [x]? Functional mobility            [x]?ADLs           [x]? Strength                  []?Cognition    [x]? Functional transfers          [x]? IADLs         [x]? Safety Awareness   [x]? Endurance    []? Fine Coordination                         [x]? Balance      []? Vision/perception   []? Sensation      []? Gross Motor Coordination             []? ROM           []?  Delirium                   []? Motor Control      OT PLAN OF CARE   OT POC based on physician orders, patient diagnosis and results of clinical assessment     Frequency/Duration 2-5 days/wk for 2 weeks PRN   Specific OT Treatment Interventions to include:   * Instruction/training on adapted ADL techniques and AE recommendations to increase functional independence within precautions       * Training on energy conservation strategies, correct breathing pattern and techniques to improve independence/tolerance for self-care routine  * Functional transfer/mobility training/DME recommendations for increased independence, safety, and fall prevention  * Patient/Family education to increase follow through with safety techniques and functional independence  * Recommendation of environmental modifications for increased safety with functional transfers/mobility and ADLs  * Therapeutic exercise to improve motor endurance, ROM, and functional strength for ADLs/functional transfers  * Therapeutic activities to facilitate/challenge dynamic balance, stand tolerance for increased safety and independence with ADLs        Recommended Adaptive Equipment: TBD      Home Living: Pt lives alone in 1 story house. Pt has walk in shower with shower seat and hand held shower head. Pt reports he may d/c to parent's house, which is 1 story and has a walk in shower with shower chair and grab bars. Pt does not use a device for functional mobility.     Prior Level of Function: independent with ADLs , independent with IADLs;  Driving: yes  Occupation: assistant law director      Pain Level: pt did not complain of pain  Cognition: A&O: 4/4; WFL command follow demonstrated. Pt pleasant during session.               Memory:  Good               Sequencing:  Good               Problem solving:  Good               Judgement/safety:  Good                 Functional Assessment:  AM-PAC Daily Activity Raw Score: 19/24    Initial Eval Status  Date: 6/15/22 Treatment Status  Date: 6/20/22 STGs = LTGs  Time frame: 10-14 days   Feeding Independent   independent     Grooming CGA supervision  Mod I/I   UB Dressing Min A supervision   Mod I/I   LB Dressing Mod A supervision    Mod I/I-with use of AD as appropriate/needed   Bathing Mod A   Mod I/I -with use of AD as appropriate/needed   Toileting Min A supervision   Mod I/I    Bed Mobility   Sit to supine: SBA   supervision  Independent    Functional Transfers CGA with wheeled walker  Sit to stand from chair  Stand to sit to EOB  Cues for hand placement and technique  Sit<> stand supervision   Independent     Functional Mobility CGA with wheeled walker  Short distance in room  Cues for safe wheeled walker management  Assist to manage lines supervision  in room and willett Independent  -with device as needed to maximize independence with ADLs and functional task completion   Balance Sitting:     Static: independent     Dynamic: SBA  Standing: CGA with wheeled walker supervision   I for dynamic sitting balance to maximize independence with ADLs and functional task completion     I for standing balance to maximize independence with ADLs and functional task completion   Activity Tolerance Fair with light activity. Pt limited by pain. good  Good with ADL activity. Pt will demonstrate good understanding of education provided on EC/WS techniques        Comments: Patient cleared with nursing and agreeable to therapy. Much improvement and good tolerance demonstrated. Patient declined need for AE. Patient in chair with call light in reach at the end of the session. Education/treatment: ADL and functional transfer/activity performed to increase safety and independence during self care tasks. Education provided on safety awareness, adl reeducation, functional transfer training    · Pt has made progress towards set goals.      Time In: 11:20  Time Out: 11:43     Min Units   Therapeutic Ex [id number]     Therapeutic Activities [id number]   ADL/Self Care [id number]     Orthotic Management 20226     Neuro Re-Ed [id number]     Non-Billable Time     TOTAL TIMED TREATMENT [id number] Zayra HUFFMAN/NICK [id number]

## 2022-06-20 NOTE — PLAN OF CARE
Problem: Pain  Goal: Verbalizes/displays adequate comfort level or baseline comfort level  Outcome: Progressing     Problem: Safety - Adult  Goal: Free from fall injury  Outcome: Progressing     Problem: Anxiety  Goal: Will report anxiety at manageable levels  Description: INTERVENTIONS:  1. Administer medication as ordered  2. Teach and rehearse alternative coping skills  3.  Provide emotional support with 1:1 interaction with staff  Outcome: Progressing     Problem: ABCDS Injury Assessment  Goal: Absence of physical injury  Outcome: Progressing

## 2022-06-20 NOTE — PROGRESS NOTES
Comprehensive Nutrition Assessment    Type and Reason for Visit:  Reassess    Nutrition Recommendations/Plan:   1. Continue current diet  2. Continue ensure HP BID to meet pt's pro needs  3. Discontinue gelatein BID per pt report \"doesn't eat it nor likes it that much\". 4. Add javier BID to promote wound healing     Malnutrition Assessment:  Malnutrition Status: At risk for malnutrition (Comment) (06/20/22 1226)    Context:  Acute Illness     Findings of the 6 clinical characteristics of malnutrition:  Energy Intake:  Mild decrease in energy intake (Comment)  Weight Loss:  Unable to assess (d/t +I/Os)     Body Fat Loss:  No significant body fat loss     Muscle Mass Loss:  No significant muscle mass loss    Fluid Accumulation:  No significant fluid accumulation     Strength:  Not Performed    Nutrition Assessment:    Pt now on regular diet, consuming >75% of meals, s/p delayed primary closure completed 6/17. Pt s/p NG/LUC drain removal 6/15 & ex lap w/ L hemicolectomy, appendectomy, & colostomy d/t complicated diverticulitis 6/13. Plan to continue current diet d/t pt's tolerance & will modify current ONS regimen. Nutrition Related Findings:    A/Ox4, rounded/distended abd, hypo BS, LUQ colostomy + bloody output/gas, +I/Os 15.7L Wound Type: Multiple,Surgical Incision (2x - abd)       Current Nutrition Intake & Therapies:    Average Meal Intake: % (PO intake improved)  Average Supplements Intake: % (6/20 per pt report)  ADULT ORAL NUTRITION SUPPLEMENT; Lunch, Dinner; Other Oral Supplement; Gelatein 20  ADULT ORAL NUTRITION SUPPLEMENT; Breakfast, Dinner; Low Calorie/High Protein Oral Supplement  ADULT DIET; Regular    Anthropometric Measures:  Height: 5' 7\" (170.2 cm)  Ideal Body Weight (IBW): 148 lbs (67 kg)    Admission Body Weight: 243 lb (110.2 kg)  Current Body Weight: 230 lb (104.3 kg) (6/20), 165.5 % IBW.  Weight Source: Bed Scale  Current BMI (kg/m2): 36  Usual Body Weight: 235 lb (106.6 kg) (per patient report)  % Weight Change (Calculated): 11.9  Weight Adjustment For: No Adjustment                 BMI Categories: Obese Class 2 (BMI 35.0 -39.9)    Estimated Daily Nutrient Needs:  Energy Requirements Based On: Kcal/kg  Weight Used for Energy Requirements: Current  Energy (kcal/day): 2000-2100  Weight Used for Protein Requirements: Ideal  Protein (g/day):  (1.3-1.5 g/kg)  Method Used for Fluid Requirements: 1 ml/kcal  Fluid (ml/day): 0784-0526    Nutrition Diagnosis:   · Increased nutrient needs related to increase demand for energy/nutrients as evidenced by wounds    Nutrition Interventions:   Food and/or Nutrient Delivery: Continue Current Diet,Modify Oral Nutrition Supplement (discontinue gelatein per pt request/not consuming it, plan to continue ensure HP BID, & add javier BID)  Nutrition Education/Counseling: Education completed (colostomy)  Coordination of Nutrition Care: Continue to monitor while inpatient       Goals:  Previous Goal Met: Progressing toward Goal(s) (diet was advanced)  Goals: PO intake 75% or greater  Specify Other Goals: nutrition progression    Nutrition Monitoring and Evaluation:   Behavioral-Environmental Outcomes: None Identified  Food/Nutrient Intake Outcomes: Food and Nutrient Intake,Supplement Intake  Physical Signs/Symptoms Outcomes: Biochemical Data,Nutrition Focused Physical Findings,Skin,Weight,GI Status,Fluid Status or Edema    Discharge Planning:     Too soon to determine     [facility]  Contact: [phone]

## 2022-06-20 NOTE — PROGRESS NOTES
Department of General Surgery - Adult  Surgical Service      SUBJECTIVE:   +flatus into ostomy bag but minimal stool  No n/v  Tolerating reg diet w/o issue    OBJECTIVE      Physical    VITALS:  BP (!) 164/94   Pulse 66   Temp 98.6 °F (37 °C) (Oral)   Resp 16   Ht 5' 7\" (1.702 m)   Wt 230 lb (104.3 kg)   SpO2 98%   BMI 36.02 kg/m²     General Appearance:  awake, alert, oriented, in no acute distress  Skin:  Skin color, texture, turgor normal  Head/face:  NCAT  Eyes:  No gross abnormalities. Sclera nonicteric  Lungs/Chest:  Normal expansion. No respiratory distress. Heart: Warm throughout. Regular rate   Abdomen:  Soft, appropriately tender, midline well approximated with staples, no evidence of infection, ostomy pink with some liquid in pouch, actively passing gas while examining.   Extremities: Extremities warm to touch, pink, with no edema.     Data  CBC with Differential:    Lab Results   Component Value Date    WBC 11.1 06/20/2022    RBC 4.13 06/20/2022    HGB 11.4 06/20/2022    HCT 35.0 06/20/2022     06/20/2022    MCV 84.7 06/20/2022    MCH 27.6 06/20/2022    MCHC 32.6 06/20/2022    RDW 15.0 06/20/2022    NRBC 0.0 06/13/2022    METASPCT 1.0 06/14/2022    LYMPHOPCT 29.5 06/20/2022    MONOPCT 7.7 06/20/2022    MYELOPCT 1.0 06/14/2022    BASOPCT 1.0 06/20/2022    MONOSABS 0.85 06/20/2022    LYMPHSABS 3.27 06/20/2022    EOSABS 0.47 06/20/2022    BASOSABS 0.11 06/20/2022     BMP:    Lab Results   Component Value Date     06/20/2022    K 4.3 06/20/2022    K 4.8 06/14/2022     06/20/2022    CO2 23 06/20/2022    BUN 12 06/20/2022    LABALBU 3.7 06/05/2022    CREATININE 1.0 06/20/2022    CALCIUM 9.5 06/20/2022    GFRAA >60 06/20/2022    LABGLOM >60 06/20/2022    GLUCOSE 107 06/20/2022       ASSESSMENT AND PLAN    50 y.o. male with complicated diverticulitis s/p exploratory laparotomy with left hemicolectomy, appendectomy with end colostomy 6/13     - antibiotics per ID - ask for diflucan script   - continue reg diet  - pain/nausea control prn  - ambulate  - appreciate ostomy nurse   - discharge home today    Electronically signed by Neri Casillas MD on 6/20/2022 at 7:43 AM     Pt seen and examined; agree w above  Doing well  Tolerating diet  No n/v  +flatus into bag  Denies abd pain  Vss/af    Ok for discharge today  F/u in my office 2 weeks for staple removal  abx per ID    Andrea Gutierrez MD  Minimally Invasive General Surgery and Endoscopy  [address]   [address]: [phone]  F: [phone]    Electronically signed by Rajesh Estrella MD on 6/20/2022 at 8:01 AM

## 2022-06-20 NOTE — PLAN OF CARE
Problem: Pain  Goal: Verbalizes/displays adequate comfort level or baseline comfort level  6/19/2022 2057 by Mckayla Solis  Outcome: Progressing  6/19/2022 1331 by Diana Alfaro RN  Outcome: Progressing     Problem: Safety - Adult  Goal: Free from fall injury  6/19/2022 2057 by Mckayla Solis  Outcome: Progressing  6/19/2022 1331 by Diana Alfaro RN  Outcome: Progressing  Flowsheets (Taken 6/19/2022 0003 by Dae Tellez RN)  Free From Fall Injury: Instruct family/caregiver on patient safety     Problem: Anxiety  Goal: Will report anxiety at manageable levels  Description: INTERVENTIONS:  1. Administer medication as ordered  2. Teach and rehearse alternative coping skills  3.  Provide emotional support with 1:1 interaction with staff  6/19/2022 2057 by Mckayla Solis  Outcome: Progressing  6/19/2022 1331 by Diana Alfaro RN  Outcome: Progressing     Problem: ABCDS Injury Assessment  Goal: Absence of physical injury  6/19/2022 2057 by Mckayla Solis  Outcome: Progressing  6/19/2022 1331 by Diana Alfaro RN  Outcome: Progressing  Flowsheets (Taken 6/19/2022 0003 by Dae Tellez RN)  Absence of Physical Injury: Implement safety measures based on patient assessment     Problem: Discharge Planning  Goal: Discharge to home or other facility with appropriate resources  Outcome: Progressing     Problem: Nutrition Deficit:  Goal: Optimize nutritional status  6/19/2022 2057 by Mckayla Solis  Outcome: Progressing  6/19/2022 1331 by Diana Alfaro RN  Outcome: Progressing no

## 2022-06-20 NOTE — FLOWSHEET NOTE
Inpatient Wound Care    Admit Date: 6/5/2022 11:55 AM    Reason for consult:  Colostomy    Significant history:  Admitted with Diverticulitis s/p Rogers's    Wound history:  New colostomy    Findings:       06/20/22 1517   Unmeasured Output   Urine Occurrence 4   Colostomy LUQ   Placement Date/Time: 06/13/22 1555   Location: LUQ   Stomal Appliance 2 piece   Flange Size (inches)   (stoma 45 mm)   Stoma  Assessment Red;Protrudes   Peristomal Assessment Intact   Treatment Bag change   Stool Appearance Soft   Stool Color Brown   Stool Amount Small       Impression:  Stoma measures 45 mm , slightly raised. Functioning for small amt of brown , soft stool. Interventions in place:  Complete lesson done with the Pt using Coloplast red , 2 pc and a ring. The Pt did the entire pouch change with a few verbal cues. Reviewed the information folder. Plan: For discharge with home care.      Benjamin Roa RN 6/20/2022 3:18 PM

## 2022-06-20 NOTE — CARE COORDINATION
Social Work discharge planning   Sw spoke to pt, who said he does NOT need a ww. Pt needs c order for ostomy care and teaching. Charge [employer] aware. Mamie with [facility] aware of potential discharge today and said pt is on [facility] schedule for tomorrow at home.   Electronically signed by Daniel Aguiar on 6/20/2022 at 11:32 AM

## 2022-06-24 NOTE — OP NOTE
Operative Note      Patient: Juan Manuel Diaz  YOB: 1975  MRN: [id number]    Date of Procedure: 6/13/2022    Pre-Op Diagnosis: Acute complex diverticulitis with abscess not amenable to IR drainage, smoldering diverticulitis, abnormal CT scan    Post-Op Diagnosis: Same       Procedure(s):  1. EXPLORATORY LAPAROTOMY  2. MOBILIZATION OF SPLENIC FLEXURE  3. OPEN LEFT HEMICOLECTOMY  4. TRANSVERSE END COLOSTOMY  5. APPENDECTOMY  6. PARTIAL OMENTECTOMY  7.  PRIMARY REPAIR OF UMBILICAL HERNIA    Surgeon(s):  Amy Cuevas MD    Assistant:   Resident: Ember Chery MD    Anesthesia: General    Estimated Blood Loss (mL): 518     Complications: None    Specimens:   ID Type Source Tests Collected by Time Destination   1 : PERITONEAL FLUID Body Fluid Fluid CULTURE, FUNGUS, GRAM STAIN, CULTURE, BODY FLUID, CULTURE WITH SMEAR, ACID FAST Dev Keating MD 6/13/2022 1419    2 : PELVIC ABSCESS Body Fluid Fluid CULTURE, FUNGUS, GRAM STAIN, CULTURE, BODY FLUID, CULTURE WITH SMEAR, ACID FAST Dev Keating MD 6/13/2022 1421    A : LEFT COLON Tissue Colon SURGICAL PATHOLOGY Amy Cuevas MD 6/13/2022 1455    B : [address], MD 6/13/2022 1456    C : APPENDIX Tissue Appendix SURGICAL PATHOLOGY Amy Cuevas MD 6/13/2022 1510        Implants:  * No implants in log *      Drains:   Colostomy LUQ (Active)   Stomal Appliance 2 piece 06/20/22 1517   Stoma  Assessment Red;Protrudes 06/20/22 1517   Peristomal Assessment Intact 06/20/22 1517   Treatment Bag change 06/20/22 1517   Stool Appearance Soft 06/20/22 1517   Stool Color Brown 06/20/22 1517   Stool Amount Small 06/20/22 1517   Output (mL) 0 ml 06/20/22 0601       [REMOVED] Closed/Suction Drain RUQ Bulb (Removed)   Dressing Status New dressing applied 06/14/22 2158   Drainage Appearance Serosanguinous 06/14/22 2158   Drain Status To bulb suction 06/14/22 2158   Output (ml) 10 ml 06/14/22 1833       [REMOVED] NG/OG/NJ/NE Tube Nasogastric 16 fr Left nostril (Removed)   Surrounding Skin Clean, dry & intact 06/10/22 0815   Securement device Tape 06/10/22 0815   Status Clamped 06/10/22 0815   Placement Verified X-Ray (Initial) 06/08/22 0951   Drainage Appearance Brown 06/09/22 1610   Free Water/Flush (mL) 100 mL 06/09/22 1610   Output (mL) 150 ml 06/09/22 0451   Residual Volume (ml) 0 ml 06/08/22 2137       [REMOVED] NG/OG/NJ/NE Tube Nasoenteric decompression tube Left nostril (Removed)   Surrounding Skin Clean, dry & intact 06/14/22 1143   Securement device Tape 06/14/22 1143   Status Suction-low intermittent 06/14/22 2207   Drainage Appearance Brown 06/14/22 2207   Output (mL) 50 ml 06/15/22 0602       [REMOVED] Urinary Catheter 2 Way (Removed)   Catheter Indications Perioperative use for selected surgical procedures 06/15/22 0830   Site Assessment Pink 06/15/22 0830   Urine Color Yellow 06/15/22 0830   Urine Appearance Clear 06/15/22 0830   Securement Method Leg strap 06/15/22 0830   Catheter Best Practices  Drainage tube clipped to bed;Catheter secured to thigh; Bag below bladder;Bag not on floor; Lack of dependent loop in tubing;Drainage bag less than half full 06/15/22 0830   Status Draining 06/14/22 0039   Output (mL) 700 mL 06/15/22 0830       Findings: significant inflammation of the small bowel and peritoneum with abscess cavity in the RLQ into the pelvis involving the appendix and sigmoid colon. No evidence of feculent peritonitis. Bowel with fibrinous exudate. Severely inflamed sigmoid colon extending up to the splenic flexure. Ischemic omentum removed. Appendix densely adhered to sigmoid and removed as well. Cultures taken of abscess and peritoneal fluid. Sigmoid colon with dense, firm area of inflammation and adhered to anterior pelvic structures. Left ureter protected. Splenic flexure mobilized secondary to it's involvement.   Distal staple line at normal appearing rectum and proximal staple line at normal appearing distal transverse colon. Spleen protected during flexure mobilization. Copious irrigation. Rectal stump tagged with 2-0 prolene and staple line oversewn. 19F drain left in pelvis. Fascia closed and skin left open with wet to dry dressings secondary to dirty case. Transverse end colostomy fashioned w/o complication. Rectum irrigated out at conclusion of case. Discussed with family.        Detailed Description of Procedure: The patient was brought to the operating room and positioned supine on the operating room table. Sequential compression devices were placed on the patient's lower extremities and were powered on. General anesthesia was administered and preoperative antibiotics were administered per the anesthetic record. The patient was prepped and draped in the usual sterile fashion and the procedure went forth with strict aseptic technique under maximal barrier precautions. Immediately prior to the procedure a time-out was called and the surgical checklist was reviewed and agreed upon by all present. A midline incision was made with a 10 scalpel blade and was carried down with electrocautery through the subcutaneous tissues to the midline fascia. The fascia was grasped on either side with clamps and lifted superiorly and incised sharply with scissors. The peritoneum was then grasped with straight hemostats lifted superiorly and incised sharply with scissors. A finger was inserted within the intra-abdominal cavity and adhesions were cleared from the anterior abdominal wall. Incision was extended cephalad and caudad with electrocautery using a finger as a guide to protect the abdominal contents from thermal injury. During this dissection we were able to appreciate an umbilical hernia of approximately 2 cm sized which contained healthy appearing, nonischemic omental fat. We met with an extensive mount of thin ascitic fluid. This was suctioned.   There was significant inflammation of the small bowel and peritoneum. We worked to mobilize the structures that were within the intra-abdominal cavity with blunt dissection. We were able to free the small bowel from the anterior abdominal wall and the surrounding large intestine. Started first in the left lower quadrant and proceeded in a counterclockwise manner to work to free the adhesions. During the course for dissection we noted that there was significant omental attachments to both the anterior abdominal wall and the right lower quadrant. This was freed and there was ischemic omentum noted after it was mobilized freely from the right lower quadrant. The areas of ischemic omentum were taken with the LigaSure device. Once we were able to free the small bowel completely from the adhesed structures we eviscerated the small bowel and ran it from the ligament of Treitz to the ligament of Treves. There were some adhesions between small bowel segments which were lysed with Metzenbaum scissors. We ran the small bowel in a stepwise fashion after he was completely lysed and noted to be free of serosal tears or other abnormalities. Given the extensive inflammation within the right lower quadrant with remaining adhesions we chose to first inspected the left lower quadrant and the rectum. The sigmoid colon was noted to be dense, with a firm area of inflammation adhered to the anterior pelvis. This was mobilized with gentle blunt dissection. We were unable to completely free it from the lower pelvis and given our fear of injury to the left ureter and the colon itself due to the extensive inflammation we chose to create a mesenteric window at the area of sigmoid colon which was accessible to us at this point. Mesenteric windows were created with a hemostat and a curvilinear LYUDMILA blue load stapler was used to come across the sigmoid colon at this area.   Prior imaging revealed a stricture near the splenic flexure so we planned at this point to proceed with a left hemicolectomy. We used the resected segment of sigmoid colon as a handle and lifted it superiorly and medially in order to visualize the lateral attachments of the descending colon to the abdominal sidewall. Doing this we were able to visualize white line of Toldt and were able to divide this with electrocautery up to nearly the splenic flexure. However given the extensive inflammation in the area and obliteration of normal tissue planes we were concerned about our ability to safely proceed. We therefore lifted the transverse mesocolon and used electrocautery to enter the lesser sac near the area of the transverse colon. Which shows an area of transverse colon which appeared to be of normal caliber and without signs of significant inflammation and thickening. At this area we created a mesenteric window with a hemostat and used a LYUDMILA 75 blue load stapler to come across the transverse colon at that area. Then using the distal transected segment of transverse colon as a handle we used the LigaSure device to come through the transverse mesocolon and the associated mesentery to meet the dissected descending colon segment. In this manner we were able to safely complete our dissection of the segment and once completely freed we handed the specimen off for pathologic examination. We inspected the rectal stump and noted it to be hemostatic and intact. The rectum there appeared without signs of induration or defects. We used a 2-0 Prolene to tack in the rectum on either side of the staple line and we oversewed the staple line in a Lembert style using Vicryl suture. Having the sigmoid colon now out of the way we were able to better visualize the right lower quadrant. There is significant fibrinous exudate in the inflammation in the area of the cecum.   Using a blunt dissection we really completely freed the cecum and the proximal ascending colon from the anterior abdominal wall. Once this was performed we noticed significant amount of purulent fluid as well as some ascites within the area. This fluid was collected for microscopic examination with Gram stain and aerobic/anaerobic culture. Once his fluid was suctioned we were able to see the appendix which appeared significantly inflamed without signs of perforation. The cecum and ascending colon were spared from this inflammation. Because of its significant inflammation we proceeded with appendectomy. We created a mesenteric window at the base of the appendix near its junction with the cecum within the mesoappendix using a hemostat and a LYUDMILA 75 blue load stapler was used to come across the appendix. We used a LigaSure device to come through the remaining mesoappendix. We handed the specimen off for pathologic examination. We inspected the remainder of the intra-abdominal contents and noted them to be within normal limits. With direct palpation of the stomach and with coordination with our anesthesia colleagues we were able to place the NG tube in appropriate position. We irrigated the abdomen thoroughly with warm saline and suctioned it completely. Given the extensive amount of purulent fluid within the pelvis we decided to proceed with a drain placement. We created a defect within the lower abdomen using electrocautery and placed a 23 Persian LUC drain terminating around the area of the rectal stump and traversing the right pelvis. This was secured to the skin with nylon suture. We then proceeded with creation of our end transverse colostomy. Using an Allis clamp we grasped the skin at the left hemiabdomen and lifted this superiorly. We used electrocautery to come through the skin in a circular fashion and then subcutaneous tissues to the level of the anterior rectus fascia. The tissue was then amputated and passed off the field.   Using retractors we further exposed the anterior rectus fascia and made an incision using electrocautery through it. We saw the rectus muscle fibers and spread these medially and laterally exposing the posterior rectus sheath which was in divided in a cruciate fashion with electrocautery. We were able to place 2 fingers through the defect easily. We then able to grasp the staple line of the transverse colon and were able to deliver it through the wound. We then proceeded with closure of the midline fascia after turning the intra-abdominal structures to their appropriate anatomic positions. Using a looped PDS suture we sewed the fascia closed from the superior aspect to the middle and the inferior aspect to the middle and then tying the suture there. In the course of closing the fascia we were able to primarily repair the umbilical hernia which was present and noted at the time of our entry into the abdominal cavity. We irrigated the midline wound and protected with blue towels for the next portion of the procedure. Return to maturation of her end colostomy. We grasped the staple line with Allis clamps and amputated it with Metzenbaum scissors. Bleeding was controlled with electrocautery. We then used Vicryl suture to [place] the ostomy to the dermis of the defect that was created earlier. The ostomy was digitized and the fascia was traversed easily. Brown stool was encountered. An ostomy appliance was applied to the newly fashioned end colostomy. Finally wet-to-dry dressings with saline soaked Kerlix gauze were applied to the midline wound given purulent material encountered during the case. Heavy drainage packs were applied and taped in place. A drain sponge was applied around the drain site. Counts were correct x2. Patient was awoken from general anesthesia and brought to the PACU in stable condition.   We will await return of bowel function we will plan for delayed primary closure in a few days if his midline wound appears healthy and without signs of infection. Dr. Margarita Booth was present for this procedure.       Electronically signed by Pamela Kay MD on 6/23/2022 at 10:25 PM

## 2022-07-18 LAB
FUNGUS (MYCOLOGY) CULTURE: NORMAL
FUNGUS (MYCOLOGY) CULTURE: NORMAL
FUNGUS STAIN: NORMAL
FUNGUS STAIN: NORMAL

## 2022-07-22 NOTE — DISCHARGE SUMMARY
Physician Discharge Summary     Patient ID:  Junior Wadsworth  [id number]  14 y.o.  1975    Admit date: 6/5/2022    Discharge date and time: 6/20/2022  5:05 PM     Admitting Physician: Fabi Keating MD     Admission Diagnoses: Diverticulitis [K57.92]    Discharge Diagnoses: Principal Problem:    Diverticulitis  Resolved Problems:    * No resolved hospital problems. *      Admission Condition: fair    Discharged Condition: stable      Hospital Course:  Junior Wadsworth is a 50 y.o. male who presented with abdominal pain and constipation. Work up revealed acute diverticulitis. He was treated with IV antibiotics. He developed ileus requiring NG tube as he was not tolerating an oral diet. He underwent colonoscopy to rule out obstruction on 6/8 that revealed an area of sigmoid narrowing but no mass. He remained ill and was not progressing so decision was made to perform exploratory laparotomy with left hemicolectomy and end colostomy on 6/13. He then progressed well, pain was controlled on PO medications. He was tolerating a regular diet with no nausea or vomiting, and was in a suitable condition for discharge to home in stable condition. Consults:   IP CONSULT TO GENERAL SURGERY  IP CONSULT TO INFECTIOUS DISEASES  IP CONSULT TO DIETITIAN  IP CONSULT TO IV TEAM  IP CONSULT TO HOME CARE NEEDS    Significant Diagnostic Studies:   XR CHEST (2 VW)    Result Date: 6/5/2022  EXAMINATION: TWO XRAY VIEWS OF THE CHEST 6/5/2022 1:20 pm COMPARISON: None. HISTORY: ORDERING SYSTEM PROVIDED HISTORY: abd pain TECHNOLOGIST PROVIDED HISTORY: Reason for exam:->abd pain FINDINGS: The lungs are without acute focal process. There is no effusion or pneumothorax. The cardiomediastinal silhouette is without acute process. The osseous structures are without acute process. No acute process.      CT ABDOMEN PELVIS W IV CONTRAST Additional Contrast? None    Result Date: 6/5/2022  EXAMINATION: CT OF THE ABDOMEN AND PELVIS WITH CONTRAST 6/5/2022 2:17 pm TECHNIQUE: CT of the abdomen and pelvis was performed with the administration of intravenous contrast. Multiplanar reformatted images are provided for review. Automated exposure control, iterative reconstruction, and/or weight based adjustment of the mA/kV was utilized to reduce the radiation dose to as low as reasonably achievable. COMPARISON: None. HISTORY: ORDERING SYSTEM PROVIDED HISTORY: abd pain and constipation TECHNOLOGIST PROVIDED HISTORY: Additional Contrast?->None Reason for exam:->abd pain and constipation Decision Support Exception - unselect if not a suspected or confirmed emergency medical condition->Emergency Medical Condition (MA) FINDINGS: Lower Chest: The lung bases are grossly clear. Organs: There is diffuse fatty infiltration of the liver. The spleen is upper limits of normal in size. Some fluid seen surrounding the liver and spleen. No underlying mass or lesion. Pancreas is homogeneous. No stones seen in the gallbladder. No intrahepatic or extrahepatic biliary ductal dilatation. The adrenal glands are both unremarkable. Small cyst seen in the right kidney. Symmetric enhancement of the renal parenchyma. No stones or distension seen in the kidneys. GI/Bowel: The stomach is unremarkable. The small bowel is within normal limits. Abnormal wall thickening identified of the sigmoid colon to suggest a colitis. There are multiple diverticulum identified. Findings may suggest an acute diverticulitis. There is abnormal pericolonic stranding and fluid within the pelvis and pericolic gutters. Pelvis: The bladder reveals mild wall thickening. Correlation to urinalysis is recommended to exclude possible cystitis. The prostate is unremarkable. Peritoneum/Retroperitoneum: No abdominal retroperitoneal lymphadenopathy. There is borderline enlarged lymph nodes seen throughout the left periaortic region to suggest reactive lymph nodes. There is no pelvic adenopathy.   Free fluid identified in the abdomen minimal in appearance and pericolic gutters. No extraluminal air to suggest perforation. No loculation to suggest abscess. Bones/Soft Tissues: The bony structures reveal degenerative changes seen within the spine and pelvis. No ventral abdominal wall mass. Small umbilical hernia containing fat only. There is abnormal wall thickening identified of the sigmoid colon with surrounding stranding and fluid to suggest acute diverticulitis or focal colitis. No evidence of perforation or abscess at this time. Fluid is seen surrounding both the liver and spleen as well as throughout the small bowel mesentery. [facility] Discharge Exam:  General Appearance:  awake, alert, oriented, in no acute distress  Skin:  Skin color, texture, turgor normal  Head/face:  NCAT  Eyes:  No gross abnormalities. Sclera nonicteric  Lungs/Chest:  Normal expansion. No respiratory distress. Heart: Warm throughout. Regular rate  Abdomen:  Soft, appropriately tender, midline well approximated with staples, no evidence of infection, ostomy pink with some liquid in pouch, actively passing gas while examining. Extremities: Extremities warm to touch, pink, with no edema. Disposition: home    In process/preliminary results:  Outstanding Order Results       Date and Time Order Name Status Description    6/13/2022  2:21 PM Culture with Smear, Acid Fast Bacillius Preliminary     6/13/2022  2:19 PM Culture with Smear, Acid Fast Bacillius Preliminary             Patient Instructions:   Discharge Medication List as of 6/20/2022  4:20 PM        START taking these medications    Details   oxyCODONE (ROXICODONE) 5 MG immediate release tablet Take 1 tablet by mouth every 6 hours as needed for Pain for up to 5 days. , Disp-20 tablet, R-0Normal      methocarbamol (ROBAXIN) 500 MG tablet Take 1 tablet by mouth 3 times daily for 10 days, Disp-30 tablet, R-0Normal      pantoprazole (PROTONIX) 40 MG tablet Take 1 tablet by mouth every morning (before breakfast), Disp-30 tablet, R-3Normal      senna (SENOKOT) 8.6 MG tablet Take 1 tablet by mouth 2 times daily for 10 days, Disp-20 tablet, R-0Normal      fluconazole (DIFLUCAN) 200 MG tablet Take 2 tablets by mouth daily for 7 days, Disp-14 tablet, R-0Normal      linezolid (ZYVOX) 600 MG tablet Take 1 tablet by mouth 2 times daily for 7 days, Disp-14 tablet, R-0Normal           CONTINUE these medications which have NOT CHANGED    Details   escitalopram (LEXAPRO) 20 MG tablet Take 20 mg by mouth daily           STOP taking these medications       gabapentin (NEURONTIN) 100 MG capsule Comments:   Reason for Stopping:               Home Care   Keep the incision area clean and dry   Okay to take a shower starting tomorrow. Place ice on incisions to decrease the pain and bruising. Physical Activity   Walk frequently to prevent blood clots in the legs, but do not do anything that causes pain in the incisions. Breath deeply every hour to prevent pneumonia. No heavy lifting over 10lbs for 4-6wks. Work   Okay to return to work with light duty next week when your pain is controlled   Avoid Heavy lifting while at work   Await follow-up appointment in 2 weeks for full work clearance   Medications   Restart blood thinners in 24 hours if no sign of bleeding   Take Ibuprofen for moderate pain. Use the Oxycodone for more severe pain. No driving while taking prescription pain medication   Follow-up   Schedule a follow-up appointment with Dr. Eric Miranda in 2 weeks. Call Your Doctor If Any of the Following Occurs   Signs of infection, including fever and chills   Redness, swelling, increasing pain, excessive bleeding, or discharge at the incision site   Cough, shortness of breath, chest pain   Increased abdominal pain   Nausea and/or vomiting that does not resolve off narcotics.    Pain, burning, urgency or frequency of urination, or blood in the urine   Pain and/or swelling in your feet, calves, or legs Dark urine, light stools, or evidence of jaundice (yellowing of the skin or eyes). In case of an emergency, CALL 911 immediately. Radha Love MD  Minimally Invasive General Surgery and Endoscopy  [address] [facility].  [address] [place] [address] Drive: [phone]  F: [phone]     Follow up:   [address]   [address]  [phone]    ostomy care    Hector Quinones MD  [address] [place] [address]  [address]  hospitals [phone]    In 2 weeks  post op; staple removal       Signed:  Veena Diaz MD  7/22/2022  8:53 AM

## 2022-08-02 LAB
AFB CULTURE (MYCOBACTERIA): NORMAL
AFB CULTURE (MYCOBACTERIA): NORMAL
AFB SMEAR: NORMAL
AFB SMEAR: NORMAL

## 2022-12-14 ENCOUNTER — HOSPITAL ENCOUNTER (OUTPATIENT)
Age: 47
Discharge: HOME | End: 2022-12-16

## 2022-12-14 PROCEDURE — 87081 CULTURE SCREEN ONLY: CPT

## 2022-12-16 LAB — MRSA CULTURE ONLY: NORMAL

## 2022-12-20 ENCOUNTER — HOSPITAL ENCOUNTER (OUTPATIENT)
Age: 47
Discharge: HOME | End: 2022-12-22

## 2022-12-20 LAB
ABO/RH: NORMAL
ANTIBODY SCREEN: NORMAL

## 2022-12-20 PROCEDURE — 86900 BLOOD TYPING SEROLOGIC ABO: CPT

## 2022-12-20 PROCEDURE — 86850 RBC ANTIBODY SCREEN: CPT

## 2022-12-20 PROCEDURE — 86901 BLOOD TYPING SEROLOGIC RH(D): CPT

## 2022-12-21 ENCOUNTER — HOSPITAL ENCOUNTER (OUTPATIENT)
Age: 47
Discharge: HOME | End: 2022-12-23

## 2022-12-21 LAB
ANION GAP SERPL CALCULATED.3IONS-SCNC: 10 MMOL/L (ref 7–16)
BUN BLDV-MCNC: 20 MG/DL (ref 6–20)
CALCIUM SERPL-MCNC: 8.8 MG/DL (ref 8.6–10.2)
CHLORIDE BLD-SCNC: 102 MMOL/L (ref 98–107)
CO2: 25 MMOL/L (ref 22–29)
CREAT SERPL-MCNC: 1.1 MG/DL (ref 0.7–1.2)
GFR SERPL CREATININE-BSD FRML MDRD: >60 ML/MIN/1.73
GLUCOSE BLD-MCNC: 157 MG/DL (ref 74–99)
HCT VFR BLD CALC: 34.1 % (ref 37–54)
HEMOGLOBIN: 11.1 G/DL (ref 12.5–16.5)
MCH RBC QN AUTO: 27.2 PG (ref 26–35)
MCHC RBC AUTO-ENTMCNC: 32.6 % (ref 32–34.5)
MCV RBC AUTO: 83.6 FL (ref 80–99.9)
PDW BLD-RTO: 13.9 FL (ref 11.5–15)
PLATELET # BLD: 335 E9/L (ref 130–450)
PMV BLD AUTO: 11.4 FL (ref 7–12)
POTASSIUM SERPL-SCNC: 4.5 MMOL/L (ref 3.5–5)
RBC # BLD: 4.08 E12/L (ref 3.8–5.8)
SODIUM BLD-SCNC: 137 MMOL/L (ref 132–146)
WBC # BLD: 19.4 E9/L (ref 4.5–11.5)

## 2022-12-21 PROCEDURE — 80048 BASIC METABOLIC PNL TOTAL CA: CPT

## 2022-12-21 PROCEDURE — 85027 COMPLETE CBC AUTOMATED: CPT

## 2022-12-22 ENCOUNTER — HOSPITAL ENCOUNTER (OUTPATIENT)
Age: 47
Discharge: HOME | End: 2022-12-24

## 2022-12-22 LAB
ANION GAP SERPL CALCULATED.3IONS-SCNC: 9 MMOL/L (ref 7–16)
BUN BLDV-MCNC: 15 MG/DL (ref 6–20)
CALCIUM SERPL-MCNC: 9.4 MG/DL (ref 8.6–10.2)
CHLORIDE BLD-SCNC: 101 MMOL/L (ref 98–107)
CO2: 29 MMOL/L (ref 22–29)
CREAT SERPL-MCNC: 1 MG/DL (ref 0.7–1.2)
GFR SERPL CREATININE-BSD FRML MDRD: >60 ML/MIN/1.73
GLUCOSE BLD-MCNC: 108 MG/DL (ref 74–99)
HCT VFR BLD CALC: 33.7 % (ref 37–54)
HEMOGLOBIN: 11 G/DL (ref 12.5–16.5)
MCH RBC QN AUTO: 27.7 PG (ref 26–35)
MCHC RBC AUTO-ENTMCNC: 32.6 % (ref 32–34.5)
MCV RBC AUTO: 84.9 FL (ref 80–99.9)
PDW BLD-RTO: 14.1 FL (ref 11.5–15)
PLATELET # BLD: 341 E9/L (ref 130–450)
PMV BLD AUTO: 11.5 FL (ref 7–12)
POTASSIUM SERPL-SCNC: 4.1 MMOL/L (ref 3.5–5)
RBC # BLD: 3.97 E12/L (ref 3.8–5.8)
SODIUM BLD-SCNC: 139 MMOL/L (ref 132–146)
WBC # BLD: 16.4 E9/L (ref 4.5–11.5)

## 2022-12-22 PROCEDURE — 80048 BASIC METABOLIC PNL TOTAL CA: CPT

## 2022-12-22 PROCEDURE — 85027 COMPLETE CBC AUTOMATED: CPT

## 2022-12-23 ENCOUNTER — HOSPITAL ENCOUNTER (OUTPATIENT)
Age: 47
Discharge: HOME | End: 2022-12-25

## 2022-12-23 LAB
ANION GAP SERPL CALCULATED.3IONS-SCNC: 8 MMOL/L (ref 7–16)
BUN BLDV-MCNC: 20 MG/DL (ref 6–20)
CALCIUM SERPL-MCNC: 9.1 MG/DL (ref 8.6–10.2)
CHLORIDE BLD-SCNC: 102 MMOL/L (ref 98–107)
CO2: 31 MMOL/L (ref 22–29)
CREAT SERPL-MCNC: 1 MG/DL (ref 0.7–1.2)
GFR SERPL CREATININE-BSD FRML MDRD: >60 ML/MIN/1.73
GLUCOSE BLD-MCNC: 103 MG/DL (ref 74–99)
HCT VFR BLD CALC: 32.6 % (ref 37–54)
HEMOGLOBIN: 10.7 G/DL (ref 12.5–16.5)
MCH RBC QN AUTO: 28.2 PG (ref 26–35)
MCHC RBC AUTO-ENTMCNC: 32.8 % (ref 32–34.5)
MCV RBC AUTO: 86 FL (ref 80–99.9)
PDW BLD-RTO: 14.1 FL (ref 11.5–15)
PLATELET # BLD: 353 E9/L (ref 130–450)
PMV BLD AUTO: 11.2 FL (ref 7–12)
POTASSIUM SERPL-SCNC: 3.4 MMOL/L (ref 3.5–5)
RBC # BLD: 3.79 E12/L (ref 3.8–5.8)
SODIUM BLD-SCNC: 141 MMOL/L (ref 132–146)
WBC # BLD: 11.8 E9/L (ref 4.5–11.5)

## 2022-12-23 PROCEDURE — 80048 BASIC METABOLIC PNL TOTAL CA: CPT

## 2022-12-23 PROCEDURE — 85027 COMPLETE CBC AUTOMATED: CPT

## 2023-09-26 ENCOUNTER — APPOINTMENT (OUTPATIENT)
Dept: CT IMAGING | Age: 48
End: 2023-09-26
Payer: COMMERCIAL

## 2023-09-26 ENCOUNTER — HOSPITAL ENCOUNTER (EMERGENCY)
Age: 48
Discharge: HOME | End: 2023-09-26
Payer: COMMERCIAL

## 2023-09-26 VITALS
OXYGEN SATURATION: 100 % | WEIGHT: 245 LBS | RESPIRATION RATE: 15 BRPM | HEIGHT: 67 IN | TEMPERATURE: 98.2 F | SYSTOLIC BLOOD PRESSURE: 162 MMHG | DIASTOLIC BLOOD PRESSURE: 119 MMHG | HEART RATE: 62 BPM | BODY MASS INDEX: 38.45 KG/M2

## 2023-09-26 DIAGNOSIS — I10 ESSENTIAL HYPERTENSION: ICD-10-CM

## 2023-09-26 DIAGNOSIS — M54.41 ACUTE RIGHT-SIDED BACK PAIN WITH SCIATICA: Primary | ICD-10-CM

## 2023-09-26 DIAGNOSIS — M51.36 BULGING LUMBAR DISC: ICD-10-CM

## 2023-09-26 PROCEDURE — 6360000002 HC RX W HCPCS: Performed by: PHYSICIAN ASSISTANT

## 2023-09-26 PROCEDURE — 72131 CT LUMBAR SPINE W/O DYE: CPT

## 2023-09-26 PROCEDURE — 96372 THER/PROPH/DIAG INJ SC/IM: CPT

## 2023-09-26 PROCEDURE — 99284 EMERGENCY DEPT VISIT MOD MDM: CPT

## 2023-09-26 PROCEDURE — 6370000000 HC RX 637 (ALT 250 FOR IP): Performed by: PHYSICIAN ASSISTANT

## 2023-09-26 RX ORDER — IBUPROFEN 800 MG/1
800 TABLET ORAL 2 TIMES DAILY PRN
Qty: 20 TABLET | Refills: 0 | Status: SHIPPED | OUTPATIENT
Start: 2023-09-26 | End: 2023-10-06

## 2023-09-26 RX ORDER — METHOCARBAMOL 500 MG/1
500 TABLET, FILM COATED ORAL NIGHTLY PRN
Qty: 10 TABLET | Refills: 0 | Status: SHIPPED | OUTPATIENT
Start: 2023-09-26 | End: 2023-10-06

## 2023-09-26 RX ORDER — PREDNISONE 20 MG/1
60 TABLET ORAL ONCE
Status: COMPLETED | OUTPATIENT
Start: 2023-09-26 | End: 2023-09-26

## 2023-09-26 RX ORDER — KETOROLAC TROMETHAMINE 30 MG/ML
30 INJECTION, SOLUTION INTRAMUSCULAR; INTRAVENOUS ONCE
Status: COMPLETED | OUTPATIENT
Start: 2023-09-26 | End: 2023-09-26

## 2023-09-26 RX ORDER — LIDOCAINE 50 MG/G
1 PATCH TOPICAL EVERY 24 HOURS
Qty: 30 PATCH | Refills: 0 | Status: SHIPPED | OUTPATIENT
Start: 2023-09-26 | End: 2023-10-26

## 2023-09-26 RX ADMIN — PREDNISONE 60 MG: 20 TABLET ORAL at 10:32

## 2023-09-26 RX ADMIN — KETOROLAC TROMETHAMINE 30 MG: 30 INJECTION, SOLUTION INTRAMUSCULAR; INTRAVENOUS at 10:33

## 2023-09-26 ASSESSMENT — PAIN DESCRIPTION - FREQUENCY
FREQUENCY: CONTINUOUS
FREQUENCY: CONTINUOUS

## 2023-09-26 ASSESSMENT — PAIN DESCRIPTION - DESCRIPTORS
DESCRIPTORS: ACHING;SORE
DESCRIPTORS: DISCOMFORT

## 2023-09-26 ASSESSMENT — PAIN DESCRIPTION - ONSET: ONSET: ON-GOING

## 2023-09-26 ASSESSMENT — PAIN DESCRIPTION - LOCATION
LOCATION: BACK
LOCATION: HIP;LEG

## 2023-09-26 ASSESSMENT — PAIN DESCRIPTION - ORIENTATION
ORIENTATION: LEFT;RIGHT;LOWER
ORIENTATION: RIGHT

## 2023-09-26 ASSESSMENT — LIFESTYLE VARIABLES
HOW MANY STANDARD DRINKS CONTAINING ALCOHOL DO YOU HAVE ON A TYPICAL DAY: PATIENT DOES NOT DRINK
HOW OFTEN DO YOU HAVE A DRINK CONTAINING ALCOHOL: NEVER

## 2023-09-26 ASSESSMENT — PAIN SCALES - GENERAL
PAINLEVEL_OUTOF10: 9
PAINLEVEL_OUTOF10: 7
PAINLEVEL_OUTOF10: 6

## 2023-09-26 ASSESSMENT — PAIN DESCRIPTION - PAIN TYPE
TYPE: ACUTE PAIN
TYPE: ACUTE PAIN

## 2023-09-26 ASSESSMENT — PAIN - FUNCTIONAL ASSESSMENT: PAIN_FUNCTIONAL_ASSESSMENT: 0-10

## 2023-09-26 NOTE — ED NOTES
Reviewed discharge instructions with patient, discussed medications and addressed all patient questions/concerns. Pt verbalizes understanding.        David García RN  09/26/23 [id number]

## 2023-09-26 NOTE — DISCHARGE INSTRUCTIONS
IBUPROFEN twice daily with food. Tylenol for mild-to-moderate pain and Robaxin for spasm, however use caution as may cause drowsiness or sedation. Do not drive or drink alcohol while taking. Any increased pain, worsening symptoms or concerns including numbness, tingling, sensation loss to the legs, genitals, rectum, or loss of bowel or bladder control should return to the ER for evaluation. Otherwise follow-up with your PCP in 5-7 days for recheck.

## 2023-09-26 NOTE — ED PROVIDER NOTES
Independent [facility] Visit. [facility]   ED  Encounter Note  Admit Date/RoomTime: 2023  9:33 AM  ED Room:     NAME: Tasneem Kaur  : 1975  MRN: [id number]     Chief Complaint:  Back Pain (Right side lower back pain, radiates down leg since Saturday )    History of Present Illness       Tasneem Kaur is a 46 y.o. old male with a prior history of no prior back problems, presents to the emergency department by private vehicle, for non-traumatic acute, sharp right lower lumbar spine pain with radiation down his right thigh, for 4 day(s) prior to arrival.  There has been no recent injury as it relates to today's visit. Since onset the symptoms have been stable and is mild-to-moderate in severity. He has associated signs & symptoms of nothing additional.   He denies any bladder or bowel incontinence , new weakness, tingling or paresthesias, recent back injection, recent spinal surgery, recent spinal/chiropractic manipulation, history of IVDA, fever, abdominal pain, saddle paresthesias , or sacral numbness. The pain is aggraveated by any movement and relieved by nothing in particular. He is not currently enrolled in an pain management program or managed by PCP or back specialist.      .  ROS   Pertinent positives and negatives are stated within HPI, all other systems reviewed and are negative. Past Medical History:  has a past medical history of Depression and Hypertension. Surgical History:  has a past surgical history that includes Tonsillectomy; Colonoscopy (N/A, 2022); and laparotomy (N/A, 2022). Social History:  reports that he has never smoked. He has never used smokeless tobacco. He reports current alcohol use of about 3.0 standard drinks of alcohol per week. He reports that he does not use drugs. Family History: family history is not on file. Allergies: Patient has no known allergies.     Physical Exam   Oxygen Saturation

## (undated) DEVICE — BANDAGE,GAUZE,BULKEE II,4.5"X4.1YD,STRL: Brand: MEDLINE

## (undated) DEVICE — SET INSTRUMENT LAP II

## (undated) DEVICE — DOUBLE BASIN SET: Brand: MEDLINE INDUSTRIES, INC.

## (undated) DEVICE — SUTURE PROL SZ 2-0 L30IN NONABSORBABLE BLU L26MM CT-2 1/2 8411H

## (undated) DEVICE — DRAIN SURG 19FR 100% SIL RADPQ RND CHN FULL FLUT

## (undated) DEVICE — FORM MED AMBULATORY ONCOLOGY PHYSICIAN RPT

## (undated) DEVICE — ELECTRODE PT RET AD L9FT HI MOIST COND ADH HYDRGEL CORDED

## (undated) DEVICE — TRAP SPEC MUCUS FOR SUCT

## (undated) DEVICE — BLADE CLIPPER GEN PURP NS

## (undated) DEVICE — GOWN,SIRUS,FABRNF,XL,20/CS: Brand: MEDLINE

## (undated) DEVICE — Device

## (undated) DEVICE — TOWEL,OR,DSP,ST,BLUE,STD,6/PK,12PK/CS: Brand: MEDLINE

## (undated) DEVICE — PACK PROCEDURE SURG GEN CUST

## (undated) DEVICE — SPONGE LAP W18XL18IN WHT COT 4 PLY FLD STRUNG RADPQ DISP ST

## (undated) DEVICE — APPLIER CLP 12FR L86CM W/ ENDOANCHOR CASS FOR EVAR HELI-FX

## (undated) DEVICE — DRAPE,LAP,CHOLE,W/TROUGHS,STERILE: Brand: MEDLINE

## (undated) DEVICE — INTENDED FOR TISSUE SEPARATION, AND OTHER PROCEDURES THAT REQUIRE A SHARP SURGICAL BLADE TO PUNCTURE OR CUT.: Brand: BARD-PARKER ® STAINLESS STEEL BLADES

## (undated) DEVICE — RELOAD STPL L75MM OPN H3.8MM CLS 1.5MM WIRE DIA0.2MM REG

## (undated) DEVICE — SUTURE PERMA-HAND SZ 3-0 L24IN NONABSORBABLE BLK W/O NDL SA74H

## (undated) DEVICE — GLOVE ORTHO 8   MSG9480

## (undated) DEVICE — SOLUTION IV IRRIG POUR BRL 0.9% SODIUM CHL 2F7124

## (undated) DEVICE — GLOVE ORANGE PI 7 1/2   MSG9075

## (undated) DEVICE — YANKAUER,POOLE TIP,STERILE,50/CS: Brand: MEDLINE

## (undated) DEVICE — STAPLER INT L75MM CUT LN L73MM STPL LN L77MM BLU B FRM 8

## (undated) DEVICE — Device: Brand: SENSURA MIO

## (undated) DEVICE — APPLICATOR PREP 26ML 0.7% IOD POVACRYLEX 74% ISO ALC ST

## (undated) DEVICE — COVER HNDL LT DISP

## (undated) DEVICE — TOWEL,OR,DSP,ST,GREEN,DLX,XR,4/PK,20PK/C: Brand: MEDLINE

## (undated) DEVICE — 4-PORT MANIFOLD: Brand: NEPTUNE 2

## (undated) DEVICE — BLADE ES L6IN ELASTOMERIC COAT EXT DURABLE BEND UPTO 90DEG

## (undated) DEVICE — TOTAL TRAY, 16FR 10ML SIL FOLEY, URN: Brand: MEDLINE

## (undated) DEVICE — RETRACTOR BOOKWALTER BLADE

## (undated) DEVICE — SYRINGE IRRIG 60ML SFT PLIABLE BLB EZ TO GRP 1 HND USE W/

## (undated) DEVICE — SET INSTRUMENT LAP I